# Patient Record
Sex: FEMALE | Employment: FULL TIME | URBAN - METROPOLITAN AREA
[De-identification: names, ages, dates, MRNs, and addresses within clinical notes are randomized per-mention and may not be internally consistent; named-entity substitution may affect disease eponyms.]

---

## 2017-03-20 ENCOUNTER — RECORDS - HEALTHEAST (OUTPATIENT)
Dept: LAB | Facility: CLINIC | Age: 42
End: 2017-03-20

## 2017-03-20 LAB
CHOLEST SERPL-MCNC: 221 MG/DL
FASTING STATUS PATIENT QL REPORTED: ABNORMAL
HDLC SERPL-MCNC: 63 MG/DL
LDLC SERPL CALC-MCNC: 136 MG/DL
TRIGL SERPL-MCNC: 109 MG/DL

## 2017-03-21 LAB — SYPHILIS RPR SCREEN - HISTORICAL: NORMAL

## 2017-03-22 ENCOUNTER — RECORDS - HEALTHEAST (OUTPATIENT)
Dept: LAB | Facility: CLINIC | Age: 42
End: 2017-03-22

## 2017-03-24 LAB
HLA-B27 RESULT - HISTORICAL: NEGATIVE
INTERPRETATION: NORMAL

## 2017-10-17 ENCOUNTER — RECORDS - HEALTHEAST (OUTPATIENT)
Dept: ADMINISTRATIVE | Facility: OTHER | Age: 42
End: 2017-10-17

## 2017-11-07 ENCOUNTER — RECORDS - HEALTHEAST (OUTPATIENT)
Dept: ADMINISTRATIVE | Facility: OTHER | Age: 42
End: 2017-11-07

## 2017-11-07 ENCOUNTER — RECORDS - HEALTHEAST (OUTPATIENT)
Dept: LAB | Facility: CLINIC | Age: 42
End: 2017-11-07

## 2017-11-09 LAB — ANA SER QL: 0.1 U

## 2018-02-10 ENCOUNTER — RECORDS - HEALTHEAST (OUTPATIENT)
Dept: ADMINISTRATIVE | Facility: OTHER | Age: 43
End: 2018-02-10

## 2018-04-13 ENCOUNTER — RECORDS - HEALTHEAST (OUTPATIENT)
Dept: ADMINISTRATIVE | Facility: OTHER | Age: 43
End: 2018-04-13

## 2018-04-16 ENCOUNTER — RECORDS - HEALTHEAST (OUTPATIENT)
Dept: ADMINISTRATIVE | Facility: OTHER | Age: 43
End: 2018-04-16

## 2018-04-19 ENCOUNTER — RECORDS - HEALTHEAST (OUTPATIENT)
Dept: LAB | Facility: CLINIC | Age: 43
End: 2018-04-19

## 2018-04-19 ENCOUNTER — RECORDS - HEALTHEAST (OUTPATIENT)
Dept: ADMINISTRATIVE | Facility: OTHER | Age: 43
End: 2018-04-19

## 2018-04-19 LAB
ANION GAP SERPL CALCULATED.3IONS-SCNC: 9 MMOL/L (ref 5–18)
BUN SERPL-MCNC: 14 MG/DL (ref 8–22)
CALCIUM SERPL-MCNC: 9.5 MG/DL (ref 8.5–10.5)
CHLORIDE BLD-SCNC: 105 MMOL/L (ref 98–107)
CO2 SERPL-SCNC: 24 MMOL/L (ref 22–31)
CREAT SERPL-MCNC: 0.74 MG/DL (ref 0.6–1.1)
CREAT UR-MCNC: 63 MG/DL
GFR SERPL CREATININE-BSD FRML MDRD: >60 ML/MIN/1.73M2
GLUCOSE BLD-MCNC: 99 MG/DL (ref 70–125)
MICROALBUMIN UR-MCNC: 1.38 MG/DL (ref 0–1.99)
MICROALBUMIN/CREAT UR: 21.9 MG/G
POTASSIUM BLD-SCNC: 4.3 MMOL/L (ref 3.5–5)
SODIUM SERPL-SCNC: 138 MMOL/L (ref 136–145)

## 2018-05-03 ENCOUNTER — RECORDS - HEALTHEAST (OUTPATIENT)
Dept: ADMINISTRATIVE | Facility: OTHER | Age: 43
End: 2018-05-03

## 2018-05-07 ENCOUNTER — RECORDS - HEALTHEAST (OUTPATIENT)
Dept: ADMINISTRATIVE | Facility: OTHER | Age: 43
End: 2018-05-07

## 2018-05-08 ENCOUNTER — RECORDS - HEALTHEAST (OUTPATIENT)
Dept: ADMINISTRATIVE | Facility: OTHER | Age: 43
End: 2018-05-08

## 2018-05-10 ENCOUNTER — HOSPITAL ENCOUNTER (OUTPATIENT)
Dept: MRI IMAGING | Facility: HOSPITAL | Age: 43
Discharge: HOME | End: 2018-05-10

## 2018-05-10 DIAGNOSIS — N28.0 RENAL INFARCT (H): ICD-10-CM

## 2018-05-17 ENCOUNTER — AMBULATORY - HEALTHEAST (OUTPATIENT)
Dept: LAB | Facility: HOSPITAL | Age: 43
End: 2018-05-17

## 2018-05-17 DIAGNOSIS — N28.0 THROMBOEMBOLISM OF RENAL ARTERIES (H): ICD-10-CM

## 2018-05-17 LAB
ALBUMIN UR-MCNC: NEGATIVE MG/DL
ANION GAP SERPL CALCULATED.3IONS-SCNC: 9 MMOL/L (ref 5–18)
APPEARANCE UR: CLEAR
BACTERIA #/AREA URNS HPF: ABNORMAL HPF
BILIRUB UR QL STRIP: NEGATIVE
BUN SERPL-MCNC: 15 MG/DL (ref 8–22)
CALCIUM SERPL-MCNC: 9.3 MG/DL (ref 8.5–10.5)
CHLORIDE BLD-SCNC: 102 MMOL/L (ref 98–107)
CO2 SERPL-SCNC: 27 MMOL/L (ref 22–31)
COLOR UR AUTO: COLORLESS
CREAT SERPL-MCNC: 0.69 MG/DL (ref 0.6–1.1)
GFR SERPL CREATININE-BSD FRML MDRD: >60 ML/MIN/1.73M2
GLUCOSE BLD-MCNC: 95 MG/DL (ref 70–125)
GLUCOSE UR STRIP-MCNC: NEGATIVE MG/DL
HGB UR QL STRIP: NEGATIVE
KETONES UR STRIP-MCNC: NEGATIVE MG/DL
LDH SERPL L TO P-CCNC: 184 U/L (ref 125–220)
LEUKOCYTE ESTERASE UR QL STRIP: ABNORMAL
NITRATE UR QL: NEGATIVE
PH UR STRIP: 7 [PH] (ref 4.5–8)
POTASSIUM BLD-SCNC: 3.8 MMOL/L (ref 3.5–5)
RBC #/AREA URNS AUTO: ABNORMAL HPF
SODIUM SERPL-SCNC: 138 MMOL/L (ref 136–145)
SP GR UR STRIP: 1 (ref 1–1.03)
SQUAMOUS #/AREA URNS AUTO: ABNORMAL LPF
UROBILINOGEN UR STRIP-ACNC: ABNORMAL
WBC #/AREA URNS AUTO: ABNORMAL HPF

## 2018-05-19 LAB — BACTERIA SPEC CULT: NO GROWTH

## 2018-05-23 ENCOUNTER — RECORDS - HEALTHEAST (OUTPATIENT)
Dept: ADMINISTRATIVE | Facility: OTHER | Age: 43
End: 2018-05-23

## 2018-06-20 ENCOUNTER — RECORDS - HEALTHEAST (OUTPATIENT)
Dept: LAB | Facility: CLINIC | Age: 43
End: 2018-06-20

## 2018-06-20 ENCOUNTER — RECORDS - HEALTHEAST (OUTPATIENT)
Dept: ADMINISTRATIVE | Facility: OTHER | Age: 43
End: 2018-06-20

## 2018-06-20 LAB
C REACTIVE PROTEIN LHE: 0.6 MG/DL (ref 0–0.8)
ERYTHROCYTE [SEDIMENTATION RATE] IN BLOOD BY WESTERGREN METHOD: 20 MM/HR (ref 0–20)

## 2018-06-22 ENCOUNTER — RECORDS - HEALTHEAST (OUTPATIENT)
Dept: ADMINISTRATIVE | Facility: OTHER | Age: 43
End: 2018-06-22

## 2018-07-23 ENCOUNTER — RECORDS - HEALTHEAST (OUTPATIENT)
Dept: ADMINISTRATIVE | Facility: OTHER | Age: 43
End: 2018-07-23

## 2018-07-24 ENCOUNTER — AMBULATORY - HEALTHEAST (OUTPATIENT)
Dept: VASCULAR SURGERY | Facility: CLINIC | Age: 43
End: 2018-07-24

## 2018-07-24 DIAGNOSIS — I83.93 VARICOSE VEINS OF LEGS: ICD-10-CM

## 2018-07-24 DIAGNOSIS — I80.9 THROMBOPHLEBITIS: ICD-10-CM

## 2018-08-16 ENCOUNTER — COMMUNICATION - HEALTHEAST (OUTPATIENT)
Dept: VASCULAR SURGERY | Facility: CLINIC | Age: 43
End: 2018-08-16

## 2018-08-16 ENCOUNTER — RECORDS - HEALTHEAST (OUTPATIENT)
Dept: ADMINISTRATIVE | Facility: OTHER | Age: 43
End: 2018-08-16

## 2018-08-20 ENCOUNTER — COMMUNICATION - HEALTHEAST (OUTPATIENT)
Dept: ADMINISTRATIVE | Facility: CLINIC | Age: 43
End: 2018-08-20

## 2018-08-24 ENCOUNTER — OFFICE VISIT - HEALTHEAST (OUTPATIENT)
Dept: RHEUMATOLOGY | Facility: CLINIC | Age: 43
End: 2018-08-24

## 2018-08-24 DIAGNOSIS — R93.429 ABNORMAL MRI, KIDNEY: ICD-10-CM

## 2018-08-24 DIAGNOSIS — I72.8 SPLENIC ARTERY ANEURYSM (H): ICD-10-CM

## 2018-08-24 DIAGNOSIS — R10.9 ABDOMINAL PAIN: ICD-10-CM

## 2018-08-29 ENCOUNTER — RECORDS - HEALTHEAST (OUTPATIENT)
Dept: ADMINISTRATIVE | Facility: OTHER | Age: 43
End: 2018-08-29

## 2018-08-29 ENCOUNTER — OFFICE VISIT - HEALTHEAST (OUTPATIENT)
Dept: VASCULAR SURGERY | Facility: CLINIC | Age: 43
End: 2018-08-29

## 2018-08-29 DIAGNOSIS — I83.893 SYMPTOMATIC VARICOSE VEINS OF BOTH LOWER EXTREMITIES: ICD-10-CM

## 2018-08-29 DIAGNOSIS — I87.2 VENOUS INSUFFICIENCY OF BOTH LOWER EXTREMITIES: ICD-10-CM

## 2018-08-29 DIAGNOSIS — I80.9 PHLEBITIS: ICD-10-CM

## 2018-09-11 ENCOUNTER — OFFICE VISIT - HEALTHEAST (OUTPATIENT)
Dept: VASCULAR SURGERY | Facility: CLINIC | Age: 43
End: 2018-09-11

## 2018-09-11 ENCOUNTER — RECORDS - HEALTHEAST (OUTPATIENT)
Dept: VASCULAR ULTRASOUND | Facility: CLINIC | Age: 43
End: 2018-09-11

## 2018-09-11 DIAGNOSIS — I83.893 SYMPTOMATIC VARICOSE VEINS OF BOTH LOWER EXTREMITIES: ICD-10-CM

## 2018-09-11 DIAGNOSIS — I87.2 VENOUS INSUFFICIENCY (CHRONIC) (PERIPHERAL): ICD-10-CM

## 2018-09-11 DIAGNOSIS — I83.893 VARICOSE VEINS OF BILATERAL LOWER EXTREMITIES WITH OTHER COMPLICATIONS: ICD-10-CM

## 2018-09-11 DIAGNOSIS — I80.9 PHLEBITIS AND THROMBOPHLEBITIS OF UNSPECIFIED SITE: ICD-10-CM

## 2018-12-05 ENCOUNTER — COMMUNICATION - HEALTHEAST (OUTPATIENT)
Dept: TELEHEALTH | Facility: CLINIC | Age: 43
End: 2018-12-05

## 2018-12-05 ENCOUNTER — OFFICE VISIT - HEALTHEAST (OUTPATIENT)
Dept: VASCULAR SURGERY | Facility: CLINIC | Age: 43
End: 2018-12-05

## 2018-12-05 DIAGNOSIS — I80.9 PHLEBITIS: ICD-10-CM

## 2018-12-05 DIAGNOSIS — I83.893 SYMPTOMATIC VARICOSE VEINS OF BOTH LOWER EXTREMITIES: ICD-10-CM

## 2018-12-05 ASSESSMENT — MIFFLIN-ST. JEOR: SCORE: 1304.94

## 2018-12-06 ENCOUNTER — COMMUNICATION - HEALTHEAST (OUTPATIENT)
Dept: VASCULAR SURGERY | Facility: CLINIC | Age: 43
End: 2018-12-06

## 2018-12-07 ENCOUNTER — AMBULATORY - HEALTHEAST (OUTPATIENT)
Dept: VASCULAR SURGERY | Facility: CLINIC | Age: 43
End: 2018-12-07

## 2018-12-18 ENCOUNTER — COMMUNICATION - HEALTHEAST (OUTPATIENT)
Dept: VASCULAR SURGERY | Facility: CLINIC | Age: 43
End: 2018-12-18

## 2018-12-31 ENCOUNTER — COMMUNICATION - HEALTHEAST (OUTPATIENT)
Dept: VASCULAR SURGERY | Facility: CLINIC | Age: 43
End: 2018-12-31

## 2019-01-08 ENCOUNTER — AMBULATORY - HEALTHEAST (OUTPATIENT)
Dept: VASCULAR SURGERY | Facility: CLINIC | Age: 44
End: 2019-01-08

## 2019-02-14 LAB
HPV SOURCE: NORMAL
HUMAN PAPILLOMA VIRUS 16 DNA: NEGATIVE
HUMAN PAPILLOMA VIRUS 18 DNA: NEGATIVE
HUMAN PAPILLOMA VIRUS FINAL DIAGNOSIS: NORMAL
HUMAN PAPILLOMA VIRUS OTHER HR: NEGATIVE
SPECIMEN DESCRIPTION: NORMAL

## 2019-02-22 ENCOUNTER — RECORDS - HEALTHEAST (OUTPATIENT)
Dept: ADMINISTRATIVE | Facility: OTHER | Age: 44
End: 2019-02-22

## 2019-02-22 LAB
BKR LAB AP ABNORMAL BLEEDING: YES
BKR LAB AP BIRTH CONTROL/HORMONES: NORMAL
BKR LAB AP CERVICAL APPEARANCE: NORMAL
BKR LAB AP GYN ADEQUACY: NORMAL
BKR LAB AP GYN INTERPRETATION: NORMAL
BKR LAB AP HPV REFLEX: NORMAL
BKR LAB AP LMP: NORMAL
BKR LAB AP PATIENT STATUS: NORMAL
BKR LAB AP PREVIOUS ABNORMAL: NORMAL
BKR LAB AP PREVIOUS NORMAL: 2015
HIGH RISK?: NO
PATH REPORT.COMMENTS IMP SPEC: NORMAL
RESULT FLAG (HE HISTORICAL CONVERSION): NORMAL

## 2019-03-03 ENCOUNTER — TRANSFERRED RECORDS (OUTPATIENT)
Dept: HEALTH INFORMATION MANAGEMENT | Facility: CLINIC | Age: 44
End: 2019-03-03

## 2019-03-05 ENCOUNTER — TRANSFERRED RECORDS (OUTPATIENT)
Dept: HEALTH INFORMATION MANAGEMENT | Facility: CLINIC | Age: 44
End: 2019-03-05

## 2019-03-18 ENCOUNTER — TRANSFERRED RECORDS (OUTPATIENT)
Dept: HEALTH INFORMATION MANAGEMENT | Facility: CLINIC | Age: 44
End: 2019-03-18

## 2019-03-25 ENCOUNTER — TRANSFERRED RECORDS (OUTPATIENT)
Dept: HEALTH INFORMATION MANAGEMENT | Facility: CLINIC | Age: 44
End: 2019-03-25

## 2019-04-09 ENCOUNTER — COMMUNICATION - HEALTHEAST (OUTPATIENT)
Dept: VASCULAR SURGERY | Facility: CLINIC | Age: 44
End: 2019-04-09

## 2019-04-15 ENCOUNTER — TRANSFERRED RECORDS (OUTPATIENT)
Dept: HEALTH INFORMATION MANAGEMENT | Facility: CLINIC | Age: 44
End: 2019-04-15

## 2019-04-23 ENCOUNTER — COMMUNICATION - HEALTHEAST (OUTPATIENT)
Dept: VASCULAR SURGERY | Facility: CLINIC | Age: 44
End: 2019-04-23

## 2019-05-03 ENCOUNTER — TRANSFERRED RECORDS (OUTPATIENT)
Dept: HEALTH INFORMATION MANAGEMENT | Facility: CLINIC | Age: 44
End: 2019-05-03

## 2019-05-20 ENCOUNTER — MEDICAL CORRESPONDENCE (OUTPATIENT)
Dept: HEALTH INFORMATION MANAGEMENT | Facility: CLINIC | Age: 44
End: 2019-05-20

## 2019-05-21 NOTE — TELEPHONE ENCOUNTER
FUTURE VISIT INFORMATION      FUTURE VISIT INFORMATION:    Date: 6/19/19    Time: 8AM    Location: [facility]  REFERRAL INFORMATION:    Referring provider:  SARAH Mendez    Referring providers clinic:  [facility]    Reason for visit/diagnosis:  Segmental colitis associated with diverticulosis    NOTES STATUS DETAILS   OFFICE NOTE from referring provider  Received    OFFICE NOTE from other specialist   Received/Care Everywhere [facility] 5/3/19, 4/11/19   DISCHARGE SUMMARY FROM HOSPITAL N/A    DISCHARGE REPORT FROM ED Care Everywhere 3/18/19, 8/24/18, 7/17/15   OPERATIVE REPORT  N/A    PFC REPORT N/A    MEDICATION LIST Received/Care Everywhere    LABS     FIT/STOOL TESTING Care Everywhere    PERTINENT LABS Care Everywhere    PATHOLOGY REPORTS RELATED TO DIAGNOSIS Received 4/15/19, 3/25/19   DIAGNOSTIC PROCEDURES     COLONOSCOPY Received 3/25/19   ENDOSCOPY (EGD) Received 4/15/19   ERCP N/A    EUS N/A    FLEX SIGMOIDOSCOPY N/A    IMAGING & REPORT      CT, MRI, US, XR Received/Care Everywhere CT Abd/Pelvis 3/5/19 - [facility]  CT Abd/Pelvis 7/17/15 - [facility]  MR Abd 5/10/18 - [facility]       Action Tracy MYRICK 6.18.19 2:06 PM   Action Taken Received cd from [facility] with 3.25.19 and 4.15.19 color images of colonoscopy and endoscopy. Gave CD to Jacey on 4N.

## 2019-06-12 ENCOUNTER — RECORDS - HEALTHEAST (OUTPATIENT)
Dept: ADMINISTRATIVE | Facility: OTHER | Age: 44
End: 2019-06-12

## 2019-06-13 ENCOUNTER — TELEPHONE (OUTPATIENT)
Dept: GASTROENTEROLOGY | Facility: CLINIC | Age: 44
End: 2019-06-13

## 2019-06-13 ENCOUNTER — HOSPITAL ENCOUNTER (OUTPATIENT)
Dept: RADIOLOGY | Facility: HOSPITAL | Age: 44
Discharge: HOME | End: 2019-06-13
Attending: INTERNAL MEDICINE

## 2019-06-13 DIAGNOSIS — R05.9 COUGH: ICD-10-CM

## 2019-06-19 ENCOUNTER — PRE VISIT (OUTPATIENT)
Dept: GASTROENTEROLOGY | Facility: CLINIC | Age: 44
End: 2019-06-19

## 2019-06-19 ENCOUNTER — OFFICE VISIT (OUTPATIENT)
Dept: GASTROENTEROLOGY | Facility: CLINIC | Age: 44
End: 2019-06-19
Payer: COMMERCIAL

## 2019-06-19 VITALS
WEIGHT: 137.6 LBS | TEMPERATURE: 98.2 F | HEART RATE: 83 BPM | HEIGHT: 66 IN | DIASTOLIC BLOOD PRESSURE: 58 MMHG | BODY MASS INDEX: 22.11 KG/M2 | SYSTOLIC BLOOD PRESSURE: 101 MMHG | OXYGEN SATURATION: 98 %

## 2019-06-19 DIAGNOSIS — K52.9 COLITIS: Primary | ICD-10-CM

## 2019-06-19 DIAGNOSIS — K52.9 COLITIS: ICD-10-CM

## 2019-06-19 LAB
CRP SERPL-MCNC: <2.9 MG/L (ref 0–8)
ERYTHROCYTE [SEDIMENTATION RATE] IN BLOOD BY WESTERGREN METHOD: 9 MM/H (ref 0–20)
TSH SERPL DL<=0.005 MIU/L-ACNC: 0.96 MU/L (ref 0.4–4)

## 2019-06-19 RX ORDER — MESALAMINE 4 G/60ML
SUSPENSION RECTAL
Refills: 3 | COMMUNITY
Start: 2019-06-12 | End: 2019-11-20

## 2019-06-19 RX ORDER — ALBUTEROL SULFATE 90 UG/1
AEROSOL, METERED RESPIRATORY (INHALATION)
COMMUNITY
Start: 2019-05-10 | End: 2024-01-17

## 2019-06-19 RX ORDER — BUTALBITAL, ACETAMINOPHEN AND CAFFEINE 50; 325; 40 MG/1; MG/1; MG/1
1 TABLET ORAL EVERY 6 HOURS PRN
COMMUNITY
Start: 2018-09-24

## 2019-06-19 RX ORDER — MESALAMINE 1.2 G/1
TABLET, DELAYED RELEASE ORAL
Refills: 0 | COMMUNITY
Start: 2019-05-03 | End: 2019-08-21

## 2019-06-19 ASSESSMENT — ENCOUNTER SYMPTOMS
TASTE DISTURBANCE: 0
WEIGHT LOSS: 1
FEVER: 0
SNORES LOUDLY: 0
WHEEZING: 1
SMELL DISTURBANCE: 0
DYSPNEA ON EXERTION: 0
JAUNDICE: 0
CONSTIPATION: 0
SORE THROAT: 1
NECK MASS: 0
BRUISES/BLEEDS EASILY: 0
ABDOMINAL PAIN: 1
HOARSE VOICE: 0
TROUBLE SWALLOWING: 0
HEMOPTYSIS: 0
POLYDIPSIA: 0
BLOOD IN STOOL: 1
INCREASED ENERGY: 1
DECREASED APPETITE: 1
NAUSEA: 1
SINUS PAIN: 0
SPUTUM PRODUCTION: 1
VOMITING: 0
HALLUCINATIONS: 0
POSTURAL DYSPNEA: 0
BOWEL INCONTINENCE: 0
BLOATING: 0
FATIGUE: 1
SHORTNESS OF BREATH: 0
DIARRHEA: 1
RECTAL PAIN: 0
ALTERED TEMPERATURE REGULATION: 0
SWOLLEN GLANDS: 1
SINUS CONGESTION: 0
HEARTBURN: 1
COUGH DISTURBING SLEEP: 0
COUGH: 1

## 2019-06-19 ASSESSMENT — PAIN SCALES - GENERAL: PAINLEVEL: MILD PAIN (2)

## 2019-06-19 ASSESSMENT — MIFFLIN-ST. JEOR: SCORE: 1293.15

## 2019-06-19 NOTE — PATIENT INSTRUCTIONS
Recommendations: Suspect your segmental colitis associated with diverticulosis (SCAD) is under control though will evaluate with inflammatory marker testing today.  Suspect ongoing bleeding is related to hemorrhoids.  Recommend treating this with reintroduction of fiber to your diet. (25 G daily).  Will refer to nutrition to further guide in nutrition recommendations.      Plan:  -Continue Lialda 4.8 g daily, could stop Rowasa enema's if inflammatory markers are normal  -If inflammatory markers elevated may consider ciprofloxacin course and repeat endoscopic evaluation.  -Nutrition referral for fiber reintroduction which should assist in hemorrhoid treatment  -Labs today  -Stool tests today (Fecal calprotectin- call insurance to insure they will cover)  -Follow up with PA/NP or Dr. Molina in 3 months     For questions regarding your care Monday through Friday, contact the RN GI care coordinator, Elizabeth Dickerson at [phone] and your call will be returned within 24 hours. If you have a more immediate medical need call   [phone] . Your call will be returned same day, or if consultation is needed with the provider, it may be following business day. You may also send  a My Chart message to your provider and it will be answered in a timely fashion. If in need of assistance after hours, holidays or weekends please call [phone] and have the on call GI fellow paged.        For medication refills (prescribed by the GI clinic), contact your pharmacy.  In order for your refill to be processed in a timely fashion, it is your responsibility to ensure you follow the recommendations from your provider regarding your laboratory studies and follow up appointments     For appointment rescheduling/cancellation, contact [phone]      After hours, holidays, or if you have an immediate GI concern and cannot wait for a return call, contact the GI Fellow at [phone] and select option #4.

## 2019-06-19 NOTE — PROGRESS NOTES
IBD CLINIC VISIT     CC/REFERRING MD:  Stacia Yang  REASON FOR CONSULTATION: SCAD evaluation and management    SCAD HISTORY  Age at diagnosis: 44  Extent of disease: SCAD, left sided rui-diverticular inflammation  Current SCAD medications: Lialda 1.2 G daily, Rowasa enema 4 G    Prior SCAD Medications: Cipro/flagyl (helped)    DISEASE ASSESSMENT  Labs:  No lab results found.    Invalid input(s):  ALB,  HGB  Endoscopic assessment: EGD 4/15/19 Normal, normal biopsies of esophagus, stomach and duodenum  3/25/19 colonoscopy for rectal bleeding: left sided diverticular with edema, erythema, loss of vascular pattern noted in 2 segments (40-36 cm and 30-22 cm from the anus.    CT ab/pelvis 3/5/19:  Renal hypodensities stable from 4/16/19 exam, 2 mm calculus in right kidney, no gastric or small bowel abnormality  Fecal calprotectin: pending  C diff: negative per OSH records      ASSESSMENT/PLAN  Mrs. Salter is a 44 year old woman with history of cholecystectomy, 3 uncomplicated vaginal deliveries, thyroid cysts (per patient), and recent diagnosis of segmental colitis associated with diverticular disease who is establishing care for management of her disease.    Patient with clinical and endoscopic features consistent with SCAD.  Suspect disease is actually controlled on current 4.8 g of Lialda and rowasa enema's. Will evaluate for signs of persistent inflammation and if present recommend a course of ciprofloxacin and consider flexible sigmoidoscopy.  Given report of peripheral eosinophila (normal biopsies on EGD suggest against eosinophilic gastroenteritis) will complete O and P and Giardia testing (enteric panel completed at OSH). Suspect tests to be negative given clinically controlled at this time on current treatment.  Favor reported blood in stool is actually hemorrhoid related.  Recommend reintroducing fiber to diet.      Regarding dysphagia, solid food dysphagia reported that resolves with .  Chronic  in nature and recent EGD with negative biopsies and no signs of strictures.  Given stability, lack of alarm symptoms and recent anatomic evaluation would monitor for now.  Lack of liquid dysphagia suggests against motility dysfunction but doesn't ruled out.  If continued symptoms, could consider motility evaluation in future if persistent or progression of symptoms.      Plan:  -Continue Lialda 4.8 g daily, could stop Rowasa enema's if inflammatory markers are normal  -If inflammatory markers elevated may consider ciprofloxacin course and repeat endoscopic evaluation.  -Nutrition referral for fiber reintroduction which should assist in hemorrhoid treatment  -Labs today  -Stool tests today (Fecal calprotectin- call insurance to insure they will cover)  -Follow up with PA/NP or Dr. Molina in 3 months     Return to clinic in 3 months    Thank you for this consultation.  It was a pleasure to participate in the care of this patient; please contact us with any further questions.  I spent a total of 60 minutes, face to face, was spent with this patient, >50% of which was counseling regarding the above delineated issues.      Abran Valencia MD  Gastroenterology Fellow  Division of Gastroenterology, Hepatology and Nutrition  [facility]          HPI:   Mrs. Salter is a 44 year old woman with history of cholecystectomy, 3 uncomplicated vaginal deliveries, thyroid cysts (per patient), and recent diagnosis of segmental colitis associated with diverticular disease who is establishing care for management of her disease.      Per chart:  Patient [facility] about [facility] at [facility] for diverticulitis and last seen 5/2019 for follow up of SCAD and nausea.  Patient had subtle symptoms of Patient had colonoscopy 3/2019 with finding of edema, erythema, loss of and loss of vascular patter in 2 segments of left colon with mild to moderate diverticulosis.  Sigmoid biopsies showed mildly active chronic colitis consistent with SCAD.  Patient  "was treated with 10 day course of cipro/flagyl with improvement in symptoms followed by Lialda 4.8 G daily with possible improvement. On 4/11/19 the patient was found to be c.diff negative.      At time of consultation, patient reported frustation with lack of symptom improvement and communication at prior GI providers office.  Here to establish care.  Patient reports 5 years of alternating diarrhea and constipation along with occassional undigested food in stool and borrygymi.  She reports had initial improvement in symptoms following cipro/flagyl and mesalamine but called in as still was having blood streaked stools for which she was started on rowasa enemas.  She is concerned she is still flaring despite the therapies and putting herself on a low residue \"simple carbohydrate diet\" supplemented with \"high protein predigested cancer shakes\".  She reports weight loss in the setting of biking 12 miles daily and changed diet.      At time of consultation the patient reports two semiformed stools daily with blood streaks in all stools (10% blood) and with wiping.  She notes one day a week she will have bloating, increased flatus, general malaise, 5-6 watery BM's along with no associated abdominal pain.      Patient also reports 10 years of chronic food getting stuck in her esophagus until drinks some water with the food.  It doesn't happen with draper, but rather typically with meats.  She has never had a food impaction.  Happens with every meal.  No odynophagia, no nausea currently, no vomiting, rare reflux/heartburn. PPI not helpful in the past.  Reports was having nausea but improved with simple carbohydrate diet.  Reports when was nauseated, it was typically in the morning and improved with a protein breakfast sandwich.    Prior report of dysphagia and nausea,  Had upper endoscopy April 15th 2019, 1 gastric polyp (fundic gland).  Separate mid/distal esophageal, gastric and duodenal biopsies negative.      Patient " notes Hx of thyroid cysts and borderline TSH levels.       Social hx   -no smoking  -1-2 x month nsaids  -rare alcohol   -no illicit  -Bike 12 miles a day, weights        Fam Hx  Mother with history colitis - no issues in years, colon polyps  Maternal grandfather  of colon cancer at age 76  Both parents have diverticulitis      ROS:    No fevers or chills  No weight loss  No blurry vision, double vision or change in vision  No sore throat  No lymphadenopathy  No headache, paraesthesias, or weakness in a limb  No shortness of breath or wheezing  No chest pain or pressure  No arthralgias or myalgias  No rashes or skin changes  Yes dysphagia  Yes BRBPR, hematochezia  No dysuria, frequency or urgency  No hot/cold intolerance or polyria  No anxiety or depression    Extra intestinal manifestations of IBD:  No uveitis/episcleritis  No aphthous ulcers   No arthritis   No erythema nodosum/pyoderma gangrenosum.     PERTINENT PAST MEDICAL HISTORY:  No past medical history on file.    See above    PREVIOUS SURGERIES:  No past surgical history on file.  Surgical hx  -Cholecystectomy   -3 children - vaginal x 3, Bladder sling placed 2016    PREVIOUS ENDOSCOPY:  See above    ALLERGIES:     Allergies   Allergen Reactions     Sulfa Drugs Hives     Other reaction(s): *Unknown       PERTINENT MEDICATIONS:    Current Outpatient Medications:      albuterol (PROAIR HFA/PROVENTIL HFA/VENTOLIN HFA) 108 (90 Base) MCG/ACT inhaler, , Disp: , Rfl:      amitriptyline (ELAVIL) 10 MG tablet, Take 10 mg by mouth, Disp: , Rfl:      butalbital-acetaminophen-caffeine (FIORICET/ESGIC) -40 MG tablet, Take 1 tablet by mouth, Disp: , Rfl:      mesalamine (LIALDA) 1.2 g EC tablet, TK 4 TS PO QD WITH A MEAL, Disp: , Rfl: 0     mesalamine (ROWASA) 4 g enema, INSERT 1 ENEMA RECTALLY QHS, Disp: , Rfl: 3    SOCIAL HISTORY:  Social History     Socioeconomic History     Marital status:      Spouse name: Not on file     Number of children:  "Not on file     Years of education: Not on file     Highest education level: Not on file   Occupational History     Not on file   Social Needs     Financial resource strain: Not on file     Food insecurity:     Worry: Not on file     Inability: Not on file     Transportation needs:     Medical: Not on file     Non-medical: Not on file   Tobacco Use     Smoking status: Never Smoker     Smokeless tobacco: Never Used   Substance and Sexual Activity     Alcohol use: Not on file     Drug use: Not on file     Sexual activity: Not on file   Lifestyle     Physical activity:     Days per week: Not on file     Minutes per session: Not on file     Stress: Not on file   Relationships     Social connections:     Talks on phone: Not on file     Gets together: Not on file     Attends Sikh service: Not on file     Active member of club or organization: Not on file     Attends meetings of clubs or organizations: Not on file     Relationship status: Not on file     Intimate partner violence:     Fear of current or ex partner: Not on file     Emotionally abused: Not on file     Physically abused: Not on file     Forced sexual activity: Not on file   Other Topics Concern     Not on file   Social History Narrative     Not on file       FAMILY HISTORY:  No family history on file.    Past/family/social history reviewed and no changes    PHYSICAL EXAMINATION:  Constitutional: aaox3, cooperative, pleasant, not dyspneic/diaphoretic, no acute distress  Vitals reviewed: /58   Pulse 83   Temp 98.2  F (36.8  C) (Oral)   Ht 1.68 m (5' 6.14\")   Wt 62.4 kg (137 lb 9.6 oz)   SpO2 98%   BMI 22.11 kg/m    Wt:   Wt Readings from Last 2 Encounters:   06/19/19 62.4 kg (137 lb 9.6 oz)      Eyes: Sclera anicteric/injected  Ears/nose/mouth/throat: Normal oropharynx without ulcers or exudate, mucus membranes moist, hearing intact  Neck: supple, thyroid normal size  CV: No edema  Respiratory: Unlabored breathing  Lymph: No axillary, " submandibular, supraclavicular or inguinal lymphadenopathy  Abd: Soft, Nondistended, +bs, no hepatosplenomegaly, nontender, no peritoneal signs  Skin: warm, perfused, no jaundice  Psych: Normal affect  MSK: Normal gait      PERTINENT STUDIES:  Most recent CBC:  No lab results found.  Most recent hepatic panel:  No lab results found.    Invalid input(s): EDUARDO, ALP  Most recent creatinine:  No lab results found.  Answers for HPI/ROS submitted by the patient on 6/19/2019   General Symptoms: Yes  Skin Symptoms: No  HENT Symptoms: Yes  EYE SYMPTOMS: No  HEART SYMPTOMS: No  LUNG SYMPTOMS: Yes  INTESTINAL SYMPTOMS: Yes  URINARY SYMPTOMS: No  GYNECOLOGIC SYMPTOMS: No  BREAST SYMPTOMS: No  SKELETAL SYMPTOMS: No  BLOOD SYMPTOMS: Yes  NERVOUS SYSTEM SYMPTOMS: No  MENTAL HEALTH SYMPTOMS: No  Fever: No  Loss of appetite: Yes  Weight loss: Yes  Fatigue: Yes  Increased stress: Yes  Excessive thirst: No  Feeling hot or cold when others believe the temperature is normal: No  Loss of height: No  Post-operative complications: No  Surgical site pain: No  Hallucinations: No  Change in or Loss of Energy: Yes  Hyperactivity: No  Confusion: No  Ear pain: Yes  Ear discharge: No  Hearing loss: No  Tinnitus: No  Nosebleeds: No  Congestion: No  Sinus pain: No  Trouble swallowing: No   Voice hoarseness: No  Mouth sores: No  Sore throat: Yes  Tooth pain: No  Gum tenderness: No  Bleeding gums: No  Change in taste: No  Change in sense of smell: No  Dry mouth: No  Hearing aid used: No  Neck lump: No  Cough: Yes  Sputum or phlegm: Yes  Coughing up blood: No  Difficulty breating or shortness of breath: No  Snoring: No  Wheezing: Yes  Difficulty breathing on exertion: No  Nighttime Cough: No  Difficulty breathing when lying flat: No  Heart burn or indigestion: Yes  Nausea: Yes  Vomiting: No  Abdominal pain: Yes  Bloating: No  Constipation: No  Diarrhea: Yes  Blood in stool: Yes  Black stools: No  Rectal or Anal pain: No  Fecal incontinence:  No  Yellowing of skin or eyes: No  Vomit with blood: No  Anemia: No  Swollen glands: Yes  Easy bleeding or bruising: No  Edema or swelling: No

## 2019-06-19 NOTE — LETTER
6/19/2019       RE: Velma Mcfarlane  [address] [facility] [address]     Dear Colleague,    Thank you for referring your patient, Velma Mcfarlane, to the [facility] GASTROENTEROLOGY AND IBD CLINIC at [facility]. Please see a copy of my visit note below.    IBD CLINIC VISIT     CC/REFERRING MD:  Stacia Yang  REASON FOR CONSULTATION: SCAD evaluation and management    SCAD HISTORY  Age at diagnosis: 44  Extent of disease: SCAD, left sided rui-diverticular inflammation  Current SCAD medications: Lialda 1.2 G daily, Rowasa enema 4 G    Prior SCAD Medications: Cipro/flagyl (helped)    DISEASE ASSESSMENT  Labs:  No lab results found.    Invalid input(s):  ALB,  HGB  Endoscopic assessment: EGD 4/15/19 Normal, normal biopsies of esophagus, stomach and duodenum  3/25/19 colonoscopy for rectal bleeding: left sided diverticular with edema, erythema, loss of vascular pattern noted in 2 segments (40-36 cm and 30-22 cm from the anus.    CT ab/pelvis 3/5/19:  Renal hypodensities stable from 4/16/19 exam, 2 mm calculus in right kidney, no gastric or small bowel abnormality  Fecal calprotectin: pending  C diff: negative per OSH records    ASSESSMENT/PLAN  Mrs. Salter is a 44 year old woman with history of cholecystectomy, 3 uncomplicated vaginal deliveries, thyroid cysts (per patient), and recent diagnosis of segmental colitis associated with diverticular disease who is establishing care for management of her disease.    Patient with clinical and endoscopic features consistent with SCAD.  Suspect disease is actually controlled on current 4.8 g of Lialda and rowasa enema's. Will evaluate for signs of persistent inflammation and if present recommend a course of ciprofloxacin and consider flexible sigmoidoscopy.  Given report of peripheral eosinophila (normal biopsies on EGD suggest against eosinophilic gastroenteritis) will complete O and P and Giardia testing  (enteric panel completed at OSH). Suspect tests to be negative given clinically controlled at this time on current treatment.  Favor reported blood in stool is actually hemorrhoid related.  Recommend reintroducing fiber to diet.      Regarding dysphagia, solid food dysphagia reported that resolves with .  Chronic in nature and recent EGD with negative biopsies and no signs of strictures.  Given stability, lack of alarm symptoms and recent anatomic evaluation would monitor for now.  Lack of liquid dysphagia suggests against motility dysfunction but doesn't ruled out.  If continued symptoms, could consider motility evaluation in future if persistent or progression of symptoms.      Plan:  -Continue Lialda 4.8 g daily, could stop Rowasa enema's if inflammatory markers are normal  -If inflammatory markers elevated may consider ciprofloxacin course and repeat endoscopic evaluation.  -Nutrition referral for fiber reintroduction which should assist in hemorrhoid treatment  -Labs today  -Stool tests today (Fecal calprotectin- call insurance to insure they will cover)  -Follow up with PA/NP or Dr. Molina in 3 months     Return to clinic in 3 months    Thank you for this consultation.  It was a pleasure to participate in the care of this patient; please contact us with any further questions.  I spent a total of 60 minutes, face to face, was spent with this patient, >50% of which was counseling regarding the above delineated issues.    Abran Valencia MD  Gastroenterology Fellow  Division of Gastroenterology, Hepatology and Nutrition  [facility]    HPI:   Mrs. Salter is a 44 year old woman with history of cholecystectomy, 3 uncomplicated vaginal deliveries, thyroid cysts (per patient), and recent diagnosis of segmental colitis associated with diverticular disease who is establishing care for management of her disease.      Per chart:  Patient seen about 2014 at [facility] for diverticulitis and last seen 5/2019  "for follow up of SCAD and nausea.  Patient had subtle symptoms of Patient had colonoscopy 3/2019 with finding of edema, erythema, loss of and loss of vascular patter in 2 segments of left colon with mild to moderate diverticulosis.  Sigmoid biopsies showed mildly active chronic colitis consistent with SCAD.  Patient was treated with 10 day course of cipro/flagyl with improvement in symptoms followed by Lialda 4.8 G daily with possible improvement. On 4/11/19 the patient was found to be c.diff negative.      At time of consultation, patient reported frustation with lack of symptom improvement and communication at prior GI providers office.  Here to establish care.  Patient reports 5 years of alternating diarrhea and constipation along with occassional undigested food in stool and borrygymi.  She reports had initial improvement in symptoms following cipro/flagyl and mesalamine but called in as still was having blood streaked stools for which she was started on rowasa enemas.  She is concerned she is still flaring despite the therapies and putting herself on a low residue \"simple carbohydrate diet\" supplemented with \"high protein predigested cancer shakes\".  She reports weight loss in the setting of biking 12 miles daily and changed diet.      At time of consultation the patient reports two semiformed stools daily with blood streaks in all stools (10% blood) and with wiping.  She notes one day a week she will have bloating, increased flatus, general malaise, 5-6 watery BM's along with no associated abdominal pain.      Patient also reports 10 years of chronic food getting stuck in her esophagus until drinks some water with the food.  It doesn't happen with draper, but rather typically with meats.  She has never had a food impaction.  Happens with every meal.  No odynophagia, no nausea currently, no vomiting, rare reflux/heartburn. PPI not helpful in the past.  Reports was having nausea but improved with simple " carbohydrate diet.  Reports when was nauseated, it was typically in the morning and improved with a protein breakfast sandwich.    Prior report of dysphagia and nausea,  Had upper endoscopy 2019, 1 gastric polyp (fundic gland).  Separate mid/distal esophageal, gastric and duodenal biopsies negative.    Patient notes Hx of thyroid cysts and borderline TSH levels.       Social hx   -no smoking  -1-2 x month nsaids  -rare alcohol   -no illicit  -Bike 12 miles a day, weights    Fam Hx  Mother with history colitis - no issues in years, colon polyps  Maternal grandfather  of colon cancer at age 76  Both parents have diverticulitis    ROS:    No fevers or chills  No weight loss  No blurry vision, double vision or change in vision  No sore throat  No lymphadenopathy  No headache, paraesthesias, or weakness in a limb  No shortness of breath or wheezing  No chest pain or pressure  No arthralgias or myalgias  No rashes or skin changes  Yes dysphagia  Yes BRBPR, hematochezia  No dysuria, frequency or urgency  No hot/cold intolerance or polyria  No anxiety or depression    Extra intestinal manifestations of IBD:  No uveitis/episcleritis  No aphthous ulcers   No arthritis   No erythema nodosum/pyoderma gangrenosum.     PERTINENT PAST MEDICAL HISTORY:  No past medical history on file.    See above    PREVIOUS SURGERIES:  No past surgical history on file.  Surgical hx  -Cholecystectomy   -3 children - vaginal x 3, Bladder sling placed 2016    PREVIOUS ENDOSCOPY:  See above    ALLERGIES:     Allergies   Allergen Reactions     Sulfa Drugs Hives     Other reaction(s): *Unknown       PERTINENT MEDICATIONS:    Current Outpatient Medications:      albuterol (PROAIR HFA/PROVENTIL HFA/VENTOLIN HFA) 108 (90 Base) MCG/ACT inhaler, , Disp: , Rfl:      amitriptyline (ELAVIL) 10 MG tablet, Take 10 mg by mouth, Disp: , Rfl:      butalbital-acetaminophen-caffeine (FIORICET/ESGIC) -40 MG tablet, Take 1 tablet by mouth,  "Disp: , Rfl:      mesalamine (LIALDA) 1.2 g EC tablet, TK 4 TS PO QD WITH A MEAL, Disp: , Rfl: 0     mesalamine (ROWASA) 4 g enema, INSERT 1 ENEMA RECTALLY QHS, Disp: , Rfl: 3    SOCIAL HISTORY:  Social History     Socioeconomic History     Marital status:      Spouse name: Not on file     Number of children: Not on file     Years of education: Not on file     Highest education level: Not on file   Occupational History     Not on file   Social Needs     Financial resource strain: Not on file     Food insecurity:     Worry: Not on file     Inability: Not on file     Transportation needs:     Medical: Not on file     Non-medical: Not on file   Tobacco Use     Smoking status: Never Smoker     Smokeless tobacco: Never Used   Substance and Sexual Activity     Alcohol use: Not on file     Drug use: Not on file     Sexual activity: Not on file   Lifestyle     Physical activity:     Days per week: Not on file     Minutes per session: Not on file     Stress: Not on file   Relationships     Social connections:     Talks on phone: Not on file     Gets together: Not on file     Attends Oriental orthodox service: Not on file     Active member of club or organization: Not on file     Attends meetings of clubs or organizations: Not on file     Relationship status: Not on file     Intimate partner violence:     Fear of current or ex partner: Not on file     Emotionally abused: Not on file     Physically abused: Not on file     Forced sexual activity: Not on file   Other Topics Concern     Not on file   Social History Narrative     Not on file       FAMILY HISTORY:  No family history on file.    Past/family/social history reviewed and no changes    PHYSICAL EXAMINATION:  Constitutional: aaox3, cooperative, pleasant, not dyspneic/diaphoretic, no acute distress  Vitals reviewed: /58   Pulse 83   Temp 98.2  F (36.8  C) (Oral)   Ht 1.68 m (5' 6.14\")   Wt 62.4 kg (137 lb 9.6 oz)   SpO2 98%   BMI 22.11 kg/m     Wt:   Wt " Readings from Last 2 Encounters:   06/19/19 62.4 kg (137 lb 9.6 oz)      Eyes: Sclera anicteric/injected  Ears/nose/mouth/throat: Normal oropharynx without ulcers or exudate, mucus membranes moist, hearing intact  Neck: supple, thyroid normal size  CV: No edema  Respiratory: Unlabored breathing  Lymph: No axillary, submandibular, supraclavicular or inguinal lymphadenopathy  Abd: Soft, Nondistended, +bs, no hepatosplenomegaly, nontender, no peritoneal signs  Skin: warm, perfused, no jaundice  Psych: Normal affect  MSK: Normal gait    PERTINENT STUDIES:  Most recent CBC:  No lab results found.  Most recent hepatic panel:  No lab results found.    Invalid input(s): EDUARDO, ALP  Most recent creatinine:  No lab results found.    I performed a history and physical examination of the above patient and discussed the management with Dr. Valencia on 6/19/2019. I reviewed the note and there are no changes to the past medical, family or social history.  A complete 10 point review of systems was obtained. Please see the HPI for pertinent positives and negatives. All other systems were reviewed and were found to be negative.     I agree with the documented findings and plan of care as outlined.    Yolanda Molina MD  GI Attending  Pager: [phone]

## 2019-06-19 NOTE — NURSING NOTE
"  Chief Complaint   Patient presents with     Consult     New consult MJ segmental colitis associated with diverticulosis      Vitals:    06/19/19 0753   BP: 101/58   Pulse: 83   Temp: 98.2  F (36.8  C)   TempSrc: Oral   SpO2: 98%   Weight: 62.4 kg (137 lb 9.6 oz)   Height: 1.68 m (5' 6.14\")     Body mass index is 22.11 kg/m .  Quirino Bermudez CMA    "

## 2019-06-24 ENCOUNTER — OFFICE VISIT (OUTPATIENT)
Dept: GASTROENTEROLOGY | Facility: CLINIC | Age: 44
End: 2019-06-24
Attending: INTERNAL MEDICINE
Payer: COMMERCIAL

## 2019-06-24 DIAGNOSIS — Z71.3 NUTRITIONAL COUNSELING: ICD-10-CM

## 2019-06-24 DIAGNOSIS — K50.10 SEGMENTAL COLITIS ASSOCIATED WITH DIVERTICULOSIS (H): Primary | ICD-10-CM

## 2019-06-24 DIAGNOSIS — K57.30 SEGMENTAL COLITIS ASSOCIATED WITH DIVERTICULOSIS (H): Primary | ICD-10-CM

## 2019-06-24 NOTE — LETTER
"6/24/2019       RE: Velma Mcfarlane  [address] [facility] [address]     Dear Colleague,    Thank you for referring your patient, Velma Mcfarlane, to the [facility] GASTROENTEROLOGY AND IBD CLINIC at [facility]. Please see a copy of my visit note below.    [facility] Outpatient Medical Nutrition Therapy      Time Spent:  60 minutes  Session Type:  Initial Individual Session  Referring Physician:  Dr. Yolanda Molina  Reason for RD Visit:   Nutritional counseling, colitis and education for reintroducing fiber     Nutrition Assessment:  Patient is here for initial visit with Registered Dietitian (RD).  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    Patient stated that she feels like she has slower digestion and c/o bloating and stomach ache within an hour after eating breakfast and then a couple of hours after eating dinner meal will have looser stool. Denies any issues/pain/bloating with/after eating lunch. She stated that she previously (prior to this current flare) had issues with both constipation and loose stool, but now with recent flare she reported  having some bloody stools.  Overall, mornings tend to be the toughest for her especially after breakfast with experiencing symptoms. She denies any weight loss but stated that she has to stay on top of eating enough in order to not lose weight. She reported her usual body weight of 135 lbs. Per MD note on 6/19/19, pt reported weight loss at that visit due to low fiber diet and biking 12 miles. She eats 3 meals per day and a couple of snacks per day. One of her snacks is a \"pre-digested\" high calorie shake that contains 380 calories and that she tolerates. She drinks 64 oz or more " "water per day, 12 oz coffee with almond milk, occasional green tea and sometimes will drink a Gladys sugar-free drink.     She stated that she eats a healthy diet typically consisting of meat, a small amount of fruit, dairy products, vegetables (cooked currently are better tolerated as well as a more crunchy lettuce such as victor manuel) and drinks water, 12 oz coffee with almond milk and a little bit of grape juice since she tolerates this juice. She eats little to no processed foods and refined foods since they \"bother\" her stomach. She stated that in the past she followed a specific carbohydrate diet, and felt that it may have been a little bit helpful but she did not notice a large reduction in symptoms. She will take an over-the-counter enzyme called Digest gold which has 4000 units of lipase in one capsule when she will go out to eat and eat a heavier meal/ meal with red meat such as steak. She does feel like the enzyme is helpful for her to tolerate that heavier meal. Pt referred by MD to discussed diet recommendations and discussed education for reintroducing fiber into diet.     Height:   Ht Readings from Last 1 Encounters:   06/19/19 1.68 m (5' 6.14\")     Weight:  Reported her usual body weight is about 135 lbs.  Wt Readings from Last 10 Encounters:   06/19/19 62.4 kg (137 lb 9.6 oz)     BMI: 22.11    Diet Recall:  (usual recent meals):  Meal Food    Breakfast 6:30am: Scambled egg/egg with cheese/HB egg and banana or apple   Lunch Varies (12:30-1pm): Chicken/fish/pork chop/salmon/venision with 1/2 avocado and green victor manuel salad   Dinner 7pm: Same as lunch more green vegetables (edgar/brussels sprouts/gr beans   Snacks 10 Am: 380 calories Pre-digested shake. PM: apple or unsweetened applesauce and ~2x/week has a serving of ice cream or occasinally tortilla chips and salsa    Beverages 12 oz  coffee with almond milk, 64 oz or more water, occas green tea. Every other day has a bottle of Gladys sugar free/Gladys SF " Blueberry water drink   Alcohol Intake Infrequently (1-2 drinks per month).       Labs:  Reviewed  Pertinent Medications/vitamin and mineral supplements:    Pt stated that she takes Digest Gold enzymes which as 4000 units lipase (takes with a meal such as steak/heavier meal). Also reported taking mesalamine.  Food Allergies: NKFA  Food intolerances: If has more than one cup of coffee, spicy foods, potatoes, corn. Alcohol gives her headaches.  Physical Activity:  Exercises 4-5 times per week: Elliptical (30-45 mins) or biking for 10-12 miles (45-50 minutes).and weight training. Active moving at work all day  Estimated Nutrition Needs based on current body weight of 62k-2170 calories (30-35 kcals/kg), 62g-74g protein (1-1.2g/kg), ~1 ml/kcal or total fluids per MD.     MALNUTRITION:  % Weight Loss:  None noted  % Intake:  Decreased intake does not meet malnutrition criteria  Subcutaneous Fat Loss:  None observed  Muscle Loss:  None observed  Fluid Retention:  None noted    Malnutrition Diagnosis: Patient does not meet two of the above criteria necessary for diagnosing malnutrition In Context of:  Chronic illness or disease.    Nutrition Diagnosis:    Food and nutrition related knowledge deficit related to lack of previous diet education for colitis, fiber reintroduction as evidenced by pt report and interest in diet education with questions.    Nutrition Prescription: General healthful diet as tolerated. Slowly incorporate fiber into diet as tolerated.    Nutrition Intervention:    Nutrition Education/Counseling:  Provided diet education to assist in patient tolerating general healthful diet while managing symptoms. Explained that if lower fiber diet currently well tolerated, then she can continue to peel and cook vegetables well, softer fruit such as banana, ripe melon, applesauce and can try peeled apple if better tolerated and slowly reintroduce fiber containing foods as tolerated. Explained slowly  reintroducing foods back into diet as tolerated and not to get large loads of fiber all of a sudden or all at one meal at this time since she has been following a lower fiber diet currently. Reviewed both insoluble and soluble fiber roles in body, sources of both and told patient that she can slowly add in a fiber sources at meal as tolerated. Told pt she can start with more soluble fiber if continuing to have loose stool and add in one serving/small serving of a fiber-containing/moderate to higher fiber food at each meal initially as tolerated. If tolerated, can continue to increase/include more fiber (any type) containing foods/servings as tolerated and as desired. Explained that with diverticular disease and when not having a flare, recommendation is to eat adequate fiber daily. Discussed general fiber recommendation amounts of getting a total of 25-30 grams per day. Additionally explained to patient that the Gladys sugar free drinks due to they contain sugar alcohols which may contribute to bloating and loose stools. Based on pt interest/preference, briefly gave overview/reviewed the AID diet which is based loosely on the specific carbohydrate diet with food list for the various phases I, II and III depending on symptoms, but told patient since she is tolerating a variety of foods then do not recommend decreasing or limiting foods and recommended increasing food options as tolerated instead.  Answered pt's questions. She expressed understanding of education provided. See goals below.    Educational Materials Provided:  [place] soluble fiber handout (also explained and show sources of insoluble fiber) and AID diet and food list.    Goals:  1. Keep daily food and beverage journals and track symptoms (can use Orteq torres or My Symptoms).    2. Moderate fiber while having flare. As you feel a little better, have less bloody stools, slowly increase and more fiber containing foods into diet (may want to start with more  soluble fiber). Start by adding 1 higher fiber food per meal and increase as tolerated.   -Add some oatmeal to your usual breakfast meal.   -Try some ripe melon or can try ripe peaches or nectarines.(can removed skins if not tolerates).    3. While having a flare, may want to avoid Gladys drinks due to the sugar alcohols.    4. Chew food very well before swallowing. Think chewing to applesauce consistency before swallowing.     Nutrition Monitoring and Evaluation: Will monitor adherence to nutrition recommendations at any future RD visits.    Further Medical Nutrition Therapy:  Recommended after next MD visit/same day as next MD visit or prn.  Next Appointment (if applicable):  Gave pt scheduling information and number.  Patient was encouraged to call/contact RD with any further questions.    Rebekah Browne, MS, RD, LD      Again, thank you for allowing me to participate in the care of your patient.      Sincerely,    Rebekah Browne RD

## 2019-06-24 NOTE — LETTER
Date:June 26, 2019      Patient was self referred, no letter generated. Do not send.        [facility] Health Information

## 2019-06-24 NOTE — PATIENT INSTRUCTIONS
It Was nice meeting you today:    1. Keep daily food and beverage journals and track symptoms (can use [employer] torres or My Symptoms).    2. Moderate fiber while having flare. As you feel a little better, have less bloody stools, slowly increase and more fiber containing foods into diet (may want to start with more soluble fiber). Start by adding 1 higher fiber food per meal and increase as tolerated.   -Add some oatmeal to your usual breakfast meal.   -Try some ripe melon or can try ripe peaches or nectarines.(can removed skins if not tolerates).    3. While having a flare, may want to avoid Gladys drinks due to the sugar alcohols.    4. Chew food very well before swallowing. Think chewing to applesauce consistency before swallowing.     If you would like to schedule a follow up appointment with Rebekah Browne, Registered Dietitian, please call [phone].    Rebekah Browne, MS, RD, LD

## 2019-06-24 NOTE — PROGRESS NOTES
"[facility] Medical Nutrition Therapy      Time Spent:  60 minutes  Session Type:  Initial Individual Session  Referring Physician:  Dr. Yolanda Molina  Reason for RD Visit:   Nutritional counseling, colitis and education for reintroducing fiber     Nutrition Assessment:  Patient is here for initial visit with Registered Dietitian (RD).  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    Patient stated that she feels like she has slower digestion and c/o bloating and stomach ache within an hour after eating breakfast and then a couple of hours after eating dinner meal will have looser stool. Denies any issues/pain/bloating with/after eating lunch. She stated that she previously (prior to this current flare) had issues with both constipation and loose stool, but now with recent flare she reported  having some bloody stools.  Overall, mornings tend to be the toughest for her especially after breakfast with experiencing symptoms. She denies any weight loss but stated that she has to stay on top of eating enough in order to not lose weight. She reported her usual body weight of 135 lbs. Per MD note on 6/19/19, pt reported weight loss at that visit due to low fiber diet and biking 12 miles. She eats 3 meals per day and a couple of snacks per day. One of her snacks is a \"pre-digested\" high calorie shake that contains 380 calories and that she tolerates. She drinks 64 oz or more water per day, 12 oz coffee with almond milk, occasional green tea and sometimes will drink a Gladys sugar-free drink.     She stated that she eats a healthy diet typically consisting of meat, a small amount of fruit, dairy products, vegetables (cooked currently are better tolerated as well as a more crunchy lettuce such as victor manuel) " "and drinks water, 12 oz coffee with almond milk and a little bit of grape juice since she tolerates this juice. She eats little to no processed foods and refined foods since they \"bother\" her stomach. She stated that in the past she followed a specific carbohydrate diet, and felt that it may have been a little bit helpful but she did not notice a large reduction in symptoms. She will take an over-the-counter enzyme called Digest gold which has 4000 units of lipase in one capsule when she will go out to eat and eat a heavier meal/ meal with red meat such as steak. She does feel like the enzyme is helpful for her to tolerate that heavier meal. Pt referred by MD to discussed diet recommendations and discussed education for reintroducing fiber into diet.     Height:   Ht Readings from Last 1 Encounters:   06/19/19 1.68 m (5' 6.14\")     Weight:  Reported her usual body weight is about 135 lbs.  Wt Readings from Last 10 Encounters:   06/19/19 62.4 kg (137 lb 9.6 oz)     BMI: 22.11    Diet Recall:  (usual recent meals):  Meal Food    Breakfast 6:30am: Scambled egg/egg with cheese/HB egg and banana or apple   Lunch Varies (12:30-1pm): Chicken/fish/pork chop/salmon/venision with 1/2 avocado and green vitcor manuel salad   Dinner 7pm: Same as lunch more green vegetables (edgar/brussels sprouts/gr beans   Snacks 10 Am: 380 calories Pre-digested shake. PM: apple or unsweetened applesauce and ~2x/week has a serving of ice cream or occasinally tortilla chips and salsa    Beverages 12 oz  coffee with almond milk, 64 oz or more water, occas green tea. Every other day has a bottle of Gladys sugar free/Gladys SF Blueberry water drink   Alcohol Intake Infrequently (1-2 drinks per month).       Labs:  Reviewed  Pertinent Medications/vitamin and mineral supplements:    Pt stated that she takes Digest Gold enzymes which as 4000 units lipase (takes with a meal such as steak/heavier meal). Also reported taking mesalamine.  Food Allergies: NKFA  Food " intolerances: If has more than one cup of coffee, spicy foods, potatoes, corn. Alcohol gives her headaches.  Physical Activity:  Exercises 4-5 times per week: Elliptical (30-45 mins) or biking for 10-12 miles (45-50 minutes).and weight training. Active moving at work all day  Estimated Nutrition Needs based on current body weight of 62k-2170 calories (30-35 kcals/kg), 62g-74g protein (1-1.2g/kg), ~1 ml/kcal or total fluids per MD.     MALNUTRITION:  % Weight Loss:  None noted  % Intake:  Decreased intake does not meet malnutrition criteria  Subcutaneous Fat Loss:  None observed  Muscle Loss:  None observed  Fluid Retention:  None noted    Malnutrition Diagnosis: Patient does not meet two of the above criteria necessary for diagnosing malnutrition In Context of:  Chronic illness or disease.    Nutrition Diagnosis:    Food and nutrition related knowledge deficit related to lack of previous diet education for colitis, fiber reintroduction as evidenced by pt report and interest in diet education with questions.    Nutrition Prescription: General healthful diet as tolerated. Slowly incorporate fiber into diet as tolerated.    Nutrition Intervention:    Nutrition Education/Counseling:  Provided diet education to assist in patient tolerating general healthful diet while managing symptoms. Explained that if lower fiber diet currently well tolerated, then she can continue to peel and cook vegetables well, softer fruit such as banana, ripe melon, applesauce and can try peeled apple if better tolerated and slowly reintroduce fiber containing foods as tolerated. Explained slowly reintroducing foods back into diet as tolerated and not to get large loads of fiber all of a sudden or all at one meal at this time since she has been following a lower fiber diet currently. Reviewed both insoluble and soluble fiber roles in body, sources of both and told patient that she can slowly add in a fiber sources at meal as tolerated.  Told pt she can start with more soluble fiber if continuing to have loose stool and add in one serving/small serving of a fiber-containing/moderate to higher fiber food at each meal initially as tolerated. If tolerated, can continue to increase/include more fiber (any type) containing foods/servings as tolerated and as desired. Explained that with diverticular disease and when not having a flare, recommendation is to eat adequate fiber daily. Discussed general fiber recommendation amounts of getting a total of 25-30 grams per day. Additionally explained to patient that the Gladys sugar free drinks due to they contain sugar alcohols which may contribute to bloating and loose stools. Based on pt interest/preference, briefly gave overview/reviewed the AID diet which is based loosely on the specific carbohydrate diet with food list for the various phases I, II and III depending on symptoms, but told patient since she is tolerating a variety of foods then do not recommend decreasing or limiting foods and recommended increasing food options as tolerated instead.  Answered pt's questions. She expressed understanding of education provided. See goals below.    Educational Materials Provided:  [place] soluble fiber handout (also explained and show sources of insoluble fiber) and AID diet and food list.    Goals:  1. Keep daily food and beverage journals and track symptoms (can use iGoOn s.r.l. torres or My Symptoms).    2. Moderate fiber while having flare. As you feel a little better, have less bloody stools, slowly increase and more fiber containing foods into diet (may want to start with more soluble fiber). Start by adding 1 higher fiber food per meal and increase as tolerated.   -Add some oatmeal to your usual breakfast meal.   -Try some ripe melon or can try ripe peaches or nectarines.(can removed skins if not tolerates).    3. While having a flare, may want to avoid Gladys drinks due to the sugar alcohols.    4. Chew food very well  before swallowing. Think chewing to applesauce consistency before swallowing.     Nutrition Monitoring and Evaluation: Will monitor adherence to nutrition recommendations at any future RD visits.    Further Medical Nutrition Therapy:  Recommended after next MD visit/same day as next MD visit or prn.  Next Appointment (if applicable):  Gave pt scheduling information and number.  Patient was encouraged to call/contact RD with any further questions.    Rebekah Browne, MS, RD, LD

## 2019-06-26 NOTE — PROGRESS NOTES
I performed a history and physical examination of the above patient and discussed the management with Dr. Valencia on 6/19/2019. I reviewed the note and there are no changes to the past medical, family or social history.  A complete 10 point review of systems was obtained. Please see the HPI for pertinent positives and negatives. All other systems were reviewed and were found to be negative.     I agree with the documented findings and plan of care as outlined.    Yolanda Molina MD  GI Attending  Pager: [phone]

## 2019-06-27 LAB
ALT SERPL-CCNC: 14 U/L (ref 7–40)
AST SERPL-CCNC: 15 U/L (ref 13–40)
CREAT SERPL-MCNC: 0.8 MG/DL (ref 0.5–1.2)
GFR SERPL CREATININE-BSD FRML MDRD: 89.5 ML/MIN/1.73M2
GLUCOSE SERPL-MCNC: 92 MG/DL (ref 73–126)
POTASSIUM SERPL-SCNC: 3.8 MMOL/L (ref 3.5–5.1)

## 2019-07-02 ENCOUNTER — TELEPHONE (OUTPATIENT)
Dept: GASTROENTEROLOGY | Facility: CLINIC | Age: 44
End: 2019-07-02

## 2019-07-03 NOTE — TELEPHONE ENCOUNTER
Received a call from Dr. Ybarra [place] emergency department.  Patient instructed to go to the ED with hematochezia.  White blood cell count was minimally elevated, no fever. Hemoglobin slightly low.  Abdominal exam was diffusely tender but without rebound or peritoneal signs.      Recommended ruling out infectious etiologies.  OK to use short course of prednisone if no infection.

## 2019-07-03 NOTE — TELEPHONE ENCOUNTER
Received a call from the patient regarding her ongoing symptoms around 4 PM.      45 yo F with recent diagnosis of segmental colitis associated with diverticular disease who recently saw Dr Valencia and Dr Molina in outpatient clinic for her ongoing symptoms. She is currently on Lialda.    Patient reported on the phone call that she had been having worsening lower abdominal pain and increased blood in the stool. No fevers or chills, no dizziness. Has been having more frequent stools as well.    At this point, I recommended that patient follow up immediately in the closest ED. I emphasized that worsening symptoms could be many things and needs appropriate workup including blood count and infectious workup, and therefore needs to be seen by a provider. I did not prescribe any new medications as this would not be appropriate. Patient stated that she was about 1[place]5 hours north of [place] and that would be the closest place for her. I strongly urged her to go to the ED.    Anyi JIMENEZ MD  GI Fellow

## 2019-07-03 NOTE — TELEPHONE ENCOUNTER
[facility] Call Center    Phone Message    May a detailed message be left on voicemail: yes    Reason for Call: Symptoms or Concerns     If patient has red-flag symptoms, warm transfer to triage line    Current symptom or concern: Patient said when she stool it is severe bleeding. I advised per red flag to hang up and call 911. She said she was just released from the hospital and was given prednisone. The doctors sis not think it was necessary to do a CT, patient want a call back to see if she need to get scheduled or what the provider suggest. Ptient refused to follow red flag request    Symptoms have been present for:  Since Sunday    Has patient previously been seen for this? Yes    By: Dr. Yolanda Lyn  Date:     Are there any new or worsening symptoms? Yes:       Action Taken: Message routed to:  [facility]

## 2019-07-05 ENCOUNTER — PATIENT OUTREACH (OUTPATIENT)
Dept: GASTROENTEROLOGY | Facility: CLINIC | Age: 44
End: 2019-07-05

## 2019-07-05 ENCOUNTER — MYC MEDICAL ADVICE (OUTPATIENT)
Dept: GASTROENTEROLOGY | Facility: CLINIC | Age: 44
End: 2019-07-05

## 2019-07-05 DIAGNOSIS — R19.7 DIARRHEA: Primary | ICD-10-CM

## 2019-07-05 DIAGNOSIS — K50.90 CROHN'S DISEASE (H): ICD-10-CM

## 2019-07-05 NOTE — TELEPHONE ENCOUNTER
Pt states that will not have her cdiff  Results  from her [place] visit until Monday. Pt has specimen containers at home so will bring in today if at all possible.  Will add c diff stool order. In basket message to Dr. Molina.

## 2019-07-05 NOTE — PROGRESS NOTES
Called pt in response to a my chart message sent late July 3. Pt was on vacation in [place] and seen in an urgent/emergency room.  Pt was experiencing loose stools and blood in stool. Started on Prednisone 20 mg bid for 7 days. Stools are more formed. Pt did provide a c diff stool study while in [place] and did not have the results.  Discussed with Dr. Molina. Await  the results of the stool study.

## 2019-07-06 DIAGNOSIS — K52.9 COLITIS: ICD-10-CM

## 2019-07-06 DIAGNOSIS — K50.90 CROHN'S DISEASE (H): ICD-10-CM

## 2019-07-06 DIAGNOSIS — R19.7 DIARRHEA: ICD-10-CM

## 2019-07-06 LAB
C DIFF TOX B STL QL: NEGATIVE
SPECIMEN SOURCE: NORMAL

## 2019-07-08 ENCOUNTER — PATIENT OUTREACH (OUTPATIENT)
Dept: GASTROENTEROLOGY | Facility: CLINIC | Age: 44
End: 2019-07-08

## 2019-07-08 DIAGNOSIS — K50.10 SEGMENTAL COLITIS ASSOCIATED WITH DIVERTICULOSIS (H): Primary | ICD-10-CM

## 2019-07-08 DIAGNOSIS — K57.30 SEGMENTAL COLITIS ASSOCIATED WITH DIVERTICULOSIS (H): Primary | ICD-10-CM

## 2019-07-08 LAB
CALPROTECTIN STL-MCNT: 777 MG/KG (ref 0–49.9)
G LAMBLIA AG STL QL IA: NORMAL
O+P STL MICRO: NORMAL
SPECIMEN SOURCE: NORMAL
SPECIMEN SOURCE: NORMAL

## 2019-07-08 RX ORDER — CIPROFLOXACIN 500 MG/1
500 TABLET, FILM COATED ORAL 2 TIMES DAILY
Qty: 20 TABLET | Refills: 0 | Status: SHIPPED | OUTPATIENT
Start: 2019-07-08 | End: 2019-07-30

## 2019-07-08 NOTE — PROGRESS NOTES
Discussed with Dr. Molina. Will start on cipro. Stop prednisone and enemas.   Pt informed and in agreement with the plan.

## 2019-07-08 NOTE — PROGRESS NOTES
Patient called in this am with the following symptom update. Pt aware that c diff was negative.  Continues to have cramping she states in the area that defined as segmental colitis. Formed stools now.  States 2 a day which half of the stool is blood. Has been bleeding since February. Pt was started on prednisone when seen in [place] when she was vacationing. States when she uses the enemas she is experiencing more pain and does not want to continue enemas.  Pain radiates in to lower abdomen. . Pt states she is back at work as she has no more PTO time and wants to be off prednisone. Will page Dr. Molina for further recommendations.

## 2019-07-12 ENCOUNTER — PATIENT OUTREACH (OUTPATIENT)
Dept: GASTROENTEROLOGY | Facility: CLINIC | Age: 44
End: 2019-07-12

## 2019-07-12 NOTE — PROGRESS NOTES
Pt reports as of today she feels she has turned the corner and is doing a lot better. Pt will continue the cipro and will notify clinic if she has concerns. Pt has off office number for fellows.

## 2019-07-22 ENCOUNTER — TRANSFERRED RECORDS (OUTPATIENT)
Dept: HEALTH INFORMATION MANAGEMENT | Facility: CLINIC | Age: 44
End: 2019-07-22

## 2019-07-22 LAB
ALT SERPL-CCNC: 16 U/L (ref 7–40)
AST SERPL-CCNC: 19 U/L (ref 13–40)
CREAT SERPL-MCNC: 0.72 MG/DL (ref 0.5–1.2)
GFR SERPL CREATININE-BSD FRML MDRD: 101.5 ML/MIN/1.73M2
GLUCOSE SERPL-MCNC: 96 MG/DL (ref 73–126)
POTASSIUM SERPL-SCNC: 3.5 MMOL/L (ref 3.5–5.1)

## 2019-07-29 ENCOUNTER — PATIENT OUTREACH (OUTPATIENT)
Dept: GASTROENTEROLOGY | Facility: CLINIC | Age: 44
End: 2019-07-29

## 2019-07-29 DIAGNOSIS — K57.30 SEGMENTAL COLITIS ASSOCIATED WITH DIVERTICULOSIS (H): ICD-10-CM

## 2019-07-29 DIAGNOSIS — K50.10 SEGMENTAL COLITIS ASSOCIATED WITH DIVERTICULOSIS (H): ICD-10-CM

## 2019-07-29 NOTE — PROGRESS NOTES
"Pt calling in to report that she is having formed stools but as of yesterday having blood in her stools. States she is having pain at \"sigmoid colon area\" she rates as a 4 and constant.  Not a cramping pain but constant. Taking Lialda 4.8.  Finished cipro on the 18th of July.  Was on a bland diet but has stopped as she has lost 8 pounds. Going out of town for business for 3 days so concerned about the increase of pain and blood in stools. Will route to Dr. Molina for further recommendations.  Her fecal calprotectin  was 777 results on  July 6.   "

## 2019-07-30 RX ORDER — CIPROFLOXACIN 500 MG/1
500 TABLET, FILM COATED ORAL 2 TIMES DAILY
Qty: 28 TABLET | Refills: 2 | Status: SHIPPED | OUTPATIENT
Start: 2019-07-30 | End: 2019-07-30

## 2019-07-30 RX ORDER — CIPROFLOXACIN 500 MG/1
500 TABLET, FILM COATED ORAL 2 TIMES DAILY
Qty: 28 TABLET | Refills: 2 | Status: SHIPPED | OUTPATIENT
Start: 2019-07-30 | End: 2020-04-13

## 2019-07-30 NOTE — PROGRESS NOTES
Discussed symptoms with Dr. Molina. Will restart of vancomycin stay on until colonoscopy. Orders  placed. Left a message for pt with the plan outlined. Also my chart message.

## 2019-07-31 ENCOUNTER — PATIENT OUTREACH (OUTPATIENT)
Dept: GASTROENTEROLOGY | Facility: CLINIC | Age: 44
End: 2019-07-31

## 2019-07-31 ENCOUNTER — TELEPHONE (OUTPATIENT)
Dept: GASTROENTEROLOGY | Facility: CLINIC | Age: 44
End: 2019-07-31

## 2019-07-31 NOTE — PROGRESS NOTES
Called patient and discussed the reasoning behind her colonoscopy.  Patient will start her antibiotics today and then schedule the colonoscopy for when she is finished with the antibiotics patient will be in contact if she has more bleeding or increased abdominal pain during this time.

## 2019-08-01 ENCOUNTER — TELEPHONE (OUTPATIENT)
Dept: GASTROENTEROLOGY | Facility: CLINIC | Age: 44
End: 2019-08-01

## 2019-08-02 ENCOUNTER — TELEPHONE (OUTPATIENT)
Dept: GASTROENTEROLOGY | Facility: CLINIC | Age: 44
End: 2019-08-02

## 2019-08-05 ENCOUNTER — TELEPHONE (OUTPATIENT)
Dept: GASTROENTEROLOGY | Facility: CLINIC | Age: 44
End: 2019-08-05

## 2019-08-05 NOTE — TELEPHONE ENCOUNTER
Patient Name: Velma Mcfarlane   : 1975  MRN: [id number]       : [x] N/A   [] Yes:  Language  /  ID:      VM with information needed to complete pre-assessment call.  Request pt contact Endoscopy Pre-assessment RN to complete upcoming procedure information.  This information may be complete by VM if necessary. Telephone call-back number provided.    Laquita Ellis, RN  [facility]    Additional Information regarding appointment:      Patient scheduled for:  [] EGD  [x] Colonoscopy  [] EUS  [] Flex Sig   [] Other:      Indication for procedure. [] Screening   [x]  Segmental colitis associated with diverticulosis (H) [K50.10, K57.30]     Sedation Type: [x] Conscious Sedation   [] MAC   [] None    Procedure & Referring Provider:  Dr. Molina       Arrival time verified: 7 am / Monday / 19    Facility location verified:   [x][facility] [address], 5th floor     [][facility] [place] [facility] [address], 3rd Floor Surgery check-in      Prep Type:   [x]Golytely eRx:  ;  [] MoviPrep:  , [] MiraLax:  , [] Fleet x 2:    []NPO /p MN, No solid food /p 2200 the night before    Anticoagulants or blood thinners: [x]None [] ASA 81mg  - may continue           [] Warfarin   [] Warfarin + Lovenox bridge [] Plavix [] Effient [] Eliquis         [] Xarelto  [] Brilinta [] NSAIDS  [] Other     LAST anticoagulant dose: Date/Time:    INR:      Electronic implanted devices: [x] No  [] IPG  []  ICD  []  LVAD  []      H&P / Pre op physical completed: [x] N/A, [] Complete, Date  , [] Scheduled, Date  , [] No,      Additional Information:

## 2019-08-12 ENCOUNTER — HOSPITAL ENCOUNTER (OUTPATIENT)
Facility: AMBULATORY SURGERY CENTER | Age: 44
End: 2019-08-12
Attending: INTERNAL MEDICINE
Payer: COMMERCIAL

## 2019-08-12 VITALS
HEIGHT: 67 IN | BODY MASS INDEX: 20.4 KG/M2 | OXYGEN SATURATION: 95 % | SYSTOLIC BLOOD PRESSURE: 99 MMHG | RESPIRATION RATE: 16 BRPM | TEMPERATURE: 98.1 F | WEIGHT: 130 LBS | DIASTOLIC BLOOD PRESSURE: 66 MMHG | HEART RATE: 87 BPM

## 2019-08-12 LAB
COLONOSCOPY: NORMAL
HCG UR QL: NEGATIVE
INTERNAL QC OK POCT: YES

## 2019-08-12 RX ORDER — FENTANYL CITRATE 50 UG/ML
INJECTION, SOLUTION INTRAMUSCULAR; INTRAVENOUS PRN
Status: DISCONTINUED | OUTPATIENT
Start: 2019-08-12 | End: 2019-08-12 | Stop reason: HOSPADM

## 2019-08-12 RX ORDER — FLUMAZENIL 0.1 MG/ML
0.2 INJECTION, SOLUTION INTRAVENOUS
Status: CANCELLED | OUTPATIENT
Start: 2019-08-12 | End: 2019-08-13

## 2019-08-12 RX ORDER — ONDANSETRON 2 MG/ML
4 INJECTION INTRAMUSCULAR; INTRAVENOUS
Status: DISCONTINUED | OUTPATIENT
Start: 2019-08-12 | End: 2019-08-13 | Stop reason: HOSPADM

## 2019-08-12 RX ORDER — LIDOCAINE 40 MG/G
CREAM TOPICAL
Status: DISCONTINUED | OUTPATIENT
Start: 2019-08-12 | End: 2019-08-13 | Stop reason: HOSPADM

## 2019-08-12 RX ORDER — ONDANSETRON 4 MG/1
4 TABLET, ORALLY DISINTEGRATING ORAL EVERY 6 HOURS PRN
Status: CANCELLED | OUTPATIENT
Start: 2019-08-12

## 2019-08-12 RX ORDER — ONDANSETRON 2 MG/ML
4 INJECTION INTRAMUSCULAR; INTRAVENOUS EVERY 6 HOURS PRN
Status: CANCELLED | OUTPATIENT
Start: 2019-08-12

## 2019-08-12 RX ORDER — NALOXONE HYDROCHLORIDE 0.4 MG/ML
.1-.4 INJECTION, SOLUTION INTRAMUSCULAR; INTRAVENOUS; SUBCUTANEOUS
Status: CANCELLED | OUTPATIENT
Start: 2019-08-12 | End: 2019-08-13

## 2019-08-12 ASSESSMENT — MIFFLIN-ST. JEOR: SCORE: 1272.31

## 2019-08-12 NOTE — OR NURSING
Procedure: Colonoscopy with biopsies  Sedation: Conscious sedation (175 mg fentanyl, 5 mcg versed)  O2: 2 LPM NC  Tolerated: VS stable during and post procedure. Not c/o abdominal or chest pain  Report: Given to recovery RN  Pt to recovery area in stable condition, accompanied by RN    Rosario Burden RN

## 2019-08-12 NOTE — DISCHARGE INSTRUCTIONS
Discharge Instructions after Colonoscopy  or Sigmoidoscopy    Today you had a _x___ Colonoscopy ____ Sigmoidoscopy    Activity and Diet  You were given medicine for pain. You may be dizzy or sleepy.  For 24 hours:    Do not drive or use heavy equipment.    Do not make important decisions.    Do not drink any alcohol.  You may return to your normal diet and medicines.    Discomfort    Air was placed in your colon during the exam in order to see it. Walking helps to pass the air.    You may take Tylenol (acetaminophen) for pain unless your doctor has told you not to.  Do not take aspirin or ibuprofen (Advil, Motrin, or other anti-inflammatory  drugs) for _____ days.    Follow-up  ____ We took small tissue samples or polyps to study. Your doctor will call you with the results  within two weeks.    When to call:    Call right away if you have:    Unusual pain in belly or chest pain not relieved with passing air.    More than 1 to 2 Tablespoons of bleeding from your rectum.    Fever above 100.6  F (37.5  C).    If you have severe pain, bleeding, or shortness of breath, go to an emergency room.    If you have questions, call:  Monday to Friday, 7 a.m. to 4:30 p.m.  Endoscopy: [phone] (We may have to call you back)    After hours  Hospital: [phone] (Ask for the GI fellow on call)

## 2019-08-13 LAB — COPATH REPORT: NORMAL

## 2019-08-21 ENCOUNTER — PATIENT OUTREACH (OUTPATIENT)
Dept: GASTROENTEROLOGY | Facility: CLINIC | Age: 44
End: 2019-08-21

## 2019-08-21 ENCOUNTER — NURSE TRIAGE (OUTPATIENT)
Dept: NURSING | Facility: CLINIC | Age: 44
End: 2019-08-21

## 2019-08-21 DIAGNOSIS — C20 MALIGNANT NEOPLASM OF RECTUM (H): Primary | ICD-10-CM

## 2019-08-21 DIAGNOSIS — K57.30 SEGMENTAL COLITIS ASSOCIATED WITH DIVERTICULOSIS (H): Primary | ICD-10-CM

## 2019-08-21 DIAGNOSIS — K50.10 SEGMENTAL COLITIS ASSOCIATED WITH DIVERTICULOSIS (H): Primary | ICD-10-CM

## 2019-08-21 RX ORDER — METRONIDAZOLE 250 MG/1
250 TABLET ORAL 3 TIMES DAILY
Qty: 42 TABLET | Refills: 0 | Status: SHIPPED | OUTPATIENT
Start: 2019-08-21 | End: 2019-09-18

## 2019-08-21 RX ORDER — MESALAMINE 1.2 G/1
TABLET, DELAYED RELEASE ORAL
Qty: 360 TABLET | Refills: 1 | Status: SHIPPED | OUTPATIENT
Start: 2019-08-21 | End: 2019-11-20

## 2019-08-21 RX ORDER — PREDNISONE 5 MG/1
TABLET ORAL
Qty: 320 TABLET | Refills: 1 | Status: SHIPPED | OUTPATIENT
Start: 2019-08-21 | End: 2022-11-22

## 2019-08-21 RX ORDER — CIPROFLOXACIN 500 MG/1
500 TABLET, FILM COATED ORAL 2 TIMES DAILY
Qty: 28 TABLET | Refills: 0 | Status: SHIPPED | OUTPATIENT
Start: 2019-08-21 | End: 2019-09-18

## 2019-08-21 NOTE — TELEPHONE ENCOUNTER
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  She is SP 8/12/2019 COLONOSCOPY, WITH POLYPECTOMY AND BIOPSY with Dr Molina.   Velma Mcfarlane is a 44 year old female who calls with three issues.     1. Rectal bleeding: twice daily with clots mixed in stool. No rectal bleeding between stools.  Rectal bleeding without clots had been ongoing x 6 months, but now has above changes post-colonoscopy.  She spoke to On-call MD yesterday( no note in Epic seen) who advised ED for HGB check but the $1000 fee is difficult for her to manage and does not get her plan of care going forward.  2. Lialda- out of medication:   she doesn't know what next steps are, or if refill indicatedso would like to be contacted regarding plan of care and colonoscopy results.   3.Other sx:  Negative for weakness, dizziness, tachycardia, fever and chest pain.  She is well-hydrated and pushes fluids to at least 6 8 oz glasses per day.  4. Nausea and Abdominal pain LLQ \" sigmoid colon\" hurts to do anything 6/10, nothing really makes it better- does feelSlightly better with BM, then returns to baseline of 6/10- \"just hurting constantly- not achy\".    MEDICATIONS: OUT of LIALDA TODAY       Allergies:   Allergies   Allergen Reactions     Sulfa Drugs Hives     Other reaction(s): *Unknown       ASSESSMENT       Patient seeking advice and plan of care post colonoscopy 8/12/2019- restart Liada? Rectal bleeding, ? Lab order for HGB and results are her issues.Will contact GI with these concerns high priority message. She is on her mobile # now.  Discussed ED as baseline for symptoms per protocol.  RECOMMENDATION/PLAN                                                      RECOMMENDED DISPOSITION:  To ED, another person to drive - if weakness, dizziness, " tachycardia escalating / worsening rectal bleeding and abd pain.  Will comply with recommendation: Trying to avoid ED due to cost right now    If further questions/concerns or if symptoms do not improve, worsen or new symptoms develop, call your PCP or [phone] to talk with the Resident on call, as soon as possible.    Guideline used: rectal bleeding pp 510  Telephone Triage Protocols for Nurses, Fifth Edition, Estefany Wilcox, RN, RN

## 2019-08-21 NOTE — TELEPHONE ENCOUNTER
Dr Molina called patient to discuss next steps in her plan care.    Plan:  1. Start cipro 500 mg twice a day for 14 days   2. Start Flagyl 250 mg tabs three times a day for 14 days  3. Start prednisone 40 mg PO for one week then decrease by one tablet each week until off.  4. Continue mesalamine and a refill will be provided to to the patient      Colon and rectal surgery referral will be placed for the patient.

## 2019-08-23 ENCOUNTER — PATIENT OUTREACH (OUTPATIENT)
Dept: GASTROENTEROLOGY | Facility: CLINIC | Age: 44
End: 2019-08-23

## 2019-08-23 NOTE — PROGRESS NOTES
Called patient and left message asking for information on symptoms.  Left my direct number for contact.    Patient returned my call and stated that she is feeling better today. Will continue the medications and low inflammatory diet.  Patient will call with any other symptoms

## 2019-09-11 ENCOUNTER — TELEPHONE (OUTPATIENT)
Dept: GASTROENTEROLOGY | Facility: CLINIC | Age: 44
End: 2019-09-11

## 2019-09-16 ENCOUNTER — PATIENT OUTREACH (OUTPATIENT)
Dept: GASTROENTEROLOGY | Facility: CLINIC | Age: 44
End: 2019-09-16

## 2019-09-16 DIAGNOSIS — K57.30 SEGMENTAL COLITIS ASSOCIATED WITH DIVERTICULOSIS (H): Primary | ICD-10-CM

## 2019-09-16 DIAGNOSIS — K50.10 SEGMENTAL COLITIS ASSOCIATED WITH DIVERTICULOSIS (H): Primary | ICD-10-CM

## 2019-09-16 NOTE — PROGRESS NOTES
Called pt her request from Dr. Molina in response to a my chart message. Update on symptoms  Pt has an appt with Dr. Molina on September 18  Pt states she is in a holding pattern  Had more pain on the left side previously and not has move to the midline and right side  States wondering if she should have enema to treat this pain.  Pt states she has been doing some reading and wondering about other medications.     Feels she has more inflammation  Suggested that she had have further discussion with Dr. Molina at her clinic visit.

## 2019-09-17 RX ORDER — HYDROCORTISONE 100 MG/60ML
100 SUSPENSION RECTAL AT BEDTIME
Qty: 60 ML | Refills: 3 | Status: SHIPPED | OUTPATIENT
Start: 2019-09-17 | End: 2020-01-14

## 2019-09-17 NOTE — PROGRESS NOTES
Left a message for pt that Dr. Molina ordered cortenema.     Prescription for Yolanda Brown MD [place], Ann RN   Caller: Unspecified (Yesterday,  1:37 PM)             OK. Can prescribe a cortenema for her

## 2019-09-18 ENCOUNTER — OFFICE VISIT (OUTPATIENT)
Dept: GASTROENTEROLOGY | Facility: CLINIC | Age: 44
End: 2019-09-18
Payer: COMMERCIAL

## 2019-09-18 VITALS — HEART RATE: 75 BPM | OXYGEN SATURATION: 97 % | BODY MASS INDEX: 21.25 KG/M2 | WEIGHT: 135.7 LBS

## 2019-09-18 DIAGNOSIS — K50.10 SEGMENTAL COLITIS ASSOCIATED WITH DIVERTICULOSIS (H): Primary | ICD-10-CM

## 2019-09-18 DIAGNOSIS — K57.30 SEGMENTAL COLITIS ASSOCIATED WITH DIVERTICULOSIS (H): Primary | ICD-10-CM

## 2019-09-18 ASSESSMENT — ENCOUNTER SYMPTOMS
WEIGHT LOSS: 1
BLOATING: 1
INCREASED ENERGY: 1
BLOOD IN STOOL: 1
WEIGHT GAIN: 1
CONSTIPATION: 0
RECTAL PAIN: 0
CHILLS: 0
JAUNDICE: 0
HALLUCINATIONS: 0
POLYPHAGIA: 0
FEVER: 0
ALTERED TEMPERATURE REGULATION: 0
POLYDIPSIA: 1
FATIGUE: 1
ABDOMINAL PAIN: 1
NIGHT SWEATS: 0
DECREASED APPETITE: 1
VOMITING: 0
DIARRHEA: 1
NAUSEA: 0
BOWEL INCONTINENCE: 0
HEARTBURN: 0

## 2019-09-18 NOTE — PATIENT INSTRUCTIONS
PLAN  ---Continue the prednisone taper   ---Continue the Lialda 4 pills per day  ---Continue current diet  ---If you flare, will plan to retry cipro and flagyl. If these are ineffective, will add prednisone. If you require more than 2 courses of prednisone per year, we will consider starting a steroid-sparing biologic therapy (preferably vedolizumab aka Entyvio), pending insurance approval    Return for follow up in 10 weeks

## 2019-09-18 NOTE — NURSING NOTE
Chief Complaint   Patient presents with     RECHECK     return IBD       Vitals:    09/18/19 0930   Pulse: 75   SpO2: 97%   Weight: 61.6 kg (135 lb 11.2 oz)       Body mass index is 21.25 kg/m .    Mari Lerma CMA

## 2019-09-18 NOTE — LETTER
9/18/2019       RE: Velma Mcfarlane  [address] [facility] [address]     Dear Colleague,    Thank you for referring your patient, Velma Mcfarlane, to the [facility] GASTROENTEROLOGY AND IBD CLINIC at [facility]. Please see a copy of my visit note below.    IBD CLINIC VISIT -- follow up    CHIEF COMPLAINT: SCAD evaluation and management    SCAD HISTORY  Age at diagnosis: 44  Extent of disease: SCAD, left sided rui-diverticular inflammation  Current SCAD medications: Lialda 4.8 G daily, prednisone 10 mg daily --> 5 mg on Friday    Prior SCAD Medications: Cipro/flagyl (helped)    DISEASE ASSESSMENT  Labs:  Recent Labs   Lab Test 06/19/19  1009   CRP <2.9   SED 9     Endoscopic assessment:     8/2019 icscope showed Dee 2 colitis from 30 cm-40cm. Diverticulosis in the sigmoid. Rest of colon was normal.     PATH:  SPECIMEN(S):   A: Cecal biopsy   B: Colon biopsy, ascending   C: Colon biopsy, transverse   D: Colon biopsy, descending   E: Sigmoid colon biopsy   F: Rectal biopsy     FINAL DIAGNOSIS:   A. Cecum, biopsy:   Colonic mucosa with no pathologic abnormalities     B. Ascending Colon, Biopsy:   Colonic mucosa with no pathologic abnormalities     C. Transverse Colon, biopsy:   Colonic mucosa with no pathologic abnormalities     D. Descending Colon, biopsy:   Colonic mucosa with no pathologic abnormalities     E. Sigmoid Colon, biopsy:   Colitis with crypt injury, moderately active, with rectum sparing (see   part F below); consistent with SCAD   (segmental colitisassociated diverticulosis); negative for dysplasia     F. Rectum, biopsy:   Rectal mucosa with no pathologic abnormalities     EGD 4/15/19 Normal, normal biopsies of esophagus, stomach and duodenum  3/25/19 colonoscopy for rectal bleeding: left sided diverticular with edema, erythema, loss of vascular pattern noted in 2 segments (40-36 cm and 30-22 cm from the anus.    CT ab/pelvis  3/5/19:  Renal hypodensities stable from 4/16/19 exam, 2 mm calculus in right kidney, no gastric or small bowel abnormality  Fecal calprotectin: pending  C diff: negative per OSH records    ASSESSMENT/PLAN  Mrs. Salter is a 44 year old woman with history of cholecystectomy, 3 uncomplicated vaginal deliveries, thyroid cysts (per patient), and recent diagnosis of segmental colitis associated with diverticular disease who is follow up for management of her disease.    Patient with clinical and endoscopic features consistent with SCAD.  This has been difficult to control, with a recent flare necessitating prednisone, antibiotics and mesalamine therapy.  She is now on prednisone 10 mg daily and Lialda 4.8 g daily and is doing well.     Plan:  ---Continue the prednisone taper   ---Continue the Lialda 4 pills per day  ---Continue current diet  ---If flare recurs, will plan to retry cipro and flagyl. If these are ineffective, will add prednisone. If Velma requires more than 2 courses of prednisone per year, we will consider starting a steroid-sparing biologic therapy (preferably vedolizumab aka Entyvio), pending insurance approval    Return to clinic in 10 weeks     I spent a total of 30 minutes, face to face, was spent with this patient, >50% of which was counseling regarding the above delineated issues.      HPI:   Mrs. Salter is a 44 year old woman with history of cholecystectomy, 3 uncomplicated vaginal deliveries, thyroid cysts (per patient), and recent diagnosis of segmental colitis associated with diverticular disease who is establishing care for management of her disease.    Per chart:  Patient seen about 2014 at [facility] for diverticulitis and last seen 5/2019 for follow up of SCAD and nausea.  Patient had subtle symptoms of Patient had colonoscopy 3/2019 with finding of edema, erythema, loss of and loss of vascular patter in 2 segments of left colon with mild to moderate diverticulosis.  Sigmoid biopsies showed  "mildly active chronic colitis consistent with SCAD.  Patient was treated with 10 day course of cipro/flagyl with improvement in symptoms followed by Lialda 4.8 G daily with possible improvement. On 4/11/19 the patient was found to be c.diff negative.      At time of consultation, patient reported frustation with lack of symptom improvement and communication at prior GI providers office.  Here to establish care.  Patient reports 5 years of alternating diarrhea and constipation along with occassional undigested food in stool and borrygymi.  She reports had initial improvement in symptoms following cipro/flagyl and mesalamine but called in as still was having blood streaked stools for which she was started on rowasa enemas.  She is concerned she is still flaring despite the therapies and putting herself on a low residue \"simple carbohydrate diet\" supplemented with \"high protein predigested cancer shakes\".  She reports weight loss in the setting of biking 12 miles daily and changed diet.      At time of consultation the patient reports two semiformed stools daily with blood streaks in all stools (10% blood) and with wiping.  She notes one day a week she will have bloating, increased flatus, general malaise, 5-6 watery BM's along with no associated abdominal pain.      Patient also reports 10 years of chronic food getting stuck in her esophagus until drinks some water with the food.  It doesn't happen with draper, but rather typically with meats.  She has never had a food impaction.  Happens with every meal.  No odynophagia, no nausea currently, no vomiting, rare reflux/heartburn. PPI not helpful in the past.  Reports was having nausea but improved with simple carbohydrate diet.  Reports when was nauseated, it was typically in the morning and improved with a protein breakfast sandwich.    Prior report of dysphagia and nausea,  Had upper endoscopy April 15th 2019, 1 gastric polyp (fundic gland).  Separate mid/distal " esophageal, gastric and duodenal biopsies negative.      Patient notes Hx of thyroid cysts and borderline TSH levels.       Social hx   -no smoking  -1-2 x month nsaids  -rare alcohol   -no illicit  -Bike 12 miles a day, weights    Fam Hx  Mother with history colitis - no issues in years, colon polyps  Maternal grandfather  of colon cancer at age 76  Both parents have diverticulitis    Interval history, 2019  In August, started cipro x 2 weeks, flagyl x 2 weeks, lialda and prednisone. Notices increased pain when delays Lialda by a few hours (increased pain). Now down to prednisone 10 mg daily, going to 5 mg daily on Friday. Feels much improved. Blood has stopped; left sided pain has improved. Gained 5 lb since August. Continues on SCD; met with Rebekah Browne.    No upcoming travel plans.     ROS:    No fevers or chills  No weight loss  No blurry vision, double vision or change in vision  No sore throat  No lymphadenopathy  No headache, paraesthesias, or weakness in a limb  No shortness of breath or wheezing  No chest pain or pressure  No arthralgias or myalgias  No rashes or skin changes  Yes dysphagia  Yes BRBPR, hematochezia  No dysuria, frequency or urgency  No hot/cold intolerance or polyria  No anxiety or depression    Extra intestinal manifestations of IBD:  No uveitis/episcleritis  No aphthous ulcers   No arthritis   No erythema nodosum/pyoderma gangrenosum.     PERTINENT PAST MEDICAL HISTORY:  None     PREVIOUS SURGERIES:  Past Surgical History:   Procedure Laterality Date     CHOLECYSTECTOMY       COLONOSCOPY       COLONOSCOPY N/A 2019    Procedure: COLONOSCOPY, WITH POLYPECTOMY AND BIOPSY;  Surgeon: Yolnada Molina MD;  Location: [facility] OR     Surgical hx  -Cholecystectomy   -3 children - vaginal x 3, Bladder sling placed     PREVIOUS ENDOSCOPY:  See above    ALLERGIES:     Allergies   Allergen Reactions     Sulfa Drugs Hives     Other reaction(s): *Unknown       PERTINENT  MEDICATIONS:    Current Outpatient Medications:      albuterol (PROAIR HFA/PROVENTIL HFA/VENTOLIN HFA) 108 (90 Base) MCG/ACT inhaler, , Disp: , Rfl:      amitriptyline (ELAVIL) 10 MG tablet, Take 10 mg by mouth, Disp: , Rfl:      butalbital-acetaminophen-caffeine (FIORICET/ESGIC) -40 MG tablet, Take 1 tablet by mouth, Disp: , Rfl:      ciprofloxacin (CIPRO) 500 MG tablet, Take 1 tablet (500 mg) by mouth 2 times daily, Disp: 28 tablet, Rfl: 2     hydrocortisone (CORTENEMA) 100 MG/60ML enema, Place 1 enema rectally At Bedtime, Disp: 60 mL, Rfl: 3     mesalamine (LIALDA) 1.2 g EC tablet, TK 4 TS PO QD WITH A MEAL, Disp: 360 tablet, Rfl: 1     mesalamine (ROWASA) 4 g enema, INSERT 1 ENEMA RECTALLY QHS, Disp: , Rfl: 3     predniSONE (DELTASONE) 5 MG tablet, Take 40 mg po for one week, then decrease by one tablet a week until off., Disp: 320 tablet, Rfl: 1    SOCIAL HISTORY:  Social History     Socioeconomic History     Marital status:      Spouse name: Not on file     Number of children: Not on file     Years of education: Not on file     Highest education level: Not on file   Occupational History     Not on file   Social Needs     Financial resource strain: Not on file     Food insecurity:     Worry: Not on file     Inability: Not on file     Transportation needs:     Medical: Not on file     Non-medical: Not on file   Tobacco Use     Smoking status: Never Smoker     Smokeless tobacco: Never Used   Substance and Sexual Activity     Alcohol use: Yes     Comment: occasional     Drug use: Never     Sexual activity: Not on file   Lifestyle     Physical activity:     Days per week: Not on file     Minutes per session: Not on file     Stress: Not on file   Relationships     Social connections:     Talks on phone: Not on file     Gets together: Not on file     Attends Congregational service: Not on file     Active member of club or organization: Not on file     Attends meetings of clubs or organizations: Not on file      Relationship status: Not on file     Intimate partner violence:     Fear of current or ex partner: Not on file     Emotionally abused: Not on file     Physically abused: Not on file     Forced sexual activity: Not on file   Other Topics Concern     Not on file   Social History Narrative     Not on file     FAMILY HISTORY:  History reviewed. No pertinent family history.    PHYSICAL EXAMINATION:  Constitutional: aaox3, cooperative, pleasant, not dyspneic/diaphoretic, no acute distress  Vitals reviewed: Pulse 75   Wt 61.6 kg (135 lb 11.2 oz)   SpO2 97%   BMI 21.25 kg/m     Wt:   Wt Readings from Last 2 Encounters:   09/18/19 61.6 kg (135 lb 11.2 oz)   08/12/19 59 kg (130 lb)      Eyes: Sclera anicteric/injected  Ears/nose/mouth/throat: Normal oropharynx without ulcers or exudate, mucus membranes moist, hearing intact  Neck: supple, thyroid normal size  CV: No edema  Respiratory: Unlabored breathing  Lymph: No axillary, submandibular, supraclavicular or inguinal lymphadenopathy  Abd: Soft, Nondistended, +bs, no hepatosplenomegaly, nontender, no peritoneal signs  Skin: warm, perfused, no jaundice  Psych: Normal affect  MSK: Normal gait      PERTINENT STUDIES:  Most recent hepatic panel:  Recent Labs   Lab Test 07/22/19 06/27/19   ALT 16 14   AST 19 15     Most recent creatinine:  Recent Labs   Lab Test 07/22/19 06/27/19   CR 0.72 0.80       Again, thank you for allowing me to participate in the care of your patient.      Sincerely,    Yolanda Molina MD

## 2019-09-18 NOTE — PROGRESS NOTES
IBD CLINIC VISIT -- follow up    CHIEF COMPLAINT: SCAD evaluation and management    SCAD HISTORY  Age at diagnosis: 44  Extent of disease: SCAD, left sided rui-diverticular inflammation  Current SCAD medications: Lialda 4.8 G daily, prednisone 10 mg daily --> 5 mg on Friday    Prior SCAD Medications: Cipro/flagyl (helped)    DISEASE ASSESSMENT  Labs:  Recent Labs   Lab Test 06/19/19  1009   CRP <2.9   SED 9     Endoscopic assessment:     8/2019 icscope showed Dee 2 colitis from 30 cm-40cm. Diverticulosis in the sigmoid. Rest of colon was normal.     PATH:  SPECIMEN(S):   A: Cecal biopsy   B: Colon biopsy, ascending   C: Colon biopsy, transverse   D: Colon biopsy, descending   E: Sigmoid colon biopsy   F: Rectal biopsy     FINAL DIAGNOSIS:   A. Cecum, biopsy:   Colonic mucosa with no pathologic abnormalities     B. Ascending Colon, Biopsy:   Colonic mucosa with no pathologic abnormalities     C. Transverse Colon, biopsy:   Colonic mucosa with no pathologic abnormalities     D. Descending Colon, biopsy:   Colonic mucosa with no pathologic abnormalities     E. Sigmoid Colon, biopsy:   Colitis with crypt injury, moderately active, with rectum sparing (see   part F below); consistent with SCAD   (segmental colitisassociated diverticulosis); negative for dysplasia     F. Rectum, biopsy:   Rectal mucosa with no pathologic abnormalities     EGD 4/15/19 Normal, normal biopsies of esophagus, stomach and duodenum  3/25/19 colonoscopy for rectal bleeding: left sided diverticular with edema, erythema, loss of vascular pattern noted in 2 segments (40-36 cm and 30-22 cm from the anus.    CT ab/pelvis 3/5/19:  Renal hypodensities stable from 4/16/19 exam, 2 mm calculus in right kidney, no gastric or small bowel abnormality  Fecal calprotectin: pending  C diff: negative per OSH records      ASSESSMENT/PLAN  Mrs. Salter is a 44 year old woman with history of cholecystectomy, 3 uncomplicated vaginal deliveries, thyroid cysts (per  patient), and recent diagnosis of segmental colitis associated with diverticular disease who is follow up for management of her disease.    Patient with clinical and endoscopic features consistent with SCAD.  This has been difficult to control, with a recent flare necessitating prednisone, antibiotics and mesalamine therapy.  She is now on prednisone 10 mg daily and Lialda 4.8 g daily and is doing well.     Plan:  ---Continue the prednisone taper   ---Continue the Lialda 4 pills per day  ---Continue current diet  ---If flare recurs, will plan to retry cipro and flagyl. If these are ineffective, will add prednisone. If Velma requires more than 2 courses of prednisone per year, we will consider starting a steroid-sparing biologic therapy (preferably vedolizumab aka Entyvio), pending insurance approval    Return to clinic in 10 weeks     I spent a total of 30 minutes, face to face, was spent with this patient, >50% of which was counseling regarding the above delineated issues.        HPI:   Mrs. Salter is a 44 year old woman with history of cholecystectomy, 3 uncomplicated vaginal deliveries, thyroid cysts (per patient), and recent diagnosis of segmental colitis associated with diverticular disease who is establishing care for management of her disease.      Per chart:  Patient seen about 2014 at [facility] for diverticulitis and last seen 5/2019 for follow up of SCAD and nausea.  Patient had subtle symptoms of Patient had colonoscopy 3/2019 with finding of edema, erythema, loss of and loss of vascular patter in 2 segments of left colon with mild to moderate diverticulosis.  Sigmoid biopsies showed mildly active chronic colitis consistent with SCAD.  Patient was treated with 10 day course of cipro/flagyl with improvement in symptoms followed by Lialda 4.8 G daily with possible improvement. On 4/11/19 the patient was found to be c.diff negative.      At time of consultation, patient reported frustation with lack of  "symptom improvement and communication at prior GI providers office.  Here to establish care.  Patient reports 5 years of alternating diarrhea and constipation along with occassional undigested food in stool and borrygymi.  She reports had initial improvement in symptoms following cipro/flagyl and mesalamine but called in as still was having blood streaked stools for which she was started on rowasa enemas.  She is concerned she is still flaring despite the therapies and putting herself on a low residue \"simple carbohydrate diet\" supplemented with \"high protein predigested cancer shakes\".  She reports weight loss in the setting of biking 12 miles daily and changed diet.      At time of consultation the patient reports two semiformed stools daily with blood streaks in all stools (10% blood) and with wiping.  She notes one day a week she will have bloating, increased flatus, general malaise, 5-6 watery BM's along with no associated abdominal pain.      Patient also reports 10 years of chronic food getting stuck in her esophagus until drinks some water with the food.  It doesn't happen with draper, but rather typically with meats.  She has never had a food impaction.  Happens with every meal.  No odynophagia, no nausea currently, no vomiting, rare reflux/heartburn. PPI not helpful in the past.  Reports was having nausea but improved with simple carbohydrate diet.  Reports when was nauseated, it was typically in the morning and improved with a protein breakfast sandwich.    Prior report of dysphagia and nausea,  Had upper endoscopy April 15th 2019, 1 gastric polyp (fundic gland).  Separate mid/distal esophageal, gastric and duodenal biopsies negative.      Patient notes Hx of thyroid cysts and borderline TSH levels.       Social hx   -no smoking  -1-2 x month nsaids  -rare alcohol   -no illicit  -Bike 12 miles a day, weights    Fam Hx  Mother with history colitis - no issues in years, colon polyps  Maternal grandfather "  of colon cancer at age 76  Both parents have diverticulitis    Interval history, 2019  In August, started cipro x 2 weeks, flagyl x 2 weeks, lialda and prednisone. Notices increased pain when delays Lialda by a few hours (increased pain). Now down to prednisone 10 mg daily, going to 5 mg daily on Friday. Feels much improved. Blood has stopped; left sided pain has improved. Gained 5 lb since August. Continues on SCD; met with Rebekah Browne.    No upcoming travel plans.     ROS:    No fevers or chills  No weight loss  No blurry vision, double vision or change in vision  No sore throat  No lymphadenopathy  No headache, paraesthesias, or weakness in a limb  No shortness of breath or wheezing  No chest pain or pressure  No arthralgias or myalgias  No rashes or skin changes  Yes dysphagia  Yes BRBPR, hematochezia  No dysuria, frequency or urgency  No hot/cold intolerance or polyria  No anxiety or depression    Extra intestinal manifestations of IBD:  No uveitis/episcleritis  No aphthous ulcers   No arthritis   No erythema nodosum/pyoderma gangrenosum.     PERTINENT PAST MEDICAL HISTORY:  None     PREVIOUS SURGERIES:  Past Surgical History:   Procedure Laterality Date     CHOLECYSTECTOMY       COLONOSCOPY       COLONOSCOPY N/A 2019    Procedure: COLONOSCOPY, WITH POLYPECTOMY AND BIOPSY;  Surgeon: Yolanda Molina MD;  Location: [facility] OR     Surgical hx  -Cholecystectomy   -3 children - vaginal x 3, Bladder sling placed     PREVIOUS ENDOSCOPY:  See above    ALLERGIES:     Allergies   Allergen Reactions     Sulfa Drugs Hives     Other reaction(s): *Unknown       PERTINENT MEDICATIONS:    Current Outpatient Medications:      albuterol (PROAIR HFA/PROVENTIL HFA/VENTOLIN HFA) 108 (90 Base) MCG/ACT inhaler, , Disp: , Rfl:      amitriptyline (ELAVIL) 10 MG tablet, Take 10 mg by mouth, Disp: , Rfl:      butalbital-acetaminophen-caffeine (FIORICET/ESGIC) -40 MG tablet, Take 1 tablet by mouth, Disp: , Rfl:       ciprofloxacin (CIPRO) 500 MG tablet, Take 1 tablet (500 mg) by mouth 2 times daily, Disp: 28 tablet, Rfl: 2     hydrocortisone (CORTENEMA) 100 MG/60ML enema, Place 1 enema rectally At Bedtime, Disp: 60 mL, Rfl: 3     mesalamine (LIALDA) 1.2 g EC tablet, TK 4 TS PO QD WITH A MEAL, Disp: 360 tablet, Rfl: 1     mesalamine (ROWASA) 4 g enema, INSERT 1 ENEMA RECTALLY QHS, Disp: , Rfl: 3     predniSONE (DELTASONE) 5 MG tablet, Take 40 mg po for one week, then decrease by one tablet a week until off., Disp: 320 tablet, Rfl: 1    SOCIAL HISTORY:  Social History     Socioeconomic History     Marital status:      Spouse name: Not on file     Number of children: Not on file     Years of education: Not on file     Highest education level: Not on file   Occupational History     Not on file   Social Needs     Financial resource strain: Not on file     Food insecurity:     Worry: Not on file     Inability: Not on file     Transportation needs:     Medical: Not on file     Non-medical: Not on file   Tobacco Use     Smoking status: Never Smoker     Smokeless tobacco: Never Used   Substance and Sexual Activity     Alcohol use: Yes     Comment: occasional     Drug use: Never     Sexual activity: Not on file   Lifestyle     Physical activity:     Days per week: Not on file     Minutes per session: Not on file     Stress: Not on file   Relationships     Social connections:     Talks on phone: Not on file     Gets together: Not on file     Attends Yazidi service: Not on file     Active member of club or organization: Not on file     Attends meetings of clubs or organizations: Not on file     Relationship status: Not on file     Intimate partner violence:     Fear of current or ex partner: Not on file     Emotionally abused: Not on file     Physically abused: Not on file     Forced sexual activity: Not on file   Other Topics Concern     Not on file   Social History Narrative     Not on file     FAMILY HISTORY:  History reviewed.  No pertinent family history.    PHYSICAL EXAMINATION:  Constitutional: aaox3, cooperative, pleasant, not dyspneic/diaphoretic, no acute distress  Vitals reviewed: Pulse 75   Wt 61.6 kg (135 lb 11.2 oz)   SpO2 97%   BMI 21.25 kg/m    Wt:   Wt Readings from Last 2 Encounters:   09/18/19 61.6 kg (135 lb 11.2 oz)   08/12/19 59 kg (130 lb)      Eyes: Sclera anicteric/injected  Ears/nose/mouth/throat: Normal oropharynx without ulcers or exudate, mucus membranes moist, hearing intact  Neck: supple, thyroid normal size  CV: No edema  Respiratory: Unlabored breathing  Lymph: No axillary, submandibular, supraclavicular or inguinal lymphadenopathy  Abd: Soft, Nondistended, +bs, no hepatosplenomegaly, nontender, no peritoneal signs  Skin: warm, perfused, no jaundice  Psych: Normal affect  MSK: Normal gait      PERTINENT STUDIES:  Most recent hepatic panel:  Recent Labs   Lab Test 07/22/19 06/27/19   ALT 16 14   AST 19 15     Most recent creatinine:  Recent Labs   Lab Test 07/22/19 06/27/19   CR 0.72 0.80     Answers for HPI/ROS submitted by the patient on 9/18/2019   General Symptoms: Yes  Skin Symptoms: No  HENT Symptoms: No  EYE SYMPTOMS: No  HEART SYMPTOMS: No  LUNG SYMPTOMS: No  INTESTINAL SYMPTOMS: Yes  URINARY SYMPTOMS: No  GYNECOLOGIC SYMPTOMS: No  BREAST SYMPTOMS: No  SKELETAL SYMPTOMS: No  BLOOD SYMPTOMS: No  NERVOUS SYSTEM SYMPTOMS: No  MENTAL HEALTH SYMPTOMS: No  Fever: No  Loss of appetite: Yes  Weight loss: Yes  Weight gain: Yes  Fatigue: Yes  Night sweats: No  Chills: No  Increased stress: Yes  Excessive hunger: No  Excessive thirst: Yes  Feeling hot or cold when others believe the temperature is normal: No  Loss of height: No  Post-operative complications: No  Surgical site pain: No  Hallucinations: No  Change in or Loss of Energy: Yes  Hyperactivity: No  Confusion: No  Heart burn or indigestion: No  Nausea: No  Vomiting: No  Abdominal pain: Yes  Bloating: Yes  Constipation: No  Diarrhea: Yes  Blood in stool:  Yes  Black stools: No  Rectal or Anal pain: No  Fecal incontinence: No  Yellowing of skin or eyes: No  Vomit with blood: No  Change in stools: Yes

## 2019-09-26 ENCOUNTER — DOCUMENTATION ONLY (OUTPATIENT)
Dept: CARE COORDINATION | Facility: CLINIC | Age: 44
End: 2019-09-26

## 2019-11-03 ENCOUNTER — HEALTH MAINTENANCE LETTER (OUTPATIENT)
Age: 44
End: 2019-11-03

## 2019-11-07 ENCOUNTER — PATIENT OUTREACH (OUTPATIENT)
Dept: GASTROENTEROLOGY | Facility: CLINIC | Age: 44
End: 2019-11-07

## 2019-11-07 NOTE — PROGRESS NOTES
Called patient and left my direct call back number for the patient to return my call and discuss her appointment

## 2019-11-18 ENCOUNTER — TELEPHONE (OUTPATIENT)
Dept: GASTROENTEROLOGY | Facility: CLINIC | Age: 44
End: 2019-11-18

## 2019-11-18 NOTE — TELEPHONE ENCOUNTER
Called and left message for patient reminding of appointment scheduled on 11/20/19 at 1040am with [facility] GI clinic. Patient to arrive 15 min early. To reschedule or cancel patient to call [phone].    YOEL Cid

## 2019-11-19 ASSESSMENT — ENCOUNTER SYMPTOMS
NAUSEA: 1
ALTERED TEMPERATURE REGULATION: 1
FEVER: 0
ABDOMINAL PAIN: 1
INCREASED ENERGY: 0
WEIGHT LOSS: 0
RECTAL PAIN: 0
VOMITING: 0
HALLUCINATIONS: 0
BLOATING: 0
DECREASED APPETITE: 0
WEIGHT GAIN: 1
JAUNDICE: 0
CHILLS: 1
BLOOD IN STOOL: 0
FATIGUE: 1
BOWEL INCONTINENCE: 0
HEARTBURN: 1
DIARRHEA: 1
CONSTIPATION: 0

## 2019-11-20 ENCOUNTER — OFFICE VISIT (OUTPATIENT)
Dept: GASTROENTEROLOGY | Facility: CLINIC | Age: 44
End: 2019-11-20
Payer: COMMERCIAL

## 2019-11-20 VITALS
HEIGHT: 67 IN | BODY MASS INDEX: 21.25 KG/M2 | SYSTOLIC BLOOD PRESSURE: 114 MMHG | HEART RATE: 67 BPM | DIASTOLIC BLOOD PRESSURE: 68 MMHG | OXYGEN SATURATION: 100 % | TEMPERATURE: 97.7 F

## 2019-11-20 DIAGNOSIS — K50.10 SEGMENTAL COLITIS ASSOCIATED WITH DIVERTICULOSIS (H): ICD-10-CM

## 2019-11-20 DIAGNOSIS — K57.30 SEGMENTAL COLITIS ASSOCIATED WITH DIVERTICULOSIS (H): ICD-10-CM

## 2019-11-20 RX ORDER — MESALAMINE 1.2 G/1
4800 TABLET, DELAYED RELEASE ORAL DAILY
Qty: 360 TABLET | Refills: 3 | Status: SHIPPED | OUTPATIENT
Start: 2019-11-20 | End: 2020-02-18

## 2019-11-20 RX ORDER — MESALAMINE 4 G/60ML
4 SUSPENSION RECTAL AT BEDTIME
Qty: 90 ENEMA | Refills: 3 | Status: SHIPPED | OUTPATIENT
Start: 2019-11-20 | End: 2020-12-09

## 2019-11-20 ASSESSMENT — PAIN SCALES - GENERAL: PAINLEVEL: NO PAIN (0)

## 2019-11-20 NOTE — PATIENT INSTRUCTIONS
PLAN  ---Great to know that you are doing well!  --------------Try to wean off the cortenema and start the Rowasa enema instead.   --------------If you require steroids long-term, will need to consider a steroid-sparing medication, such as vedolizumab (aka Entyvio).     If you have any questions, please don't hesitate to contact Elizabeth Dickerson, the Gastroenterology nurse at [phone].

## 2019-11-20 NOTE — LETTER
11/20/2019       RE: Velma Mcfarlane  [address] [id number]     Dear Colleague,    Thank you for referring your patient, Velma Mcfarlane, to the [facility] GASTRO[facility] AND IBD CLINIC at [facility]. Please see a copy of my visit note below.    IBD CLINIC VISIT -- follow up    CHIEF COMPLAINT: SCAD evaluation and management    SCAD HISTORY  Age at diagnosis: 44  Extent of disease: SCAD, left sided rui-diverticular inflammation  Current SCAD medications: Lialda 4.8 G daily, prednisone 10 mg daily --> 5 mg on Friday    Prior SCAD Medications: Cipro/flagyl (helped)    DISEASE ASSESSMENT  Labs:  Recent Labs   Lab Test 06/19/19  1009   CRP <2.9   SED 9     Endoscopic assessment:     8/2019 icscope showed Dee 2 colitis from 30 cm-40cm. Diverticulosis in the sigmoid. Rest of colon was normal.     PATH:  SPECIMEN(S):   A: Cecal biopsy   B: Colon biopsy, ascending   C: Colon biopsy, transverse   D: Colon biopsy, descending   E: Sigmoid colon biopsy   F: Rectal biopsy     FINAL DIAGNOSIS:   A. Cecum, biopsy:   Colonic mucosa with no pathologic abnormalities     B. Ascending Colon, Biopsy:   Colonic mucosa with no pathologic abnormalities     C. Transverse Colon, biopsy:   Colonic mucosa with no pathologic abnormalities     D. Descending Colon, biopsy:   Colonic mucosa with no pathologic abnormalities     E. Sigmoid Colon, biopsy:   Colitis with crypt injury, moderately active, with rectum sparing (see   part F below); consistent with SCAD   (segmental colitisassociated diverticulosis); negative for dysplasia     F. Rectum, biopsy:   Rectal mucosa with no pathologic abnormalities     EGD 4/15/19 Normal, normal biopsies of esophagus, stomach and duodenum  3/25/19 colonoscopy for rectal bleeding: left sided diverticular with edema, erythema, loss of vascular pattern noted in 2 segments (40-36 cm and 30-22 cm from the anus.    CT ab/pelvis  3/5/19:  Renal hypodensities stable from 4/16/19 exam, 2 mm calculus in right kidney, no gastric or small bowel abnormality  Fecal calprotectin: pending  C diff: negative per OSH records      ASSESSMENT/PLAN  Mrs. Salter is a 44 year old woman with history of cholecystectomy, 3 uncomplicated vaginal deliveries, thyroid cysts (per patient), and recent diagnosis of segmental colitis associated with diverticular disease who is follow up for management of her disease.     Patient with clinical and endoscopic features consistent with SCAD.  In the past, this has been difficult to control, with her most recent flare necessitating prednisone, antibiotics and mesalamine therapy.  She is now on Lialda 4.8 g daily and daily Cortenemas (x 2 months) and is doing well, without any blood in the stool.  I do think it is important to wean off the steroid enemas and try to replace them with Rowasa enemas instead.      Plan:  ---Try to gradually wean off the cortenema and start the Rowasa enema instead.  ---If flare recurs, will plan to retry cipro and flagyl. If these are ineffective, will add prednisone. If Velma requires more than 2 courses of prednisone per year, we will consider starting a steroid-sparing biologic therapy (preferably vedolizumab aka Entyvio), pending insurance approval    Return to clinic in March, when I return from [facility] leave.      I spent a total of 30 minutes, face to face, was spent with this patient, >50% of which was counseling regarding the above delineated issues.      HPI:   Mrs. Salter is a 44 year old woman with history of cholecystectomy, 3 uncomplicated vaginal deliveries, thyroid cysts (per patient), and recent diagnosis of segmental colitis associated with diverticular disease who is establishing care for management of her disease.      Per chart:  Patient seen about 2014 at [facility] for diverticulitis and last seen 5/2019 for follow up of SCAD and nausea.  Patient had subtle symptoms of Patient  "had colonoscopy 3/2019 with finding of edema, erythema, loss of and loss of vascular patter in 2 segments of left colon with mild to moderate diverticulosis.  Sigmoid biopsies showed mildly active chronic colitis consistent with SCAD.  Patient was treated with 10 day course of cipro/flagyl with improvement in symptoms followed by Lialda 4.8 G daily with possible improvement. On 4/11/19 the patient was found to be c.diff negative.      At time of consultation, patient reported frustation with lack of symptom improvement and communication at prior GI providers office.  Here to establish care.  Patient reports 5 years of alternating diarrhea and constipation along with occassional undigested food in stool and borrygymi.  She reports had initial improvement in symptoms following cipro/flagyl and mesalamine but called in as still was having blood streaked stools for which she was started on rowasa enemas.  She is concerned she is still flaring despite the therapies and putting herself on a low residue \"simple carbohydrate diet\" supplemented with \"high protein predigested cancer shakes\".  She reports weight loss in the setting of biking 12 miles daily and changed diet.      At time of consultation the patient reports two semiformed stools daily with blood streaks in all stools (10% blood) and with wiping.  She notes one day a week she will have bloating, increased flatus, general malaise, 5-6 watery BM's along with no associated abdominal pain.      Patient also reports 10 years of chronic food getting stuck in her esophagus until drinks some water with the food.  It doesn't happen with draper, but rather typically with meats.  She has never had a food impaction.  Happens with every meal.  No odynophagia, no nausea currently, no vomiting, rare reflux/heartburn. PPI not helpful in the past.  Reports was having nausea but improved with simple carbohydrate diet.  Reports when was nauseated, it was typically in the morning " and improved with a protein breakfast sandwich.    Prior report of dysphagia and nausea,  Had upper endoscopy 2019, 1 gastric polyp (fundic gland).  Separate mid/distal esophageal, gastric and duodenal biopsies negative.      Patient notes Hx of thyroid cysts and borderline TSH levels.       Social hx   -no smoking  -1-2 x month nsaids  -rare alcohol   -no illicit  -Bike 12 miles a day, weights    Fam Hx  Mother with history colitis - no issues in years, colon polyps  Maternal grandfather  of colon cancer at age 76  Both parents have diverticulitis    Interval history, 2019  In August, started cipro x 2 weeks, flagyl x 2 weeks, lialda and prednisone. Notices increased pain when delays Lialda by a few hours (increased pain). Now down to prednisone 10 mg daily, going to 5 mg daily on Friday. Feels much improved. Blood has stopped; left sided pain has improved. Gained 5 lb since August. Continues on SCD; met with Rebekah Browne.    No upcoming travel plans.     Interval history, 2019  Continues on SCD, which has helped a lot.     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   Off prednisone x 3 weeks. Continues on Cortenema daily x 2 months.   Also on Lialda 4.8 g daily.     Having 2 BMs per day (non-bloody, well-formed). No blood in stool since August.   Gained a few lb since Sep.     ROS:    No fevers or chills  No weight loss  No blurry vision, double vision or change in vision  No sore throat  No lymphadenopathy  No headache, paraesthesias, or weakness in a limb  No shortness of breath or wheezing  No chest pain or pressure  No arthralgias or myalgias  No rashes or skin changes  Yes dysphagia  Yes BRBPR, hematochezia  No dysuria, frequency or urgency  No hot/cold intolerance or polyria  No anxiety or depression    Extra intestinal manifestations of IBD:  No uveitis/episcleritis  No aphthous ulcers   No arthritis   No erythema nodosum/pyoderma gangrenosum.     PERTINENT PAST MEDICAL  HISTORY:  None     PREVIOUS SURGERIES:  Past Surgical History:   Procedure Laterality Date     CHOLECYSTECTOMY       COLONOSCOPY       COLONOSCOPY N/A 8/12/2019    Procedure: COLONOSCOPY, WITH POLYPECTOMY AND BIOPSY;  Surgeon: Yolanda Molina MD;  Location: [facility] OR     Surgical hx  -Cholecystectomy 2010  -3 children - vaginal x 3, Bladder sling placed 2016    PREVIOUS ENDOSCOPY:  See above    ALLERGIES:     Allergies   Allergen Reactions     Sulfa Drugs Hives     Other reaction(s): *Unknown       PERTINENT MEDICATIONS:    Current Outpatient Medications:      albuterol (PROAIR HFA/PROVENTIL HFA/VENTOLIN HFA) 108 (90 Base) MCG/ACT inhaler, , Disp: , Rfl:      amitriptyline (ELAVIL) 10 MG tablet, Take 10 mg by mouth, Disp: , Rfl:      butalbital-acetaminophen-caffeine (FIORICET/ESGIC) -40 MG tablet, Take 1 tablet by mouth, Disp: , Rfl:      ciprofloxacin (CIPRO) 500 MG tablet, Take 1 tablet (500 mg) by mouth 2 times daily, Disp: 28 tablet, Rfl: 2     hydrocortisone (CORTENEMA) 100 MG/60ML enema, Place 1 enema rectally At Bedtime, Disp: 60 mL, Rfl: 3     mesalamine (LIALDA) 1.2 g EC tablet, TK 4 TS PO QD WITH A MEAL, Disp: 360 tablet, Rfl: 1     mesalamine (ROWASA) 4 g enema, INSERT 1 ENEMA RECTALLY QHS, Disp: , Rfl: 3     predniSONE (DELTASONE) 5 MG tablet, Take 40 mg po for one week, then decrease by one tablet a week until off., Disp: 320 tablet, Rfl: 1    SOCIAL HISTORY:  Social History     Socioeconomic History     Marital status:      Spouse name: Not on file     Number of children: Not on file     Years of education: Not on file     Highest education level: Not on file   Occupational History     Not on file   Social Needs     Financial resource strain: Not on file     Food insecurity:     Worry: Not on file     Inability: Not on file     Transportation needs:     Medical: Not on file     Non-medical: Not on file   Tobacco Use     Smoking status: Never Smoker     Smokeless tobacco: Never Used  "  Substance and Sexual Activity     Alcohol use: Yes     Comment: occasional     Drug use: Never     Sexual activity: Not on file   Lifestyle     Physical activity:     Days per week: Not on file     Minutes per session: Not on file     Stress: Not on file   Relationships     Social connections:     Talks on phone: Not on file     Gets together: Not on file     Attends Sikh service: Not on file     Active member of club or organization: Not on file     Attends meetings of clubs or organizations: Not on file     Relationship status: Not on file     Intimate partner violence:     Fear of current or ex partner: Not on file     Emotionally abused: Not on file     Physically abused: Not on file     Forced sexual activity: Not on file   Other Topics Concern     Parent/sibling w/ CABG, MI or angioplasty before 65F 55M? Not Asked   Social History Narrative     Not on file     FAMILY HISTORY:  Family History   Problem Relation Age of Onset     Inflammatory Bowel Disease No family hx of      Colon Cancer No family hx of        PHYSICAL EXAMINATION:  Constitutional: aaox3, cooperative, pleasant, not dyspneic/diaphoretic, no acute distress  Vitals reviewed: /68   Pulse 67   Temp 97.7  F (36.5  C) (Oral)   Ht 1.702 m (5' 7\")   SpO2 100%   BMI 21.25 kg/m     Wt:   Wt Readings from Last 2 Encounters:   09/18/19 61.6 kg (135 lb 11.2 oz)   08/12/19 59 kg (130 lb)      Eyes: Sclera anicteric/injected  Ears/nose/mouth/throat: Normal oropharynx without ulcers or exudate, mucus membranes moist, hearing intact  Neck: supple  CV: No edema  Respiratory: Unlabored breathing  Lymph: No axillary, submandibular, supraclavicular or inguinal lymphadenopathy  Abd: Soft, Nondistended, +bs, no hepatosplenomegaly, nontender, no peritoneal signs  Skin: warm, perfused, no jaundice  Psych: Normal affect  MSK: Normal gait    PERTINENT STUDIES:  Most recent hepatic panel:  Recent Labs   Lab Test 07/22/19 06/27/19   ALT 16 14   AST 19 15 "     Most recent creatinine:  Recent Labs   Lab Test 07/22/19 06/27/19   CR 0.72 0.80       Again, thank you for allowing me to participate in the care of your patient.      Sincerely,    Yolanda Molina MD

## 2019-11-20 NOTE — NURSING NOTE
"Chief Complaint   Patient presents with     RECHECK     follow up IBD       Vitals:    11/20/19 1040   BP: 114/68   Pulse: 67   Temp: 97.7  F (36.5  C)   TempSrc: Oral   SpO2: 100%   Height: 1.702 m (5' 7\")       Body mass index is 21.25 kg/m .    Mari Lerma CMA    "

## 2019-11-20 NOTE — PROGRESS NOTES
IBD CLINIC VISIT -- follow up    CHIEF COMPLAINT: SCAD evaluation and management    SCAD HISTORY  Age at diagnosis: 44  Extent of disease: SCAD, left sided rui-diverticular inflammation  Current SCAD medications: Lialda 4.8 G daily, prednisone 10 mg daily --> 5 mg on Friday    Prior SCAD Medications: Cipro/flagyl (helped)    DISEASE ASSESSMENT  Labs:  Recent Labs   Lab Test 06/19/19  1009   CRP <2.9   SED 9     Endoscopic assessment:     8/2019 icscope showed Dee 2 colitis from 30 cm-40cm. Diverticulosis in the sigmoid. Rest of colon was normal.     PATH:  SPECIMEN(S):   A: Cecal biopsy   B: Colon biopsy, ascending   C: Colon biopsy, transverse   D: Colon biopsy, descending   E: Sigmoid colon biopsy   F: Rectal biopsy     FINAL DIAGNOSIS:   A. Cecum, biopsy:   Colonic mucosa with no pathologic abnormalities     B. Ascending Colon, Biopsy:   Colonic mucosa with no pathologic abnormalities     C. Transverse Colon, biopsy:   Colonic mucosa with no pathologic abnormalities     D. Descending Colon, biopsy:   Colonic mucosa with no pathologic abnormalities     E. Sigmoid Colon, biopsy:   Colitis with crypt injury, moderately active, with rectum sparing (see   part F below); consistent with SCAD   (segmental colitisassociated diverticulosis); negative for dysplasia     F. Rectum, biopsy:   Rectal mucosa with no pathologic abnormalities     EGD 4/15/19 Normal, normal biopsies of esophagus, stomach and duodenum  3/25/19 colonoscopy for rectal bleeding: left sided diverticular with edema, erythema, loss of vascular pattern noted in 2 segments (40-36 cm and 30-22 cm from the anus.    CT ab/pelvis 3/5/19:  Renal hypodensities stable from 4/16/19 exam, 2 mm calculus in right kidney, no gastric or small bowel abnormality  Fecal calprotectin: pending  C diff: negative per OSH records      ASSESSMENT/PLAN  Mrs. Salter is a 44 year old woman with history of cholecystectomy, 3 uncomplicated vaginal deliveries, thyroid cysts (per  patient), and recent diagnosis of segmental colitis associated with diverticular disease who is follow up for management of her disease.     Patient with clinical and endoscopic features consistent with SCAD.  In the past, this has been difficult to control, with her most recent flare necessitating prednisone, antibiotics and mesalamine therapy.  She is now on Lialda 4.8 g daily and daily Cortenemas (x 2 months) and is doing well, without any blood in the stool.  I do think it is important to wean off the steroid enemas and try to replace them with Rowasa enemas instead.      Plan:  ---Try to gradually wean off the cortenema and start the Rowasa enema instead.  ---If flare recurs, will plan to retry cipro and flagyl. If these are ineffective, will add prednisone. If Velma requires more than 2 courses of prednisone per year, we will consider starting a steroid-sparing biologic therapy (preferably vedolizumab aka Entyvio), pending insurance approval    Return to clinic in March, when I return from [facility] leave.      I spent a total of 30 minutes, face to face, was spent with this patient, >50% of which was counseling regarding the above delineated issues.      HPI:   Mrs. Salter is a 44 year old woman with history of cholecystectomy, 3 uncomplicated vaginal deliveries, thyroid cysts (per patient), and recent diagnosis of segmental colitis associated with diverticular disease who is establishing care for management of her disease.      Per chart:  Patient seen about 2014 at [facility] for diverticulitis and last seen 5/2019 for follow up of SCAD and nausea.  Patient had subtle symptoms of Patient had colonoscopy 3/2019 with finding of edema, erythema, loss of and loss of vascular patter in 2 segments of left colon with mild to moderate diverticulosis.  Sigmoid biopsies showed mildly active chronic colitis consistent with SCAD.  Patient was treated with 10 day course of cipro/flagyl with improvement in symptoms followed by  "Lialda 4.8 G daily with possible improvement. On 4/11/19 the patient was found to be c.diff negative.      At time of consultation, patient reported frustation with lack of symptom improvement and communication at prior GI providers office.  Here to establish care.  Patient reports 5 years of alternating diarrhea and constipation along with occassional undigested food in stool and borrygymi.  She reports had initial improvement in symptoms following cipro/flagyl and mesalamine but called in as still was having blood streaked stools for which she was started on rowasa enemas.  She is concerned she is still flaring despite the therapies and putting herself on a low residue \"simple carbohydrate diet\" supplemented with \"high protein predigested cancer shakes\".  She reports weight loss in the setting of biking 12 miles daily and changed diet.      At time of consultation the patient reports two semiformed stools daily with blood streaks in all stools (10% blood) and with wiping.  She notes one day a week she will have bloating, increased flatus, general malaise, 5-6 watery BM's along with no associated abdominal pain.      Patient also reports 10 years of chronic food getting stuck in her esophagus until drinks some water with the food.  It doesn't happen with draper, but rather typically with meats.  She has never had a food impaction.  Happens with every meal.  No odynophagia, no nausea currently, no vomiting, rare reflux/heartburn. PPI not helpful in the past.  Reports was having nausea but improved with simple carbohydrate diet.  Reports when was nauseated, it was typically in the morning and improved with a protein breakfast sandwich.    Prior report of dysphagia and nausea,  Had upper endoscopy April 15th 2019, 1 gastric polyp (fundic gland).  Separate mid/distal esophageal, gastric and duodenal biopsies negative.      Patient notes Hx of thyroid cysts and borderline TSH levels.       Social hx   -no smoking  -1-2 " x month nsaids  -rare alcohol   -no illicit  -Bike 12 miles a day, weights    Fam Hx  Mother with history colitis - no issues in years, colon polyps  Maternal grandfather  of colon cancer at age 76  Both parents have diverticulitis    Interval history, 2019  In August, started cipro x 2 weeks, flagyl x 2 weeks, lialda and prednisone. Notices increased pain when delays Lialda by a few hours (increased pain). Now down to prednisone 10 mg daily, going to 5 mg daily on Friday. Feels much improved. Blood has stopped; left sided pain has improved. Gained 5 lb since August. Continues on SCD; met with Rebekah Browne.    No upcoming travel plans.     Interval history, 2019  Continues on SCD, which has helped a lot.     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   Off prednisone x 3 weeks. Continues on Cortenema daily x 2 months.   Also on Lialda 4.8 g daily.     Having 2 BMs per day (non-bloody, well-formed). No blood in stool since August.   Gained a few lb since Sep.     ROS:    No fevers or chills  No weight loss  No blurry vision, double vision or change in vision  No sore throat  No lymphadenopathy  No headache, paraesthesias, or weakness in a limb  No shortness of breath or wheezing  No chest pain or pressure  No arthralgias or myalgias  No rashes or skin changes  Yes dysphagia  Yes BRBPR, hematochezia  No dysuria, frequency or urgency  No hot/cold intolerance or polyria  No anxiety or depression    Extra intestinal manifestations of IBD:  No uveitis/episcleritis  No aphthous ulcers   No arthritis   No erythema nodosum/pyoderma gangrenosum.     PERTINENT PAST MEDICAL HISTORY:  None     PREVIOUS SURGERIES:  Past Surgical History:   Procedure Laterality Date     CHOLECYSTECTOMY       COLONOSCOPY       COLONOSCOPY N/A 2019    Procedure: COLONOSCOPY, WITH POLYPECTOMY AND BIOPSY;  Surgeon: Yolanda Molina MD;  Location: [facility] OR     Surgical hx  -Cholecystectomy   -3 children - vaginal x 3,  Bladder sling placed 2016    PREVIOUS ENDOSCOPY:  See above    ALLERGIES:     Allergies   Allergen Reactions     Sulfa Drugs Hives     Other reaction(s): *Unknown       PERTINENT MEDICATIONS:    Current Outpatient Medications:      albuterol (PROAIR HFA/PROVENTIL HFA/VENTOLIN HFA) 108 (90 Base) MCG/ACT inhaler, , Disp: , Rfl:      amitriptyline (ELAVIL) 10 MG tablet, Take 10 mg by mouth, Disp: , Rfl:      butalbital-acetaminophen-caffeine (FIORICET/ESGIC) -40 MG tablet, Take 1 tablet by mouth, Disp: , Rfl:      ciprofloxacin (CIPRO) 500 MG tablet, Take 1 tablet (500 mg) by mouth 2 times daily, Disp: 28 tablet, Rfl: 2     hydrocortisone (CORTENEMA) 100 MG/60ML enema, Place 1 enema rectally At Bedtime, Disp: 60 mL, Rfl: 3     mesalamine (LIALDA) 1.2 g EC tablet, TK 4 TS PO QD WITH A MEAL, Disp: 360 tablet, Rfl: 1     mesalamine (ROWASA) 4 g enema, INSERT 1 ENEMA RECTALLY QHS, Disp: , Rfl: 3     predniSONE (DELTASONE) 5 MG tablet, Take 40 mg po for one week, then decrease by one tablet a week until off., Disp: 320 tablet, Rfl: 1    SOCIAL HISTORY:  Social History     Socioeconomic History     Marital status:      Spouse name: Not on file     Number of children: Not on file     Years of education: Not on file     Highest education level: Not on file   Occupational History     Not on file   Social Needs     Financial resource strain: Not on file     Food insecurity:     Worry: Not on file     Inability: Not on file     Transportation needs:     Medical: Not on file     Non-medical: Not on file   Tobacco Use     Smoking status: Never Smoker     Smokeless tobacco: Never Used   Substance and Sexual Activity     Alcohol use: Yes     Comment: occasional     Drug use: Never     Sexual activity: Not on file   Lifestyle     Physical activity:     Days per week: Not on file     Minutes per session: Not on file     Stress: Not on file   Relationships     Social connections:     Talks on phone: Not on file     Gets  "together: Not on file     Attends Jainism service: Not on file     Active member of club or organization: Not on file     Attends meetings of clubs or organizations: Not on file     Relationship status: Not on file     Intimate partner violence:     Fear of current or ex partner: Not on file     Emotionally abused: Not on file     Physically abused: Not on file     Forced sexual activity: Not on file   Other Topics Concern     Parent/sibling w/ CABG, MI or angioplasty before 65F 55M? Not Asked   Social History Narrative     Not on file     FAMILY HISTORY:  Family History   Problem Relation Age of Onset     Inflammatory Bowel Disease No family hx of      Colon Cancer No family hx of        PHYSICAL EXAMINATION:  Constitutional: aaox3, cooperative, pleasant, not dyspneic/diaphoretic, no acute distress  Vitals reviewed: /68   Pulse 67   Temp 97.7  F (36.5  C) (Oral)   Ht 1.702 m (5' 7\")   SpO2 100%   BMI 21.25 kg/m    Wt:   Wt Readings from Last 2 Encounters:   09/18/19 61.6 kg (135 lb 11.2 oz)   08/12/19 59 kg (130 lb)      Eyes: Sclera anicteric/injected  Ears/nose/mouth/throat: Normal oropharynx without ulcers or exudate, mucus membranes moist, hearing intact  Neck: supple  CV: No edema  Respiratory: Unlabored breathing  Lymph: No axillary, submandibular, supraclavicular or inguinal lymphadenopathy  Abd: Soft, Nondistended, +bs, no hepatosplenomegaly, nontender, no peritoneal signs  Skin: warm, perfused, no jaundice  Psych: Normal affect  MSK: Normal gait    PERTINENT STUDIES:  Most recent hepatic panel:  Recent Labs   Lab Test 07/22/19 06/27/19   ALT 16 14   AST 19 15     Most recent creatinine:  Recent Labs   Lab Test 07/22/19 06/27/19   CR 0.72 0.80     Answers for HPI/ROS submitted by the patient on 11/19/2019   General Symptoms: Yes  Skin Symptoms: No  HENT Symptoms: No  EYE SYMPTOMS: No  HEART SYMPTOMS: No  LUNG SYMPTOMS: No  INTESTINAL SYMPTOMS: Yes  URINARY SYMPTOMS: No  GYNECOLOGIC SYMPTOMS: " No  BREAST SYMPTOMS: No  SKELETAL SYMPTOMS: No  BLOOD SYMPTOMS: No  NERVOUS SYSTEM SYMPTOMS: No  MENTAL HEALTH SYMPTOMS: No  Fever: No  Loss of appetite: No  Weight loss: No  Weight gain: Yes  Fatigue: Yes  Chills: Yes  Increased stress: Yes  Feeling hot or cold when others believe the temperature is normal: Yes  Loss of height: No  Post-operative complications: No  Surgical site pain: No  Hallucinations: No  Change in or Loss of Energy: No  Hyperactivity: No  Confusion: No  Heart burn or indigestion: Yes  Nausea: Yes  Vomiting: No  Abdominal pain: Yes  Bloating: No  Constipation: No  Diarrhea: Yes  Blood in stool: No  Black stools: No  Rectal or Anal pain: No  Fecal incontinence: No  Yellowing of skin or eyes: No  Vomit with blood: No  Change in stools: No

## 2020-01-13 DIAGNOSIS — K50.10 SEGMENTAL COLITIS ASSOCIATED WITH DIVERTICULOSIS (H): ICD-10-CM

## 2020-01-13 DIAGNOSIS — K57.30 SEGMENTAL COLITIS ASSOCIATED WITH DIVERTICULOSIS (H): ICD-10-CM

## 2020-01-14 NOTE — TELEPHONE ENCOUNTER
hydrocortisone (CORTENEMA) 100 MG/60ML enema      Last Written Prescription Date:  9/17/19  Last Fill Quantity: 60 ml,   # refills: 3  Last Office Visit : 11/20/19  Future Office visit:  none    Routing refill request to provider for review/approval because:  Medication not on the GI refill protocol.

## 2020-01-16 RX ORDER — HYDROCORTISONE 100 MG/60ML
100 SUSPENSION RECTAL AT BEDTIME
Qty: 60 ML | Refills: 5 | Status: SHIPPED | OUTPATIENT
Start: 2020-01-16 | End: 2020-12-09

## 2020-04-13 ENCOUNTER — VIRTUAL VISIT (OUTPATIENT)
Dept: GASTROENTEROLOGY | Facility: CLINIC | Age: 45
End: 2020-04-13
Payer: COMMERCIAL

## 2020-04-13 ENCOUNTER — PATIENT OUTREACH (OUTPATIENT)
Dept: GASTROENTEROLOGY | Facility: CLINIC | Age: 45
End: 2020-04-13

## 2020-04-13 DIAGNOSIS — K52.9 COLITIS: ICD-10-CM

## 2020-04-13 DIAGNOSIS — E71.312 SCAD (SHORT-CHAIN ACYL-COA DEHYDROGENASE DEFICIENCY) (H): Primary | ICD-10-CM

## 2020-04-13 RX ORDER — METRONIDAZOLE 500 MG/1
500 TABLET ORAL 2 TIMES DAILY
Qty: 60 TABLET | Refills: 0 | Status: SHIPPED | OUTPATIENT
Start: 2020-04-13 | End: 2020-05-13

## 2020-04-13 RX ORDER — CIPROFLOXACIN 500 MG/1
500 TABLET, FILM COATED ORAL 2 TIMES DAILY
Qty: 60 TABLET | Refills: 0 | Status: SHIPPED | OUTPATIENT
Start: 2020-04-13 | End: 2020-05-13

## 2020-04-13 ASSESSMENT — PAIN SCALES - GENERAL: PAINLEVEL: MILD PAIN (3)

## 2020-04-13 NOTE — PROGRESS NOTES
"Velma Mcfarlane is a 45 year old female who is being evaluated via a billable video visit.      The patient has been notified of following:     \"This video visit will be conducted via a call between you and your physician/provider. We have found that certain health care needs can be provided without the need for an in-person physical exam.  This service lets us provide the care you need with a video conversation.  If a prescription is necessary we can send it directly to your pharmacy.  If lab work is needed we can place an order for that and you can then stop by our lab to have the test done at a later time.    Video visits are billed at different rates depending on your insurance coverage.  Please reach out to your insurance provider with any questions.    If during the course of the call the physician/provider feels a video visit is not appropriate, you will not be charged for this service.\"    Patient has given verbal consent for Video visit? Yes    How would you like to obtain your AVS? [place]    Patient would like the video invitation sent by: Send to e-mail at: [email or web address]      Video Start Time: 4:46 PM    Additional provider notes: insert own note template here   IBD CLINIC VISIT -- follow up    CHIEF COMPLAINT: SCAD evaluation and management    SCAD HISTORY  Age at diagnosis: 44  Extent of disease: SCAD, left sided rui-diverticular inflammation  Current SCAD medications: Lialda 4.8 G daily, prednisone 10 mg daily --> 5 mg on Friday    Prior SCAD Medications: Cipro/flagyl (helped)    DISEASE ASSESSMENT  Labs:  Recent Labs   Lab Test 06/19/19  1009   CRP <2.9   SED 9     Endoscopic assessment:     8/2019 icscope showed Dee 2 colitis from 30 cm-40cm. Diverticulosis in the sigmoid. Rest of colon was normal.     PATH:  SPECIMEN(S):   A: Cecal biopsy   B: Colon biopsy, ascending   C: Colon biopsy, transverse   D: Colon biopsy, descending   E: Sigmoid colon biopsy   F: Rectal biopsy "     FINAL DIAGNOSIS:   A. Cecum, biopsy:   Colonic mucosa with no pathologic abnormalities     B. Ascending Colon, Biopsy:   Colonic mucosa with no pathologic abnormalities     C. Transverse Colon, biopsy:   Colonic mucosa with no pathologic abnormalities     D. Descending Colon, biopsy:   Colonic mucosa with no pathologic abnormalities     E. Sigmoid Colon, biopsy:   Colitis with crypt injury, moderately active, with rectum sparing (see   part F below); consistent with SCAD   (segmental colitisassociated diverticulosis); negative for dysplasia     F. Rectum, biopsy:   Rectal mucosa with no pathologic abnormalities     EGD 4/15/19 Normal, normal biopsies of esophagus, stomach and duodenum  3/25/19 colonoscopy for rectal bleeding: left sided diverticular with edema, erythema, loss of vascular pattern noted in 2 segments (40-36 cm and 30-22 cm from the anus.    CT ab/pelvis 3/5/19:  Renal hypodensities stable from 4/16/19 exam, 2 mm calculus in right kidney, no gastric or small bowel abnormality  Fecal calprotectin: pending  C diff: negative per OSH records      ASSESSMENT/PLAN  Mrs. Salter is a 44 year old woman with history of cholecystectomy, 3 uncomplicated vaginal deliveries, thyroid cysts (per patient), and recent diagnosis of segmental colitis associated with diverticular disease who is following up for management of her disease.     Patient with clinical and endoscopic features consistent with SCAD. She had been recently initiated on prednisone for an unrelated issue, but is now having difficulty weaning off due to recurrence of GI symptoms.  She also continues on Lialda and Rowasa. In the past, cipro and flagyl have been effective.     Plan:  --Start cipro/flagyl daily and use this as a bridge to wean off prednisone  ----------Transition to EOD dosing of prednisone while on cipro and flagyl daily  --Will provide a letter to work from home, given current and potential future need for prednisone     Return to  "clinic in 3 months      HPI:   Mrs. Salter is a 45 year old woman with history of cholecystectomy, 3 uncomplicated vaginal deliveries, thyroid cysts (per patient), and recent diagnosis of segmental colitis associated with diverticular disease who is establishing care for management of her disease.      Per chart:  Patient seen about [facility] at [facility] for diverticulitis and last seen 5/2019 for follow up of SCAD and nausea.  Patient had subtle symptoms of Patient had colonoscopy 3/2019 with finding of edema, erythema, loss of and loss of vascular patter in 2 segments of left colon with mild to moderate diverticulosis.  Sigmoid biopsies showed mildly active chronic colitis consistent with SCAD.  Patient was treated with 10 day course of cipro/flagyl with improvement in symptoms followed by Lialda 4.8 G daily with possible improvement. On 4/11/19 the patient was found to be c.diff negative.      At time of consultation, patient reported frustation with lack of symptom improvement and communication at prior GI providers office.  Here to establish care.  Patient reports 5 years of alternating diarrhea and constipation along with occassional undigested food in stool and borrygymi.  She reports had initial improvement in symptoms following cipro/flagyl and mesalamine but called in as still was having blood streaked stools for which she was started on rowasa enemas.  She is concerned she is still flaring despite the therapies and putting herself on a low residue \"simple carbohydrate diet\" supplemented with \"high protein predigested cancer shakes\".  She reports weight loss in the setting of biking 12 miles daily and changed diet.      At time of consultation the patient reports two semiformed stools daily with blood streaks in all stools (10% blood) and with wiping.  She notes one day a week she will have bloating, increased flatus, general malaise, 5-6 watery BM's along with no associated abdominal pain.      Patient also " reports 10 years of chronic food getting stuck in her esophagus until drinks some water with the food.  It doesn't happen with draper, but rather typically with meats.  She has never had a food impaction.  Happens with every meal.  No odynophagia, no nausea currently, no vomiting, rare reflux/heartburn. PPI not helpful in the past.  Reports was having nausea but improved with simple carbohydrate diet.  Reports when was nauseated, it was typically in the morning and improved with a protein breakfast sandwich.    Prior report of dysphagia and nausea,  Had upper endoscopy 2019, 1 gastric polyp (fundic gland).  Separate mid/distal esophageal, gastric and duodenal biopsies negative.    Patient notes Hx of thyroid cysts and borderline TSH levels.       Social hx   -no smoking  -1-2 x month nsaids  -rare alcohol   -no illicit  -Bike 12 miles a day, weights    Fam Hx  Mother with history colitis - no issues in years, colon polyps  Maternal grandfather  of colon cancer at age 76  Both parents have diverticulitis    Interval history, 2019  In August, started cipro x 2 weeks, flagyl x 2 weeks, lialda and prednisone. Notices increased pain when delays Lialda by a few hours (increased pain). Now down to prednisone 10 mg daily, going to 5 mg daily on Friday. Feels much improved. Blood has stopped; left sided pain has improved. Gained 5 lb since August. Continues on SCD; met with Rebekah Browne.    No upcoming travel plans.     Interval history, 2019  Continues on SCD, which has helped a lot.     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   Off prednisone x 3 weeks. Continues on Cortenema daily x 2 months.   Also on Lialda 4.8 g daily.     Having 2 BMs per day (non-bloody, well-formed). No blood in stool since August.   Gained a few lb since Sep.     Interval history, 2020 (virtual visit)  Got sick in Dec with wheezing and was dx with walking PNA . Treated with abx and then prednisone x 5 days.    Continues on 5 mg prednisone daily and has difficulty weaning off it due to recurrent abd pain. Has been on prednisone 5 mg x 1 month.     Having 2 BMs per day, non bloody, loose.     For SCAD, currently on Lialda 4.8g and Rowasa enemas.     ROS:    No fevers or chills  No weight loss  No blurry vision, double vision or change in vision  No sore throat  No lymphadenopathy  No headache, paraesthesias, or weakness in a limb  No shortness of breath or wheezing  No chest pain or pressure  No arthralgias or myalgias  No rashes or skin changes  Yes dysphagia  Yes BRBPR, hematochezia  No dysuria, frequency or urgency  No hot/cold intolerance or polyria  No anxiety or depression    Extra intestinal manifestations of IBD:  No uveitis/episcleritis  No aphthous ulcers   No arthritis   No erythema nodosum/pyoderma gangrenosum.     PERTINENT PAST MEDICAL HISTORY:  None     PREVIOUS SURGERIES:  Past Surgical History:   Procedure Laterality Date     CHOLECYSTECTOMY       COLONOSCOPY       COLONOSCOPY N/A 8/12/2019    Procedure: COLONOSCOPY, WITH POLYPECTOMY AND BIOPSY;  Surgeon: Yolanda Molina MD;  Location: [facility] OR     Surgical hx  -Cholecystectomy 2010  -3 children - vaginal x 3, Bladder sling placed 2016    PREVIOUS ENDOSCOPY:  See above    ALLERGIES:     Allergies   Allergen Reactions     Sulfa Drugs Hives     Other reaction(s): *Unknown       PERTINENT MEDICATIONS:    Current Outpatient Medications:      butalbital-acetaminophen-caffeine (FIORICET/ESGIC) -40 MG tablet, Take 1 tablet by mouth, Disp: , Rfl:      mesalamine (ROWASA) 4 g enema, Place 1 enema (4 g) rectally At Bedtime, Disp: 90 enema, Rfl: 3     Multiple Vitamins-Iron (MULTIVITAMIN/IRON PO), Take 1 tablet by mouth, Disp: , Rfl:      predniSONE (DELTASONE) 5 MG tablet, Take 40 mg po for one week, then decrease by one tablet a week until off., Disp: 320 tablet, Rfl: 1     ACIDOPHILUS LACTOBACILLUS PO, Take 1 capsule by mouth, Disp: , Rfl:      albuterol  (PROAIR HFA/PROVENTIL HFA/VENTOLIN HFA) 108 (90 Base) MCG/ACT inhaler, , Disp: , Rfl:      amitriptyline (ELAVIL) 10 MG tablet, Take 10 mg by mouth, Disp: , Rfl:      hydrocortisone (CORTENEMA) 100 MG/60ML enema, Place 1 enema rectally At Bedtime (Patient not taking: Reported on 4/13/2020), Disp: 60 mL, Rfl: 5    SOCIAL HISTORY:  Social History     Socioeconomic History     Marital status:      Spouse name: Not on file     Number of children: Not on file     Years of education: Not on file     Highest education level: Not on file   Occupational History     Not on file   Social Needs     Financial resource strain: Not on file     Food insecurity     Worry: Not on file     Inability: Not on file     Transportation needs     Medical: Not on file     Non-medical: Not on file   Tobacco Use     Smoking status: Never Smoker     Smokeless tobacco: Never Used   Substance and Sexual Activity     Alcohol use: Yes     Comment: occasional     Drug use: Never     Sexual activity: Not on file   Lifestyle     Physical activity     Days per week: Not on file     Minutes per session: Not on file     Stress: Not on file   Relationships     Social connections     Talks on phone: Not on file     Gets together: Not on file     Attends [employer] service: Not on file     Active member of club or organization: Not on file     Attends meetings of clubs or organizations: Not on file     Relationship status: Not on file     Intimate partner violence     Fear of current or ex partner: Not on file     Emotionally abused: Not on file     Physically abused: Not on file     Forced sexual activity: Not on file   Other Topics Concern     Parent/sibling w/ CABG, MI or angioplasty before 65F 55M? Not Asked   Social History Narrative     Not on file     FAMILY HISTORY:  Family History   Problem Relation Age of Onset     Inflammatory Bowel Disease No family hx of      Colon Cancer No family hx of        PHYSICAL EXAMINATION:  Constitutional:  [id number], cooperative, pleasant, not dyspneic/diaphoretic, no acute distress  Vitals reviewed: There were no vitals taken for this visit.  Wt:   Wt Readings from Last 2 Encounters:   09/18/19 61.6 kg (135 lb 11.2 oz)   08/12/19 59 kg (130 lb)      General appearance  Healthy appearing adult, in no acute distress     Eyes  Sclera anicteric  Pupils round and reactive to light     Ears, nose, mouth and throat  No obvious external lesions of ears and nose  Hearing intact     Neck  Symmetric  No obvious external lesions     Respiratory  Normal respiration, no use of accessory muscles      MSK  Gait normal     Skin  No rashes or jaundice      Psychiatric  Oriented to person, place and time  Appropriate mood and affect.     PERTINENT STUDIES:  Most recent hepatic panel:  Recent Labs   Lab Test 07/22/19 06/27/19   ALT 16 14   AST 19 15     Most recent creatinine:  Recent Labs   Lab Test 07/22/19 06/27/19   CR 0.72 0.80       Video-Visit Details    Type of service:  Video Visit    Video End Time (time video stopped): 5:06    Originating Location (pt. Location): Home    Distant Location (provider location):  [facility]     Mode of Communication:  Video Conference via [employer]      Yolanda Molina MD

## 2020-04-13 NOTE — PROGRESS NOTES
Contacted pt as pt asked via my chart for a call.  Pt last seen in November. Pt diagnosed with pneumonia. Given low dose of prednisone and sounds like a Z pérez. Pt questioning if she is flaring. Due to Covid 19 given video visit appt today with Dr. Molina.

## 2020-04-13 NOTE — LETTER
April 13, 2020       TO: Velma Salter [place]  [address] [facility] [id number]     To Whom It May Concern:    To whom it may concern.      The person listed on this letter is considered to be high risk of complications from the COVID -19 virus.  Please take all precautions to limit exposure for this person, and please excuse this person from work related activities that require person-to-person interactions.        Sincerely,      Yolanda Molina MD  [facility]   Division of Gastroenterology, Hepatology, and Nutrition

## 2020-04-13 NOTE — NURSING NOTE
Chief Complaint   Patient presents with     RECHECK     F/U for IBD       There were no vitals filed for this visit.    There is no height or weight on file to calculate BMI.      Stacia Walton LPN

## 2020-04-13 NOTE — PATIENT INSTRUCTIONS
PLAN  --Start cipro/flagyl daily and use this as a bridge to wean off prednisone  ----------Transition to EOD dosing of prednisone while on cipro and flagyl daily  --Will provide a letter to work from home, given current and potential future need for prednisone

## 2020-06-16 DIAGNOSIS — E71.312 SCAD (SHORT-CHAIN ACYL-COA DEHYDROGENASE DEFICIENCY) (H): ICD-10-CM

## 2020-06-17 NOTE — TELEPHONE ENCOUNTER
budesonide (UCERIS) 9 MG 24 hr tablet  Last Written Prescription Date:  5/7/20  Last Fill Quantity: 37,   # refills: 0  Last Office Visit :4/13/20  Future Office visit: none    Routing refill request to provider for review/approval because: per last order has stop after taper. Now requested. rf?

## 2020-06-18 RX ORDER — BUDESONIDE 9 MG/1
9 TABLET, FILM COATED, EXTENDED RELEASE ORAL EVERY MORNING
Qty: 37 TABLET | Refills: 0 | Status: SHIPPED | OUTPATIENT
Start: 2020-06-18 | End: 2023-12-15

## 2020-11-16 ENCOUNTER — HEALTH MAINTENANCE LETTER (OUTPATIENT)
Age: 45
End: 2020-11-16

## 2020-12-30 ENCOUNTER — VIRTUAL VISIT (OUTPATIENT)
Dept: GASTROENTEROLOGY | Facility: CLINIC | Age: 45
End: 2020-12-30
Payer: COMMERCIAL

## 2020-12-30 VITALS — WEIGHT: 135 LBS | HEIGHT: 67 IN | BODY MASS INDEX: 21.19 KG/M2

## 2020-12-30 DIAGNOSIS — R10.13 EPIGASTRIC PAIN: Primary | ICD-10-CM

## 2020-12-30 DIAGNOSIS — K57.30 SEGMENTAL COLITIS ASSOCIATED WITH DIVERTICULOSIS (H): ICD-10-CM

## 2020-12-30 DIAGNOSIS — K50.10 SEGMENTAL COLITIS ASSOCIATED WITH DIVERTICULOSIS (H): ICD-10-CM

## 2020-12-30 PROCEDURE — 99214 OFFICE O/P EST MOD 30 MIN: CPT | Mod: 95 | Performed by: INTERNAL MEDICINE

## 2020-12-30 RX ORDER — OMEPRAZOLE 40 MG/1
40 CAPSULE, DELAYED RELEASE ORAL DAILY
Qty: 90 CAPSULE | Refills: 1 | Status: SHIPPED | OUTPATIENT
Start: 2020-12-30 | End: 2024-01-17

## 2020-12-30 RX ORDER — HYDROCORTISONE 100 MG/60ML
100 SUSPENSION RECTAL AT BEDTIME
Qty: 60 ML | Refills: 5 | Status: SHIPPED | OUTPATIENT
Start: 2020-12-30 | End: 2021-01-07

## 2020-12-30 RX ORDER — MESALAMINE 1.2 G/1
TABLET, DELAYED RELEASE ORAL
COMMUNITY
Start: 2020-10-14 | End: 2021-01-14

## 2020-12-30 ASSESSMENT — MIFFLIN-ST. JEOR: SCORE: 1289.99

## 2020-12-30 ASSESSMENT — PAIN SCALES - GENERAL: PAINLEVEL: MILD PAIN (2)

## 2020-12-30 NOTE — LETTER
"    12/30/2020         RE: Velma Mcfarlane  [address] [facility] [id number]        Dear Colleague,    Thank you for referring your patient, Velma Mcfralane, to the [facility] [place]. Please see a copy of my visit note below.    Velma Mcfarlane is a 45 year old female who is being evaluated via a billable video visit.      The patient has been notified of following:     \"This video visit will be conducted via a call between you and your physician/provider. We have found that certain health care needs can be provided without the need for an in-person physical exam.  This service lets us provide the care you need with a video conversation.  If a prescription is necessary we can send it directly to your pharmacy.  If lab work is needed we can place an order for that and you can then stop by our lab to have the test done at a later time.    Video visits are billed at different rates depending on your insurance coverage.  Please reach out to your insurance provider with any questions.    If during the course of the call the physician/provider feels a video visit is not appropriate, you will not be charged for this service.\"    Patient has given verbal consent for Video visit? Yes  How would you like to obtain your AVS? MyChart  If you are dropped from the video visit, the video invite should be resent to: Text to cell phone: [phone]  Will anyone else be joining your video visit? No      IBD CLINIC VISIT -- follow up    CHIEF COMPLAINT: SCAD evaluation and management    SCAD HISTORY  Age at diagnosis: 44  Extent of disease: SCAD, left sided rui-diverticular inflammation  Current SCAD medications: Lialda 4.8 G daily, prednisone 10 mg daily --> 5 mg on Friday    Prior SCAD Medications: Cipro/flagyl (helped)    DISEASE ASSESSMENT  Labs:  Recent Labs   Lab Test 06/19/19  1009   CRP <2.9   SED 9     Endoscopic assessment:     8/2019 icscope " showed Dee 2 colitis from 30 cm-40cm. Diverticulosis in the sigmoid. Rest of colon was normal.     PATH:  SPECIMEN(S):   A: Cecal biopsy   B: Colon biopsy, ascending   C: Colon biopsy, transverse   D: Colon biopsy, descending   E: Sigmoid colon biopsy   F: Rectal biopsy     FINAL DIAGNOSIS:   A. Cecum, biopsy:   Colonic mucosa with no pathologic abnormalities     B. Ascending Colon, Biopsy:   Colonic mucosa with no pathologic abnormalities     C. Transverse Colon, biopsy:   Colonic mucosa with no pathologic abnormalities     D. Descending Colon, biopsy:   Colonic mucosa with no pathologic abnormalities     E. Sigmoid Colon, biopsy:   Colitis with crypt injury, moderately active, with rectum sparing (see   part F below); consistent with SCAD   (segmental colitisassociated diverticulosis); negative for dysplasia     F. Rectum, biopsy:   Rectal mucosa with no pathologic abnormalities     EGD 4/15/19 Normal, normal biopsies of esophagus, stomach and duodenum  3/25/19 colonoscopy for rectal bleeding: left sided diverticular with edema, erythema, loss of vascular pattern noted in 2 segments (40-36 cm and 30-22 cm from the anus.    CT ab/pelvis 3/5/19:  Renal hypodensities stable from 4/16/19 exam, 2 mm calculus in right kidney, no gastric or small bowel abnormality  Fecal calprotectin: pending  C diff: negative per OSH records      ASSESSMENT/PLAN  Mrs. Salter is a 45 year old woman with history of cholecystectomy, 3 uncomplicated vaginal deliveries, thyroid cysts (per patient), and segmental colitis associated with diverticular disease who is following up for management of her disease in the setting of a flare and epigastric, postprandial discomfort.    We will proceed with the following plan:       Plan:  ---Omeprazole for epigastric discomfort for possible GERD/dyspepsia   ---Cortenemas ordered, since these were particularly effective in the past.  ---Continue Lialda 4.8 g daily    Return to clinic in 3  "months      HPI:   Mrs. Salter is a 45 year old woman with history of cholecystectomy, 3 uncomplicated vaginal deliveries, thyroid cysts (per patient), and recent diagnosis of segmental colitis associated with diverticular disease who is establishing care for management of her disease.      Per chart:  Patient seen about 2014 at [facility] for diverticulitis and last seen 5/2019 for follow up of SCAD and nausea.  Patient had subtle symptoms of Patient had colonoscopy 3/2019 with finding of edema, erythema, loss of and loss of vascular patter in 2 segments of left colon with mild to moderate diverticulosis.  Sigmoid biopsies showed mildly active chronic colitis consistent with SCAD.  Patient was treated with 10 day course of cipro/flagyl with improvement in symptoms followed by Lialda 4.8 G daily with possible improvement. On 4/11/19 the patient was found to be c.diff negative.      At time of consultation, patient reported frustation with lack of symptom improvement and communication at prior GI providers office.  Here to establish care.  Patient reports 5 years of alternating diarrhea and constipation along with occassional undigested food in stool and borrygymi.  She reports had initial improvement in symptoms following cipro/flagyl and mesalamine but called in as still was having blood streaked stools for which she was started on rowasa enemas.  She is concerned she is still flaring despite the therapies and putting herself on a low residue \"simple carbohydrate diet\" supplemented with \"high protein predigested cancer shakes\".  She reports weight loss in the setting of biking 12 miles daily and changed diet.      At time of consultation the patient reports two semiformed stools daily with blood streaks in all stools (10% blood) and with wiping.  She notes one day a week she will have bloating, increased flatus, general malaise, 5-6 watery BM's along with no associated abdominal pain.      Patient also reports 10 years " of chronic food getting stuck in her esophagus until drinks some water with the food.  It doesn't happen with draper, but rather typically with meats.  She has never had a food impaction.  Happens with every meal.  No odynophagia, no nausea currently, no vomiting, rare reflux/heartburn. PPI not helpful in the past.  Reports was having nausea but improved with simple carbohydrate diet.  Reports when was nauseated, it was typically in the morning and improved with a protein breakfast sandwich.    Prior report of dysphagia and nausea,  Had upper endoscopy 2019, 1 gastric polyp (fundic gland).  Separate mid/distal esophageal, gastric and duodenal biopsies negative.    Patient notes Hx of thyroid cysts and borderline TSH levels.       Social hx   -no smoking  -1-2 x month nsaids  -rare alcohol   -no illicit  -Bike 12 miles a day, weights    Fam Hx  Mother with history colitis - no issues in years, colon polyps  Maternal grandfather  of colon cancer at age 76  Both parents have diverticulitis    Interval history, 2019  In August, started cipro x 2 weeks, flagyl x 2 weeks, lialda and prednisone. Notices increased pain when delays Lialda by a few hours (increased pain). Now down to prednisone 10 mg daily, going to 5 mg daily on Friday. Feels much improved. Blood has stopped; left sided pain has improved. Gained 5 lb since August. Continues on SCD; met with Rebekah Browne.    No upcoming travel plans.     Interval history, 2019  Continues on SCD, which has helped a lot.     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   Off prednisone x 3 weeks. Continues on Cortenema daily x 2 months.   Also on Lialda 4.8 g daily.     Having 2 BMs per day (non-bloody, well-formed). No blood in stool since August.   Gained a few lb since Sep.     Interval history, 2020 (virtual visit)  Got sick in Dec with wheezing and was dx with walking PNA . Treated with abx and then prednisone x 5 days.   Continues on 5  mg prednisone daily and has difficulty weaning off it due to recurrent abd pain. Has been on prednisone 5 mg x 1 month.     Having 2 BMs per day, non bloody, loose.     For SCAD, currently on Lialda 4.8g and Rowasa enemas.     Interval history, 12/2020 (virtual visit)  In beginning of December, had a flare in the setting of stress, with increased bowel frequency and LLQ abd pain. Started on Rowasa enemas with significant help. But around 12/25 ate a piece of cake and developed postprandial nausea and epigastric discomfort.      Continues on Lialda 4.8g daily and daily Rowasa enemas.     Currently having 2 BMs per day, soft. No blood.     EGD in 4/2019 was normal.     ROS:    No fevers or chills  No weight loss  No blurry vision, double vision or change in vision  No sore throat  No lymphadenopathy  No headache, paraesthesias, or weakness in a limb  No shortness of breath or wheezing  No chest pain or pressure  No arthralgias or myalgias  No rashes or skin changes  Yes dysphagia  Yes BRBPR, hematochezia  No dysuria, frequency or urgency  No hot/cold intolerance or polyria  No anxiety or depression    Extra intestinal manifestations of IBD:  No uveitis/episcleritis  No aphthous ulcers   No arthritis   No erythema nodosum/pyoderma gangrenosum.     PERTINENT PAST MEDICAL HISTORY:  None     PREVIOUS SURGERIES:  Past Surgical History:   Procedure Laterality Date     CHOLECYSTECTOMY       COLONOSCOPY       COLONOSCOPY N/A 8/12/2019    Procedure: COLONOSCOPY, WITH POLYPECTOMY AND BIOPSY;  Surgeon: Yolanda Molina MD;  Location: [facility] OR     Surgical hx  -Cholecystectomy 2010  -3 children - vaginal x 3, Bladder sling placed 2016    PREVIOUS ENDOSCOPY:  See above    ALLERGIES:     Allergies   Allergen Reactions     Sulfa Drugs Hives     Other reaction(s): *Unknown       PERTINENT MEDICATIONS:    Current Outpatient Medications:      albuterol (PROAIR HFA/PROVENTIL HFA/VENTOLIN HFA) 108 (90 Base) MCG/ACT inhaler, , Disp: , Rfl:       mesalamine (ROWASA) 4 g enema, Place 1 enema (4 g) rectally At Bedtime, Disp: 90 enema, Rfl: 3     Multiple Vitamins-Iron (MULTIVITAMIN/IRON PO), Take 1 tablet by mouth, Disp: , Rfl:      ACIDOPHILUS LACTOBACILLUS PO, Take 1 capsule by mouth, Disp: , Rfl:      amitriptyline (ELAVIL) 10 MG tablet, Take 10 mg by mouth, Disp: , Rfl:      budesonide (UCERIS) 9 MG 24 hr tablet, Take 1 tablet (9 mg) by mouth every morning For 30 days, then every other day for two weeks then stop (Patient not taking: Reported on 12/30/2020), Disp: 37 tablet, Rfl: 0     butalbital-acetaminophen-caffeine (FIORICET/ESGIC) -40 MG tablet, Take 1 tablet by mouth, Disp: , Rfl:      hydrocortisone (CORTENEMA) 100 MG/60ML enema, Place 1 enema rectally At Bedtime (Patient not taking: Reported on 12/30/2020), Disp: 60 mL, Rfl: 5     mesalamine (LIALDA) 1.2 g EC tablet, , Disp: , Rfl:      predniSONE (DELTASONE) 5 MG tablet, Take 40 mg po for one week, then decrease by one tablet a week until off. (Patient not taking: Reported on 12/30/2020), Disp: 320 tablet, Rfl: 1    SOCIAL HISTORY:  Social History     Socioeconomic History     Marital status:      Spouse name: Not on file     Number of children: Not on file     Years of education: Not on file     Highest education level: Not on file   Occupational History     Not on file   Social Needs     Financial resource strain: Not on file     Food insecurity     Worry: Not on file     Inability: Not on file     Transportation needs     Medical: Not on file     Non-medical: Not on file   Tobacco Use     Smoking status: Never Smoker     Smokeless tobacco: Never Used   Substance and Sexual Activity     Alcohol use: Yes     Comment: occasional     Drug use: Never     Sexual activity: Not on file   Lifestyle     Physical activity     Days per week: Not on file     Minutes per session: Not on file     Stress: Not on file   Relationships     Social connections     Talks on phone: Not on file      "Gets together: Not on file     Attends Jainism service: Not on file     Active member of club or organization: Not on file     Attends meetings of clubs or organizations: Not on file     Relationship status: Not on file     Intimate partner violence     Fear of current or ex partner: Not on file     Emotionally abused: Not on file     Physically abused: Not on file     Forced sexual activity: Not on file   Other Topics Concern     Parent/sibling w/ CABG, MI or angioplasty before 65F 55M? Not Asked   Social History Narrative     Not on file     FAMILY HISTORY:  Family History   Problem Relation Age of Onset     Diverticulitis Father      Inflammatory Bowel Disease No family hx of      Colon Cancer No family hx of        PHYSICAL EXAMINATION:  Constitutional: aaox3, cooperative, pleasant, not dyspneic/diaphoretic, no acute distress  Vitals reviewed: Ht 1.702 m (5' 7\")   Wt 61.2 kg (135 lb)   BMI 21.14 kg/m    Wt:   Wt Readings from Last 2 Encounters:   12/30/20 61.2 kg (135 lb)   09/18/19 61.6 kg (135 lb 11.2 oz)      General appearance  Healthy appearing adult, in no acute distress     Eyes  Sclera anicteric  Pupils round and reactive to light     Ears, nose, mouth and throat  No obvious external lesions of ears and nose  Hearing intact     Neck  Symmetric  No obvious external lesions     Respiratory  Normal respiration, no use of accessory muscles      MSK  Gait normal     Skin  No rashes or jaundice      Psychiatric  Oriented to person, place and time  Appropriate mood and affect.     PERTINENT STUDIES:    Most recent hepatic panel:  Recent Labs   Lab Test 07/22/19 06/27/19   ALT 16 14   AST 19 15     Most recent creatinine:  Recent Labs   Lab Test 07/22/19 06/27/19   CR 0.72 0.80     Video-Visit Details    Type of service:  Video Visit    Video Start Time: 4:42 PM  Video End Time: 5:12 PM    Originating Location (pt. Location): Home    Distant Location (provider location):  [facility] " [facility]     Platform used for Video Visit: Ally                Again, thank you for allowing me to participate in the care of your patient.        Sincerely,        Yolanda Molina MD

## 2020-12-30 NOTE — PROGRESS NOTES
"Velma Mcfarlane is a 45 year old female who is being evaluated via a billable video visit.      The patient has been notified of following:     \"This video visit will be conducted via a call between you and your physician/provider. We have found that certain health care needs can be provided without the need for an in-person physical exam.  This service lets us provide the care you need with a video conversation.  If a prescription is necessary we can send it directly to your pharmacy.  If lab work is needed we can place an order for that and you can then stop by our lab to have the test done at a later time.    Video visits are billed at different rates depending on your insurance coverage.  Please reach out to your insurance provider with any questions.    If during the course of the call the physician/provider feels a video visit is not appropriate, you will not be charged for this service.\"    Patient has given verbal consent for Video visit? Yes  How would you like to obtain your AVS? MyChart  If you are dropped from the video visit, the video invite should be resent to: Text to cell phone: [phone]  Will anyone else be joining your video visit? No      IBD CLINIC VISIT -- follow up    CHIEF COMPLAINT: SCAD evaluation and management    SCAD HISTORY  Age at diagnosis: 44  Extent of disease: SCAD, left sided rui-diverticular inflammation  Current SCAD medications: Lialda 4.8 G daily, prednisone 10 mg daily --> 5 mg on Friday    Prior SCAD Medications: Cipro/flagyl (helped)    DISEASE ASSESSMENT  Labs:  Recent Labs   Lab Test 06/19/19  1009   CRP <2.9   SED 9     Endoscopic assessment:     8/2019 icscope showed Dee 2 colitis from 30 cm-40cm. Diverticulosis in the sigmoid. Rest of colon was normal.     PATH:  SPECIMEN(S):   A: Cecal biopsy   B: Colon biopsy, ascending   C: Colon biopsy, transverse   D: Colon biopsy, descending   E: Sigmoid colon biopsy   F: Rectal biopsy     FINAL DIAGNOSIS:   A. " Cecum, biopsy:   Colonic mucosa with no pathologic abnormalities     B. Ascending Colon, Biopsy:   Colonic mucosa with no pathologic abnormalities     C. Transverse Colon, biopsy:   Colonic mucosa with no pathologic abnormalities     D. Descending Colon, biopsy:   Colonic mucosa with no pathologic abnormalities     E. Sigmoid Colon, biopsy:   Colitis with crypt injury, moderately active, with rectum sparing (see   part F below); consistent with SCAD   (segmental colitisassociated diverticulosis); negative for dysplasia     F. Rectum, biopsy:   Rectal mucosa with no pathologic abnormalities     EGD 4/15/19 Normal, normal biopsies of esophagus, stomach and duodenum  3/25/19 colonoscopy for rectal bleeding: left sided diverticular with edema, erythema, loss of vascular pattern noted in 2 segments (40-36 cm and 30-22 cm from the anus.    CT ab/pelvis 3/5/19:  Renal hypodensities stable from 4/16/19 exam, 2 mm calculus in right kidney, no gastric or small bowel abnormality  Fecal calprotectin: pending  C diff: negative per OSH records      ASSESSMENT/PLAN  Mrs. Salter is a 45 year old woman with history of cholecystectomy, 3 uncomplicated vaginal deliveries, thyroid cysts (per patient), and segmental colitis associated with diverticular disease who is following up for management of her disease in the setting of a flare and epigastric, postprandial discomfort.    We will proceed with the following plan:       Plan:  ---Omeprazole for epigastric discomfort for possible GERD/dyspepsia   ---Cortenemas ordered, since these were particularly effective in the past.  ---Continue Lialda 4.8 g daily    Return to clinic in 3 months      HPI:   Mrs. Salter is a 45 year old woman with history of cholecystectomy, 3 uncomplicated vaginal deliveries, thyroid cysts (per patient), and recent diagnosis of segmental colitis associated with diverticular disease who is establishing care for management of her disease.      Per chart:   "Patient seen about 2014 at [facility] for diverticulitis and last seen 5/2019 for follow up of SCAD and nausea.  Patient had subtle symptoms of Patient had colonoscopy 3/2019 with finding of edema, erythema, loss of and loss of vascular patter in 2 segments of left colon with mild to moderate diverticulosis.  Sigmoid biopsies showed mildly active chronic colitis consistent with SCAD.  Patient was treated with 10 day course of cipro/flagyl with improvement in symptoms followed by Lialda 4.8 G daily with possible improvement. On 4/11/19 the patient was found to be c.diff negative.      At time of consultation, patient reported frustation with lack of symptom improvement and communication at prior GI providers office.  Here to establish care.  Patient reports 5 years of alternating diarrhea and constipation along with occassional undigested food in stool and borrygymi.  She reports had initial improvement in symptoms following cipro/flagyl and mesalamine but called in as still was having blood streaked stools for which she was started on rowasa enemas.  She is concerned she is still flaring despite the therapies and putting herself on a low residue \"simple carbohydrate diet\" supplemented with \"high protein predigested cancer shakes\".  She reports weight loss in the setting of biking 12 miles daily and changed diet.      At time of consultation the patient reports two semiformed stools daily with blood streaks in all stools (10% blood) and with wiping.  She notes one day a week she will have bloating, increased flatus, general malaise, 5-6 watery BM's along with no associated abdominal pain.      Patient also reports 10 years of chronic food getting stuck in her esophagus until drinks some water with the food.  It doesn't happen with draper, but rather typically with meats.  She has never had a food impaction.  Happens with every meal.  No odynophagia, no nausea currently, no vomiting, rare reflux/heartburn. PPI not helpful " in the past.  Reports was having nausea but improved with simple carbohydrate diet.  Reports when was nauseated, it was typically in the morning and improved with a protein breakfast sandwich.    Prior report of dysphagia and nausea,  Had upper endoscopy 2019, 1 gastric polyp (fundic gland).  Separate mid/distal esophageal, gastric and duodenal biopsies negative.    Patient notes Hx of thyroid cysts and borderline TSH levels.       Social hx   -no smoking  -1-2 x month nsaids  -rare alcohol   -no illicit  -Bike 12 miles a day, weights    Fam Hx  Mother with history colitis - no issues in years, colon polyps  Maternal grandfather  of colon cancer at age 76  Both parents have diverticulitis    Interval history, 2019  In August, started cipro x 2 weeks, flagyl x 2 weeks, lialda and prednisone. Notices increased pain when delays Lialda by a few hours (increased pain). Now down to prednisone 10 mg daily, going to 5 mg daily on Friday. Feels much improved. Blood has stopped; left sided pain has improved. Gained 5 lb since August. Continues on SCD; met with Rebekah Browne.    No upcoming travel plans.     Interval history, 2019  Continues on SCD, which has helped a lot.     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   Off prednisone x 3 weeks. Continues on Cortenema daily x 2 months.   Also on Lialda 4.8 g daily.     Having 2 BMs per day (non-bloody, well-formed). No blood in stool since August.   Gained a few lb since Sep.     Interval history, 2020 (virtual visit)  Got sick in Dec with wheezing and was dx with walking PNA . Treated with abx and then prednisone x 5 days.   Continues on 5 mg prednisone daily and has difficulty weaning off it due to recurrent abd pain. Has been on prednisone 5 mg x 1 month.     Having 2 BMs per day, non bloody, loose.     For SCAD, currently on Lialda 4.8g and Rowasa enemas.     Interval history, 2020 (virtual visit)  In beginning of December, had a  flare in the setting of stress, with increased bowel frequency and LLQ abd pain. Started on Rowasa enemas with significant help. But around 12/25 ate a piece of cake and developed postprandial nausea and epigastric discomfort.      Continues on Lialda 4.8g daily and daily Rowasa enemas.     Currently having 2 BMs per day, soft. No blood.     EGD in 4/2019 was normal.     ROS:    No fevers or chills  No weight loss  No blurry vision, double vision or change in vision  No sore throat  No lymphadenopathy  No headache, paraesthesias, or weakness in a limb  No shortness of breath or wheezing  No chest pain or pressure  No arthralgias or myalgias  No rashes or skin changes  Yes dysphagia  Yes BRBPR, hematochezia  No dysuria, frequency or urgency  No hot/cold intolerance or polyria  No anxiety or depression    Extra intestinal manifestations of IBD:  No uveitis/episcleritis  No aphthous ulcers   No arthritis   No erythema nodosum/pyoderma gangrenosum.     PERTINENT PAST MEDICAL HISTORY:  None     PREVIOUS SURGERIES:  Past Surgical History:   Procedure Laterality Date     CHOLECYSTECTOMY       COLONOSCOPY       COLONOSCOPY N/A 8/12/2019    Procedure: COLONOSCOPY, WITH POLYPECTOMY AND BIOPSY;  Surgeon: Yolanda Molina MD;  Location: [facility] OR     Surgical hx  -Cholecystectomy 2010  -3 children - vaginal x 3, Bladder sling placed 2016    PREVIOUS ENDOSCOPY:  See above    ALLERGIES:     Allergies   Allergen Reactions     Sulfa Drugs Hives     Other reaction(s): *Unknown       PERTINENT MEDICATIONS:    Current Outpatient Medications:      albuterol (PROAIR HFA/PROVENTIL HFA/VENTOLIN HFA) 108 (90 Base) MCG/ACT inhaler, , Disp: , Rfl:      mesalamine (ROWASA) 4 g enema, Place 1 enema (4 g) rectally At Bedtime, Disp: 90 enema, Rfl: 3     Multiple Vitamins-Iron (MULTIVITAMIN/IRON PO), Take 1 tablet by mouth, Disp: , Rfl:      ACIDOPHILUS LACTOBACILLUS PO, Take 1 capsule by mouth, Disp: , Rfl:      amitriptyline (ELAVIL) 10 MG tablet,  Take 10 mg by mouth, Disp: , Rfl:      budesonide (UCERIS) 9 MG 24 hr tablet, Take 1 tablet (9 mg) by mouth every morning For 30 days, then every other day for two weeks then stop (Patient not taking: Reported on 12/30/2020), Disp: 37 tablet, Rfl: 0     butalbital-acetaminophen-caffeine (FIORICET/ESGIC) -40 MG tablet, Take 1 tablet by mouth, Disp: , Rfl:      hydrocortisone (CORTENEMA) 100 MG/60ML enema, Place 1 enema rectally At Bedtime (Patient not taking: Reported on 12/30/2020), Disp: 60 mL, Rfl: 5     mesalamine (LIALDA) 1.2 g EC tablet, , Disp: , Rfl:      predniSONE (DELTASONE) 5 MG tablet, Take 40 mg po for one week, then decrease by one tablet a week until off. (Patient not taking: Reported on 12/30/2020), Disp: 320 tablet, Rfl: 1    SOCIAL HISTORY:  Social History     Socioeconomic History     Marital status:      Spouse name: Not on file     Number of children: Not on file     Years of education: Not on file     Highest education level: Not on file   Occupational History     Not on file   Social Needs     Financial resource strain: Not on file     Food insecurity     Worry: Not on file     Inability: Not on file     Transportation needs     Medical: Not on file     Non-medical: Not on file   Tobacco Use     Smoking status: Never Smoker     Smokeless tobacco: Never Used   Substance and Sexual Activity     Alcohol use: Yes     Comment: occasional     Drug use: Never     Sexual activity: Not on file   Lifestyle     Physical activity     Days per week: Not on file     Minutes per session: Not on file     Stress: Not on file   Relationships     Social connections     Talks on phone: Not on file     Gets together: Not on file     Attends Mandaeism service: Not on file     Active member of club or organization: Not on file     Attends meetings of clubs or organizations: Not on file     Relationship status: Not on file     Intimate partner violence     Fear of current or ex partner: Not on file      "Emotionally abused: Not on file     Physically abused: Not on file     Forced sexual activity: Not on file   Other Topics Concern     Parent/sibling w/ CABG, MI or angioplasty before 65F 55M? Not Asked   Social History Narrative     Not on file     FAMILY HISTORY:  Family History   Problem Relation Age of Onset     Diverticulitis Father      Inflammatory Bowel Disease No family hx of      Colon Cancer No family hx of        PHYSICAL EXAMINATION:  Constitutional: aaox3, cooperative, pleasant, not dyspneic/diaphoretic, no acute distress  Vitals reviewed: Ht 1.702 m (5' 7\")   Wt 61.2 kg (135 lb)   BMI 21.14 kg/m    Wt:   Wt Readings from Last 2 Encounters:   12/30/20 61.2 kg (135 lb)   09/18/19 61.6 kg (135 lb 11.2 oz)      General appearance  Healthy appearing adult, in no acute distress     Eyes  Sclera anicteric  Pupils round and reactive to light     Ears, nose, mouth and throat  No obvious external lesions of ears and nose  Hearing intact     Neck  Symmetric  No obvious external lesions     Respiratory  Normal respiration, no use of accessory muscles      MSK  Gait normal     Skin  No rashes or jaundice      Psychiatric  Oriented to person, place and time  Appropriate mood and affect.     PERTINENT STUDIES:    Most recent hepatic panel:  Recent Labs   Lab Test 07/22/19 06/27/19   ALT 16 14   AST 19 15     Most recent creatinine:  Recent Labs   Lab Test 07/22/19 06/27/19   CR 0.72 0.80     Video-Visit Details    Type of service:  Video Visit    Video Start Time: 4:42 PM  Video End Time: 5:12 PM    Originating Location (pt. Location): Home    Distant Location (provider location):  [facility] [place]     Platform used for Video Visit: Stackdriver            "

## 2020-12-30 NOTE — NURSING NOTE
"Chief Complaint   Patient presents with     RECHECK     renew medications       Vitals:    12/30/20 1518   Weight: 61.2 kg (135 lb)   Height: 1.702 m (5' 7\")       Body mass index is 21.14 kg/m .      Grey Che LPN                        "

## 2020-12-30 NOTE — LETTER
Date:March 9, 2021      Patient was self referred, no letter generated. Do not send.        [facility] Health Information

## 2021-01-07 ENCOUNTER — PATIENT OUTREACH (OUTPATIENT)
Dept: GASTROENTEROLOGY | Facility: CLINIC | Age: 46
End: 2021-01-07

## 2021-01-07 DIAGNOSIS — K50.10 SEGMENTAL COLITIS ASSOCIATED WITH DIVERTICULOSIS (H): ICD-10-CM

## 2021-01-07 DIAGNOSIS — K57.30 SEGMENTAL COLITIS ASSOCIATED WITH DIVERTICULOSIS (H): ICD-10-CM

## 2021-01-07 RX ORDER — HYDROCORTISONE 100 MG/60ML
100 SUSPENSION RECTAL AT BEDTIME
Qty: 30 ENEMA | Refills: 1 | Status: SHIPPED | OUTPATIENT
Start: 2021-01-07 | End: 2021-01-11

## 2021-01-08 RX ORDER — MESALAMINE 1.2 G/1
TABLET, DELAYED RELEASE ORAL
Status: CANCELLED | OUTPATIENT
Start: 2021-01-08

## 2021-01-08 NOTE — TELEPHONE ENCOUNTER
mesalamine (LIALDA) 1.2 g EC tablet  Last Written Prescription Date:  unknown  Last Fill Quantity: unknown,   # refills: unknown  Last Office Visit :12/30/20  Future Office visit:  None    Routing refill request to provider for review/approval because: historical

## 2021-01-11 RX ORDER — HYDROCORTISONE 100 MG/60ML
100 SUSPENSION RECTAL AT BEDTIME
Qty: 30 ENEMA | Refills: 3 | Status: SHIPPED | OUTPATIENT
Start: 2021-01-11 | End: 2022-02-22

## 2021-01-14 ENCOUNTER — PATIENT OUTREACH (OUTPATIENT)
Dept: GASTROENTEROLOGY | Facility: CLINIC | Age: 46
End: 2021-01-14

## 2021-01-14 DIAGNOSIS — K50.10 SEGMENTAL COLITIS ASSOCIATED WITH DIVERTICULOSIS (H): Primary | ICD-10-CM

## 2021-01-14 DIAGNOSIS — K57.30 SEGMENTAL COLITIS ASSOCIATED WITH DIVERTICULOSIS (H): Primary | ICD-10-CM

## 2021-01-14 RX ORDER — MESALAMINE 1.2 G/1
2400 TABLET, DELAYED RELEASE ORAL DAILY
Qty: 180 TABLET | Refills: 1 | Status: SHIPPED | OUTPATIENT
Start: 2021-01-14 | End: 2021-01-18

## 2021-01-18 ENCOUNTER — PATIENT OUTREACH (OUTPATIENT)
Dept: GASTROENTEROLOGY | Facility: CLINIC | Age: 46
End: 2021-01-18

## 2021-01-18 DIAGNOSIS — K50.10 SEGMENTAL COLITIS ASSOCIATED WITH DIVERTICULOSIS (H): ICD-10-CM

## 2021-01-18 DIAGNOSIS — K57.30 SEGMENTAL COLITIS ASSOCIATED WITH DIVERTICULOSIS (H): ICD-10-CM

## 2021-01-18 RX ORDER — MESALAMINE 1.2 G/1
4800 TABLET, DELAYED RELEASE ORAL DAILY
Qty: 360 TABLET | Refills: 1 | Status: SHIPPED | OUTPATIENT
Start: 2021-01-18 | End: 2021-09-22

## 2021-01-25 DIAGNOSIS — K50.10 SEGMENTAL COLITIS ASSOCIATED WITH DIVERTICULOSIS (H): Primary | ICD-10-CM

## 2021-01-25 DIAGNOSIS — K57.30 SEGMENTAL COLITIS ASSOCIATED WITH DIVERTICULOSIS (H): Primary | ICD-10-CM

## 2021-01-25 RX ORDER — CIPROFLOXACIN 500 MG/1
500 TABLET, FILM COATED ORAL 2 TIMES DAILY
Qty: 28 TABLET | Refills: 0 | Status: SHIPPED | OUTPATIENT
Start: 2021-01-25 | End: 2021-02-08

## 2021-01-25 RX ORDER — METRONIDAZOLE 500 MG/1
500 TABLET ORAL 2 TIMES DAILY
Qty: 28 TABLET | Refills: 0 | Status: SHIPPED | OUTPATIENT
Start: 2021-01-25 | End: 2021-02-08

## 2021-02-07 ENCOUNTER — HEALTH MAINTENANCE LETTER (OUTPATIENT)
Age: 46
End: 2021-02-07

## 2021-03-15 DIAGNOSIS — E71.312 SCAD (SHORT-CHAIN ACYL-COA DEHYDROGENASE DEFICIENCY) (H): ICD-10-CM

## 2021-03-16 NOTE — TELEPHONE ENCOUNTER
"predniSONE (DELTASONE) 5 MG tablet    Last Written Prescription Date:  2/19/21  Last Fill Quantity: 45,   # refills: 0  Last Office Visit : 12/30/20  Future Office visit: none      Routing refill request to provider for review/approval because:  Not on protocol. Written  \"every three days until off\". rf?      "

## 2021-04-04 RX ORDER — PREDNISONE 5 MG/1
TABLET ORAL
Qty: 45 TABLET | Refills: 0 | Status: SHIPPED | OUTPATIENT
Start: 2021-04-04 | End: 2022-11-22

## 2021-04-07 ENCOUNTER — IMMUNIZATION (OUTPATIENT)
Dept: NURSING | Facility: CLINIC | Age: 46
End: 2021-04-07
Payer: COMMERCIAL

## 2021-04-07 PROCEDURE — 91300 PR COVID VAC PFIZER DIL RECON 30 MCG/0.3 ML IM: CPT

## 2021-04-07 PROCEDURE — 0001A PR COVID VAC PFIZER DIL RECON 30 MCG/0.3 ML IM: CPT

## 2021-04-19 ENCOUNTER — TELEPHONE (OUTPATIENT)
Dept: GASTROENTEROLOGY | Facility: CLINIC | Age: 46
End: 2021-04-19

## 2021-04-19 NOTE — TELEPHONE ENCOUNTER
Tried calling pt, could not LVM. Will send [place] about if pt needs a f/u with Dr. Molina for virtual visit.

## 2021-04-23 ENCOUNTER — RECORDS - HEALTHEAST (OUTPATIENT)
Dept: LAB | Facility: CLINIC | Age: 46
End: 2021-04-23

## 2021-04-23 LAB
SARS-COV-2 PCR COMMENT: NORMAL
SARS-COV-2 RNA SPEC QL NAA+PROBE: NEGATIVE
SARS-COV-2 VIRUS SPECIMEN SOURCE: NORMAL

## 2021-04-28 ENCOUNTER — IMMUNIZATION (OUTPATIENT)
Dept: NURSING | Facility: CLINIC | Age: 46
End: 2021-04-28
Payer: COMMERCIAL

## 2021-04-28 PROCEDURE — 91300 PR COVID VAC PFIZER DIL RECON 30 MCG/0.3 ML IM: CPT

## 2021-04-28 PROCEDURE — 0002A PR COVID VAC PFIZER DIL RECON 30 MCG/0.3 ML IM: CPT

## 2021-05-24 ENCOUNTER — RECORDS - HEALTHEAST (OUTPATIENT)
Dept: ADMINISTRATIVE | Facility: CLINIC | Age: 46
End: 2021-05-24

## 2021-05-25 ENCOUNTER — RECORDS - HEALTHEAST (OUTPATIENT)
Dept: ADMINISTRATIVE | Facility: CLINIC | Age: 46
End: 2021-05-25

## 2021-05-26 ENCOUNTER — RECORDS - HEALTHEAST (OUTPATIENT)
Dept: ADMINISTRATIVE | Facility: CLINIC | Age: 46
End: 2021-05-26

## 2021-05-27 ENCOUNTER — RECORDS - HEALTHEAST (OUTPATIENT)
Dept: ADMINISTRATIVE | Facility: CLINIC | Age: 46
End: 2021-05-27

## 2021-05-28 NOTE — TELEPHONE ENCOUNTER
Spoke with patient her surgery was canceled by us because Dr Chatman is unavailable. Patient will call back when can rescedule

## 2021-05-31 ENCOUNTER — RECORDS - HEALTHEAST (OUTPATIENT)
Dept: ADMINISTRATIVE | Facility: CLINIC | Age: 46
End: 2021-05-31

## 2021-06-01 ENCOUNTER — RECORDS - HEALTHEAST (OUTPATIENT)
Dept: ADMINISTRATIVE | Facility: CLINIC | Age: 46
End: 2021-06-01

## 2021-06-01 VITALS — WEIGHT: 139.7 LBS

## 2021-06-02 ENCOUNTER — RECORDS - HEALTHEAST (OUTPATIENT)
Dept: ADMINISTRATIVE | Facility: CLINIC | Age: 46
End: 2021-06-02

## 2021-06-02 VITALS — HEIGHT: 67 IN | WEIGHT: 139.4 LBS | BODY MASS INDEX: 21.88 KG/M2

## 2021-06-20 NOTE — PROGRESS NOTES
[facility] Surgery Follow up    HPI:    43 y.o. year old female who returns for a follow up.     Allergies:Sulfa (sulfonamide antibiotics)    Past Medical History:   Diagnosis Date     Renal infarct (H)        Past Surgical History:   Procedure Laterality Date     HI REMOVAL GALLBLADDER      Description: Cholecystectomy;  Recorded: 04/06/2011;       CURRENT MEDS:  Current Outpatient Prescriptions on File Prior to Visit   Medication Sig Dispense Refill     amitriptyline (ELAVIL) 10 MG tablet Take 10 mg by mouth at bedtime as needed for sleep.       aspirin 81 mg chewable tablet Chew 81 mg daily.       isometheptene-acetaminophen-dichloralphenazone (MIDRIN) -325 mg per capsule Take 1 capsule by mouth 4 (four) times a day as needed.       Lactobacillus rhamnosus GG (CULTURELLE) 10-15 Billion cell capsule Take 1 capsule by mouth daily.       No current facility-administered medications on file prior to visit.        Family History   Problem Relation Age of Onset     Coronary artery disease Father      Stroke Maternal Grandfather      Stroke Paternal Grandfather         reports that she has never smoked. She has never used smokeless tobacco. She reports that she drinks alcohol. She reports that she does not use illicit drugs.    Review of Systems:  Negative except leg pain, swelling and varicose veins.    OBJECTIVE:  Vitals:    09/11/18 0838   BP: 100/60   Pulse: 76   Resp: 14   Temp: 99.5  F (37.5  C)   TempSrc: Oral     There is no height or weight on file to calculate BMI.    EXAM:  GENERAL: This is a well-developed 43 y.o. female who appears her stated age  HEAD: normocephalic  HEENT: Pupils equal and reactive bilaterally  CARDIAC: RRR without murmur  CHEST/LUNG:  Clear to auscultation  ABDOMEN: Soft, nontender, nondistended, no masses    NEUROLOGIC: Focally intact, nonfocal  VASCULAR: Pulses intact, symmetrical upper and lower extremities.        LABS:  Lab Results   Component Value Date    WBC 8.1 11/07/2017     HGB 13.5 11/07/2017    HCT 39.5 11/07/2017    MCV 91 11/07/2017     11/07/2017     INR/Prothrombin Time      No results found for: HGBA1C  Lab Results   Component Value Date    ALT 12 11/07/2017    AST 14 11/07/2017    ALKPHOS 68 11/07/2017    BILITOT 0.5 11/07/2017        Images:     US Venous Insufficiency Legs Bilateral (Order [id number])   Imaging   Date: 9/11/2018 Department: [facility] Ultrasound [place] Released By: Сергей Che Authorizing: Hardy Chatman MD   Study Result   [facility] OUTPATIENT  9/11/2018 8:19 AM     EXAM: BILATERAL LOWER EXTREMITY DEEP AND SUPERFICIAL VENOUS DUPLEX ULTRASOUND WITH PHYSIOLOGIC TESTING     INDICATION: Symptomatic varicose veins. Assess for incompetent veins.     TECHNIQUE: Supine and upright ultrasound of the deep and superficial veins with Valsalva and compression augmentation maneuvers. Duplex imaging is performed utilizing gray-scale, two-dimensional images, color-flow imaging, Doppler waveform analysis, and   spectral Doppler imaging.      INCOMPETENCY CRITERIA: Deep vein reflux reported when greater than 1,000 ms flow reversal.  Superficial vein reflux reported when greater than 500 ms flow reversal.  vein reflux reported as greater than 350 ms flow reversal.     DEEP VEIN FINDINGS:     RIGHT LEG: The common femoral, profunda femoral, femoral, popliteal, and visualized calf veins are patent and compressible.     LEFT LEG:  The common femoral, profunda femoral, femoral, popliteal, and visualized calf veins are patent and compressible.      RIGHT SUPERFICIAL VEIN FINDINGS:  GREAT SAPHENOUS VEIN: Competent from the saphenofemoral junction to the proximal thigh. Incompetent at the knee and mid calf. The vein measures 4 mm at the knee and 4 mm at the mid calf.     SMALL SAPHENOUS VEIN: Competent from the saphenopopliteal junction to the mid calf.     No incompetent perforating veins or abnormal accessory veins  identified.     LEFT SUPERFICIAL VEIN FINDINGS:  GREAT SAPHENOUS VEIN: Competent from the saphenofemoral junction to the mid calf.     SMALL SAPHENOUS VEIN: Competent from the saphenopopliteal junction to the mid calf.     No incompetent perforating veins or abnormal accessory veins identified.     IMPRESSION:   CONCLUSION:   1.  No deep venous thrombosis of either lower extremity.  2.  RIGHT LEG: The right superficial venous system is patent, with incompetence of the greater saphenous vein at the knee and mid calf.  3.  LEFT LEG: The left superficial venous system is patent, without incompetence.         Assessment/Plan:   1. Symptomatic varicose veins of both lower extremities  Not a closure candidate at this time.  Continue compression, exercise and weight control and return in two months for a recheck.      Return in about 2 months (around 11/11/2018) for Recheck.     Hardy Chatman MD  [facility] Department of Surgery

## 2021-06-20 NOTE — PROGRESS NOTES
ASSESSMENT AND PLAN:  Velma Granados 43 y.o. female is seen here on 08/24/18 for evaluation of history of abdominal pain which is now resolved, during its evaluation she had an MRI of the abdomen as noted which raise a question of if she has left kidneys lower pole infarcts.  There is been no definite vascular abnormality detected apart from a splenic artery aneurysm, single.  She does not have evidence of autoimmunity, or serologic evidence of thrombophilia.  There are no clinical features to suggest a vasculitic process such as iritis, chondritis, mononeuritis multiplex, synovitis pleuropericardial rub, purpura, erythema nodosum, lower extremity ulceration or nodules.  I have discussed these findings with her.  These are all reassuring.  In part depending upon what she finds after CTA will get together her one more time.  She will follow-up in the next 4-6 months, or sooner.  Diagnoses and all orders for this visit:    Abdominal pain    Abnormal MRI, kidney    Splenic artery aneurysm (H)      HISTORY OF PRESENTING ILLNESS:  Velma Granados, 43 y.o., female is here for evaluation of question of if she has vasculitis.  She reports that after having had abdominal discomfort for several months she had further workup.  These symptoms began in middle of last year. she had an MRI of the abdomen which showed abnormalities in the left kidney leading to the question of if she may have had infarcts there.  FINDINGS:   LUNG BASES: Clear.     ABDOMEN: Foci of decreased perfusion somewhat wedge-shaped involving the mid to lower pole left kidney seen best on post gadolinium and diffusion-weighted sequences. Equivocal abnormal signal within portions of right kidney on diffusion-weighted imaging   not confirmed on other sequences. No hydronephrosis. No renal mass. Liver, spleen, adrenal glands, and pancreas unremarkable. No adenopathy. Cholecystectomy.     MR ANGIOGRAM: There is equivocal subtle beading of the  renal arteries bilaterally on MIP reconstruction which is likely artifact. There is no evidence for hemodynamically significant stenosis within the aorta or major vessels off the aorta including the   pelvic arteries. 8 x 10 mm splenic artery aneurysm near splenic hilum.     IMPRESSION:   CONCLUSION:  1.  Abnormal enhancement to portions of mid to lower pole left kidney with differential including pyelonephritis and less likely small renal infarcts. Comparison with prior CT would be helpful.  2.  Unremarkable MR angiogram with exception of equivocal beading of the renal arteries on MIP reconstruction which is likely artifact. Fibromuscular dysplasia, however, would be difficult to completely exclude. If further evaluation is warranted CT   angiogram recommended.  3.  8 x 10 mm splenic artery aneurysm.    This led to further workup.  She was seen in hematology.  She had no evidence of autoimmunity including evidence of antiphospholipid antibodies, lupus anticoagulant, EUNICE, cryoglobulins, and other thrombophilic entities.  Her symptoms have resolved.  She is due to be seen in vascular surgery for consideration for an angiography.  She may end up going to [facility].  She reports no mouth ulcers.  She has not had genital ulcers.  No photosensitivity ocular features such as iritis.  She has not had pleuritic symptoms.  As per anesthesia.  There is no DVT PE.  She describes no purpuric lesions on the lower extremities or elsewhere.  She is not had significant joint pains other than the right hand discomfort after a days work at her mouse. She has noted fatigue, feeling generally dry.  Easy bruising.  Has had headaches, history of anxiety.  Morning stiffness is no more than 5 minutes.  She has not had swelling around her ears, nose.  No sinus congestion hemoptysis and new onset of asthma.  She describes no numbness or tingling foot or wrist drop.  She is not a smoker alcohol 1-2 drinks per week.  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  She has had 3 pregnancies no miscarriages.  Further historical information and ADL limitations as noted in the multidimensional health assessment questionnaire attached in the EMR. Rest of the 13 system ROS is negative.     ALLERGIES:Sulfa (sulfonamide antibiotics)    PAST MEDICAL/ACTIVE PROBLEMS/MEDICATION/ FAMILY HISTORY/SOCIAL DATA:  The patient has a family history of  No past medical history on file.  History   Smoking Status     Not on file   Smokeless Tobacco     Not on file     Patient Active Problem List   Diagnosis     Nontoxic Multinodular Goiter     Current Outpatient Prescriptions   Medication Sig Dispense Refill     aspirin 81 mg chewable tablet Chew 81 mg daily.       Lactobacillus rhamnosus GG (CULTURELLE) 10-15 Billion cell capsule Take 1 capsule by mouth daily.       No current facility-administered medications for this visit.        COMPREHENSIVE EXAMINATION:  Vitals:    08/24/18 1348   BP: 108/68   Patient Site: Right Arm   Patient Position: Sitting   Cuff Size: Adult Regular   Pulse: 70   Weight: 139 lb 11.2 oz (63.4 kg)     A well appearing alert oriented female. Vital data as noted above. Her eyes without inflammation/scleromalacia. ENTwithout oral mucositis, thrush, nasal deformity, external ear redness, deformity. Her neck is without lymphadenopathy and supple. Lungs normal sounds, no pleural rub. Heart auscultation normal rate, rhythm; no pericardial rub and murmurs. Abdomen soft, non tender, no organomegaly. Skin examined for heliotrope, malar area eruption, lupus pernio, periungual erythema, sclerodactyly, papules, erythema nodosum, purpura, nail pitting, onycholysis, and obvious psoriasis lesion. Neurological examination shows normal alertness, speech, facial symmetry, tone and power in upper and lower extremities, Tinel's and Phalen's at wrist and gait. The joint examination is performed for swelling, tenderness, warmth,  erythema, and range of motion in the following joints: DIPs, PIPs, MCPs, wrists, first CMC's, elbows, shoulders, hips, knees, ankles, feet; spine for range of motion and paraspinal muscles for tenderness. The salient normal / abnormal findings are appended.  No stomatitis.  She does not have ocular inflammation.  There is no nasal ear tracheal cartilage tenderness.  No pleural pericardial rub.  She does not have synovitis in any of the palpable appendicular joints.  No JLT of the knees.  She does not have purpuric lesions in the upper or lower extremities.  There is no sclerodactyly periungual erythema malar area eruption.  There is no digital infarcts.  She does not have nodularities, erythema nodosum, ulceration on the lower extremities.    LAB / IMAGING DATA:  ALT   Date Value Ref Range Status   11/07/2017 12 0 - 45 U/L Final   04/25/2016 10 0 - 45 U/L Final   12/27/2009 12 <46 IU/L Final     Albumin   Date Value Ref Range Status   11/07/2017 4.1 3.5 - 5.0 g/dL Final   04/25/2016 3.8 3.5 - 5.0 g/dL Final   12/27/2009 4.1 3.5 - 5.0 g/dL Final     Creatinine   Date Value Ref Range Status   05/17/2018 0.69 0.60 - 1.10 mg/dL Final   04/19/2018 0.74 0.60 - 1.10 mg/dL Final   11/07/2017 0.73 0.60 - 1.10 mg/dL Final       WBC   Date Value Ref Range Status   11/07/2017 8.1 4.0 - 11.0 thou/uL Final   03/20/2017 8.5 4.0 - 11.0 thou/uL Final     Hemoglobin   Date Value Ref Range Status   11/07/2017 13.5 12.0 - 16.0 g/dL Final   03/20/2017 13.6 12.0 - 16.0 g/dL Final   04/25/2016 10.2 (L) 12.0 - 16.0 g/dL Final     Platelets   Date Value Ref Range Status   11/07/2017 341 140 - 440 thou/uL Final   03/20/2017 305 140 - 440 thou/uL Final   04/25/2016 376 140 - 440 thou/uL Final       Lab Results   Component Value Date    RF <15.0 11/07/2017    SEDRATE 20 06/20/2018

## 2021-06-20 NOTE — PROGRESS NOTES
[facility] Vein Consult      Assessment:     1. varicose veins, bilateral   2. spider veins, bilateral     Plan:     1. Treatment options of conservative therapy of stockings use, exercise, weight loss,  elevating legs when possible.    2. Script for compression stockings 20-30 mm hg  3. Ultrasound to evaluate legs for incompetency of both deep and superficial system .   4. Surgical treatment, discussed briefly  5. Follow up: after ultrasound.   6. Call for any questions concerns or issues    Subjective:      Velma Granados is a 43 y.o. female  who was referred by Stacia Yang PA-C  for evaluation of varicose veins. Symptoms include pain, aching, fatigue, burning, edema and dermatitis. Patient has history of leg selling, pain and vein issues that have progressed. Pain and symptoms have affected daily living and work activities needing medications. Here for evaluation today. Stocking use with compression stockings of 20-30 mm hg or greater for 2 weeks    Allergies:Sulfa (sulfonamide antibiotics)    Past Medical History:   Diagnosis Date     Renal infarct (H)        Past Surgical History:   Procedure Laterality Date     AL REMOVAL GALLBLADDER      Description: Cholecystectomy;  Recorded: 04/06/2011;       Current Outpatient Prescriptions   Medication Sig     amitriptyline (ELAVIL) 10 MG tablet Take 10 mg by mouth at bedtime as needed for sleep.     aspirin 81 mg chewable tablet Chew 81 mg daily.     isometheptene-acetaminophen-dichloralphenazone (MIDRIN) -325 mg per capsule Take 1 capsule by mouth 4 (four) times a day as needed.     Lactobacillus rhamnosus GG (CULTURELLE) 10-15 Billion cell capsule Take 1 capsule by mouth daily.       Family History   Problem Relation Age of Onset     Coronary artery disease Father      Stroke Maternal Grandfather      Stroke Paternal Grandfather         reports that she has never smoked. She has never used smokeless tobacco. She reports that she drinks  alcohol. She reports that she does not use illicit drugs.      Review of Systems  Pertinent items are noted in HPI.  A 12 point comprehensive review of systems was negative except as noted.      Objective:     Vitals:    08/29/18 0930   BP: 110/70   Pulse: 80   Resp: 20   Temp: 98.8  F (37.1  C)     There is no height or weight on file to calculate BMI.    EXAM:  GENERAL: This is a well-developed 43 y.o. female who appears her stated age  HEAD: normocephalic  HEENT: Pupils equal and reactive bilaterally  MOUTH: mucus membranes intact. Normal dentation  CARDIAC: RRR without murmur  CHEST/LUNG:  Clear to auscultation bilaterally  ABDOMEN: Soft, nontender, nondistended, no masses noted   NEUROLOGIC: Focally intact, nonfocal, alert and oriented x 3  INTEGUMENT: No open lesions or ulcers  VASCULAR: Pulses intact, symmetrical upper and lower extremities. There areskin changes consistent with chronic venous insufficiency. Varicose veins present in bilateral greater saphenous distribution. Spider veins present bilateral.    Imaging:  pending    Hardy Chatman MD  [facility] Surgery Dept.

## 2021-06-20 NOTE — PROGRESS NOTES
Bilateral symp VV and phlebitis noted. US TBA. Patient has had work up at [facility] for renal/spleen. Records called for release signed. Started compression 2 weeks ago.

## 2021-06-20 NOTE — PROGRESS NOTES
Us DONE TODAY AND REVIEWED RESULTS. PLAN TO RETURN AFTER 2 MONTHS OF COMPRESSION. No RFA option/sclerotherapy reviewed.

## 2021-06-20 NOTE — PROGRESS NOTES
This is a new consult for Varicose Veins. The patient has varicose veins that are problematic in bilateral legs. Symptoms patient has been experiencing are aching,  itching, tiredness, discoloration and  swelling. Patient has been wearing compression stockings.   Has been having charley horse pains at night.

## 2021-06-22 NOTE — PROGRESS NOTES
Follow Up: Varicose Veins/ Venous Insufficiency    Velma Granados is a 43 y.o.  female here for followup. She has worn stockings now for 3 months. I saw them previously in August 2018.  Continued progression of disease and symptoms and issues; reviewed ultrasound results. Patient has ongoing symptoms with pain and swelling needing intervention with pain meds secondary to interfering with daily activities and work. This now inhibits daily chores and activities.     Allergies:Sulfa (sulfonamide antibiotics)    Past Medical History:   Diagnosis Date     Renal infarct (H)        Past Surgical History:   Procedure Laterality Date     UT REMOVAL GALLBLADDER      Description: Cholecystectomy;  Recorded: 04/06/2011;       CURRENT MEDS:  Current Outpatient Medications on File Prior to Visit   Medication Sig Dispense Refill     amitriptyline (ELAVIL) 10 MG tablet Take 10 mg by mouth at bedtime as needed for sleep.       aspirin 81 mg chewable tablet Chew 81 mg daily.       isometheptene-acetaminophen-dichloralphenazone (MIDRIN) -325 mg per capsule Take 1 capsule by mouth 4 (four) times a day as needed.       Lactobacillus rhamnosus GG (CULTURELLE) 10-15 Billion cell capsule Take 1 capsule by mouth daily.       No current facility-administered medications on file prior to visit.        Family History   Problem Relation Age of Onset     Coronary artery disease Father      Stroke Maternal Grandfather      Stroke Paternal Grandfather         reports that  has never smoked. she has never used smokeless tobacco. She reports that she drinks alcohol. She reports that she does not use drugs.    Review of Systems:  Negative except leg pain, swelling and increase in symptoms. Issues now with daily chores especially ones where she stands a lot. Otherwise twelve system of review is negative.      OBJECTIVE:  Vitals:    12/05/18 1110   BP: 90/60   Pulse: 72   Resp: 12   Temp: 98.9  F (37.2  C)   TempSrc: Oral   Weight: 139  "lb 6.4 oz (63.2 kg)   Height: 5' 7\" (1.702 m)     Body mass index is 21.83 kg/m .    EXAM:  GENERAL: This is a well-developed 43 y.o. female who appears her stated age  HEAD: normocephalic  HEENT: Pupils equal and reactive bilaterally  CARDIAC: RRR without murmur  CHEST/LUNG:  Clear to auscultation  ABDOMEN: Soft, nontender, nondistended, no masses    NEUROLOGIC: Focally intact, nonfocal  VASCULAR: Pulses intact, symmetrical upper and lower extremities. Varicose veins, Spider veins and Chronic venous stasis changes, bilateral                Imaging:    US Venous Insufficiency Legs Bilateral (Order [id number])   Imaging   Date: 9/11/2018 Department: [facility] Ultrasound [place] Released By: Сергей Che Authorizing: Hardy Chatman MD   Study Result     [facility] OUTPATIENT  9/11/2018 8:19 AM     EXAM: BILATERAL LOWER EXTREMITY DEEP AND SUPERFICIAL VENOUS DUPLEX ULTRASOUND WITH PHYSIOLOGIC TESTING     INDICATION: Symptomatic varicose veins. Assess for incompetent veins.     TECHNIQUE: Supine and upright ultrasound of the deep and superficial veins with Valsalva and compression augmentation maneuvers. Duplex imaging is performed utilizing gray-scale, two-dimensional images, color-flow imaging, Doppler waveform analysis, and   spectral Doppler imaging.      INCOMPETENCY CRITERIA: Deep vein reflux reported when greater than 1,000 ms flow reversal.  Superficial vein reflux reported when greater than 500 ms flow reversal.  vein reflux reported as greater than 350 ms flow reversal.     DEEP VEIN FINDINGS:     RIGHT LEG: The common femoral, profunda femoral, femoral, popliteal, and visualized calf veins are patent and compressible.     LEFT LEG:  The common femoral, profunda femoral, femoral, popliteal, and visualized calf veins are patent and compressible.      RIGHT SUPERFICIAL VEIN FINDINGS:  GREAT SAPHENOUS VEIN: Competent from the saphenofemoral junction to the proximal thigh. " Incompetent at the knee and mid calf. The vein measures 4 mm at the knee and 4 mm at the mid calf.     SMALL SAPHENOUS VEIN: Competent from the saphenopopliteal junction to the mid calf.     No incompetent perforating veins or abnormal accessory veins identified.     LEFT SUPERFICIAL VEIN FINDINGS:  GREAT SAPHENOUS VEIN: Competent from the saphenofemoral junction to the mid calf.     SMALL SAPHENOUS VEIN: Competent from the saphenopopliteal junction to the mid calf.     No incompetent perforating veins or abnormal accessory veins identified.     IMPRESSION:   CONCLUSION:   1.  No deep venous thrombosis of either lower extremity.  2.  RIGHT LEG: The right superficial venous system is patent, with incompetence of the greater saphenous vein at the knee and mid calf.  3.  LEFT LEG: The left superficial venous system is patent, without incompetence.         Assessment/Plan:    Symptomatic varicose veins bilateral legs.  With these findings and issues she is a candidate for stab avulsions of these vessels.  We discussed the risks and benefits the procedure the risk of infection bleeding clotting recurrence DVTs poor cosmetic result and nerve injury and damage were all discussed and answered questions.  She like to consider this and would like this to set this to insurance for possible approval.    This is now affecting her daily life and activities and seems to be progressing even though she wears compression socks so she like to do something about it and be proactive if possible.    Hardy Chatman MD  [facility] Surgery Dept.

## 2021-06-22 NOTE — PROGRESS NOTES
Surgery/Procedure: Stab Phlebectomy varicose vein bilateral     Special Equipment: to be determined     [place] [facility] [place]: 04/26/19    Time: 7am     Surgeon: Dr. Chatman    Assist: no    Length of Surgery: 85 min     OR Confirmed/ :  Yes with zoa  on 1/8/19    Orders In:  Yes    Provider Team Notified:  Yes    Entered on [employer] / [employer] Calendar:  Yes    Post Op: 05/14/19 @ 820 mw

## 2021-06-22 NOTE — PROGRESS NOTES
3 month socksdone with continued sympvv. Right thigh and left thighs /photos done/no RFA options. Reviewed option of stab phlebectomy has pref one insurance.Reviewed need for pre op hand P.

## 2021-06-25 ENCOUNTER — RECORDS - HEALTHEAST (OUTPATIENT)
Dept: LAB | Facility: CLINIC | Age: 46
End: 2021-06-25

## 2021-06-25 LAB
ALBUMIN SERPL-MCNC: 4.3 G/DL (ref 3.5–5)
ALP SERPL-CCNC: 46 U/L (ref 45–120)
ALT SERPL W P-5'-P-CCNC: 15 U/L (ref 0–45)
ANION GAP SERPL CALCULATED.3IONS-SCNC: 10 MMOL/L (ref 5–18)
AST SERPL W P-5'-P-CCNC: 15 U/L (ref 0–40)
BILIRUB SERPL-MCNC: 0.4 MG/DL (ref 0–1)
BUN SERPL-MCNC: 11 MG/DL (ref 8–22)
CALCIUM SERPL-MCNC: 9.1 MG/DL (ref 8.5–10.5)
CHLORIDE BLD-SCNC: 105 MMOL/L (ref 98–107)
CO2 SERPL-SCNC: 24 MMOL/L (ref 22–31)
CREAT SERPL-MCNC: 0.69 MG/DL (ref 0.6–1.1)
GFR SERPL CREATININE-BSD FRML MDRD: >60 ML/MIN/1.73M2
GLUCOSE BLD-MCNC: 98 MG/DL (ref 70–125)
POTASSIUM BLD-SCNC: 4.2 MMOL/L (ref 3.5–5)
PROT SERPL-MCNC: 7.1 G/DL (ref 6–8)
SODIUM SERPL-SCNC: 139 MMOL/L (ref 136–145)
TSH SERPL DL<=0.005 MIU/L-ACNC: 0.91 UIU/ML (ref 0.3–5)

## 2021-09-18 ENCOUNTER — HEALTH MAINTENANCE LETTER (OUTPATIENT)
Age: 46
End: 2021-09-18

## 2021-09-21 DIAGNOSIS — K57.30 SEGMENTAL COLITIS ASSOCIATED WITH DIVERTICULOSIS (H): ICD-10-CM

## 2021-09-21 DIAGNOSIS — K50.10 SEGMENTAL COLITIS ASSOCIATED WITH DIVERTICULOSIS (H): ICD-10-CM

## 2021-09-22 ENCOUNTER — TELEPHONE (OUTPATIENT)
Dept: GASTROENTEROLOGY | Facility: CLINIC | Age: 46
End: 2021-09-22

## 2021-09-22 RX ORDER — MESALAMINE 1.2 G/1
4800 TABLET, DELAYED RELEASE ORAL DAILY
Qty: 360 TABLET | Refills: 0 | Status: SHIPPED | OUTPATIENT
Start: 2021-09-22 | End: 2022-03-24

## 2021-09-22 NOTE — TELEPHONE ENCOUNTER
Last Clinic Visit: 12/30/20 recommended 3 month follow up, no upcoming appointments scheduled.  90 day refill provided, routed to clinic scheduling for follow up.  Creatinine in care everywhere 6/2021 within limits

## 2021-09-22 NOTE — TELEPHONE ENCOUNTER
Called pt to schedule a virtual follow up with Dr. Molina for next avail    Left CC # and sent myc.

## 2021-12-20 ENCOUNTER — IMMUNIZATION (OUTPATIENT)
Dept: NURSING | Facility: CLINIC | Age: 46
End: 2021-12-20
Payer: COMMERCIAL

## 2021-12-20 PROCEDURE — 91300 PR COVID VAC PFIZER DIL RECON 30 MCG/0.3 ML IM: CPT

## 2021-12-20 PROCEDURE — 0004A PR COVID VAC PFIZER DIL RECON 30 MCG/0.3 ML IM: CPT

## 2022-01-08 ENCOUNTER — HEALTH MAINTENANCE LETTER (OUTPATIENT)
Age: 47
End: 2022-01-08

## 2022-02-16 DIAGNOSIS — K50.10 SEGMENTAL COLITIS ASSOCIATED WITH DIVERTICULOSIS (H): ICD-10-CM

## 2022-02-16 DIAGNOSIS — K57.30 SEGMENTAL COLITIS ASSOCIATED WITH DIVERTICULOSIS (H): ICD-10-CM

## 2022-02-19 NOTE — TELEPHONE ENCOUNTER
mesalamine (ROWASA) 4 g enema  Last Written Prescription Date:   9/22/2021  Last Fill Quantity: 360,   # refills: 0  Last Office Visit :  12/30/2020  Future Office visit:  None     Routing refill request to provider for review/approval because:  Second Request  Over due office visit and CBC Labs  Refer to clinic for review     Recent Labs   Lab Test 03/18/19  1652   WBC 10.7   RBC 3.94   HGB 11.4*   HCT 35.1          Olesya Funk RN  Central Triage Red Flags/Med Refills

## 2022-02-21 DIAGNOSIS — K57.30 SEGMENTAL COLITIS ASSOCIATED WITH DIVERTICULOSIS (H): ICD-10-CM

## 2022-02-21 DIAGNOSIS — K50.10 SEGMENTAL COLITIS ASSOCIATED WITH DIVERTICULOSIS (H): ICD-10-CM

## 2022-02-22 RX ORDER — MESALAMINE 4 G/60ML
4 SUSPENSION RECTAL AT BEDTIME
Qty: 90 ENEMA | Refills: 0 | Status: SHIPPED | OUTPATIENT
Start: 2022-02-22 | End: 2022-03-24

## 2022-02-22 RX ORDER — HYDROCORTISONE 100 MG/60ML
100 SUSPENSION RECTAL AT BEDTIME
Qty: 30 ENEMA | Refills: 0 | Status: SHIPPED | OUTPATIENT
Start: 2022-02-22 | End: 2023-12-05

## 2022-02-22 NOTE — TELEPHONE ENCOUNTER
hydrocortisone (CORTENEMA) 100 MG/60ML enema  Place 1 enema rectally At Bedtime - Rectal  Last Written Prescription Date:  1/11/2021  Last Fill Quantity: 30enema,   # refills: 3  Last Office Visit : 12/30/20  Future Office visit:  3/24/22    Refilled through next appt.

## 2022-02-27 ENCOUNTER — HEALTH MAINTENANCE LETTER (OUTPATIENT)
Age: 47
End: 2022-02-27

## 2022-03-24 ENCOUNTER — VIRTUAL VISIT (OUTPATIENT)
Dept: GASTROENTEROLOGY | Facility: CLINIC | Age: 47
End: 2022-03-24
Payer: COMMERCIAL

## 2022-03-24 DIAGNOSIS — K57.30 SEGMENTAL COLITIS ASSOCIATED WITH DIVERTICULOSIS (H): ICD-10-CM

## 2022-03-24 DIAGNOSIS — K50.10 SEGMENTAL COLITIS ASSOCIATED WITH DIVERTICULOSIS (H): ICD-10-CM

## 2022-03-24 PROCEDURE — 99214 OFFICE O/P EST MOD 30 MIN: CPT | Mod: 95 | Performed by: PHYSICIAN ASSISTANT

## 2022-03-24 RX ORDER — MESALAMINE 1.2 G/1
4800 TABLET, DELAYED RELEASE ORAL DAILY
Qty: 360 TABLET | Refills: 3 | Status: SHIPPED | OUTPATIENT
Start: 2022-03-24 | End: 2023-12-21

## 2022-03-24 RX ORDER — MESALAMINE 4 G/60ML
4 SUSPENSION RECTAL AT BEDTIME
Qty: 90 ENEMA | Refills: 1 | Status: SHIPPED | OUTPATIENT
Start: 2022-03-24 | End: 2023-03-29

## 2022-03-24 NOTE — LETTER
Date:April 1, 2022      Patient was self referred, no letter generated. Do not send.        [facility] Health Information

## 2022-03-24 NOTE — LETTER
3/24/2022         RE: Velma Mcfarlane  [address] [id number]        Dear Colleague,    Thank you for referring your patient, Velma Mcfarlane, to the [facility] [place]. Please see a copy of my visit note below.    Velma is a 47 year old who is being evaluated via a billable video visit.      How would you like to obtain your AVS? MyChart  If the video visit is dropped, the invitation should be resent by: Send to e-mail at: [email or web address]  Will anyone else be joining your video visit? No      Video Start Time: 1055 AM  Video-Visit Details    Type of service:  Video Visit    Video End Time:11:09 AM    Originating Location (pt. Location): Home    Distant Location (provider location):  [facility] [place]     Platform used for Video Visit: [facility]     IBD CLINIC VISIT -- follow up    CHIEF COMPLAINT: SCAD evaluation and management    SCAD HISTORY  Age at diagnosis: 44  Extent of disease: SCAD, left sided rui-diverticular inflammation  Current SCAD medications: Lialda 4.8 G daily, prednisone 10 mg daily --> 5 mg on Friday    Prior SCAD Medications: Cipro/flagyl (helped)    DISEASE ASSESSMENT  Labs:  Recent Labs   Lab Test 06/19/19  1009 06/20/18  1611   CRP <2.9 0.6   SED 9 20     Endoscopic assessment:     8/2019 icscope showed Dee 2 colitis from 30 cm-40cm. Diverticulosis in the sigmoid. Rest of colon was normal.     PATH:  SPECIMEN(S):   A: Cecal biopsy   B: Colon biopsy, ascending   C: Colon biopsy, transverse   D: Colon biopsy, descending   E: Sigmoid colon biopsy   F: Rectal biopsy     FINAL DIAGNOSIS:   A. Cecum, biopsy:   Colonic mucosa with no pathologic abnormalities     B. Ascending Colon, Biopsy:   Colonic mucosa with no pathologic abnormalities     C. Transverse Colon, biopsy:   Colonic mucosa with no pathologic abnormalities     D. Descending Colon, biopsy:   Colonic mucosa  with no pathologic abnormalities     E. Sigmoid Colon, biopsy:   Colitis with crypt injury, moderately active, with rectum sparing (see   part F below); consistent with SCAD   (segmental colitisassociated diverticulosis); negative for dysplasia     F. Rectum, biopsy:   Rectal mucosa with no pathologic abnormalities     EGD 4/15/19 Normal, normal biopsies of esophagus, stomach and duodenum  3/25/19 colonoscopy for rectal bleeding: left sided diverticular with edema, erythema, loss of vascular pattern noted in 2 segments (40-36 cm and 30-22 cm from the anus.    CT ab/pelvis 3/5/19:  Renal hypodensities stable from 4/16/19 exam, 2 mm calculus in right kidney, no gastric or small bowel abnormality  Fecal calprotectin: pending  C diff: negative per OSH records      ASSESSMENT/PLAN  Mrs. Salter is a 47 year old woman with history of cholecystectomy, 3 uncomplicated vaginal deliveries, thyroid cysts (per patient), and segmental colitis associated with diverticular disease who is following up for management of her disease. She is doing well on her current regimen, due for labs.    We will proceed with the following plan:     Plan:  --- Continue lialda 4.8g daily  --- Continue rowasa enemas   --- Fecal calprotectin to trend  --- Omeprazole for epigastric discomfort for possible GERD/dyspepsia   --- Labs due (CBC, LFTs, Creatinine)    Return to clinic in 12 months      Gerhard Nicolas PA-C  Division of Gastroenterology, Hepatology and Nutrition  [facility]       HPI:   Mrs. Salter is a 47 year old woman with history of cholecystectomy, 3 uncomplicated vaginal deliveries, thyroid cysts (per patient), and recent diagnosis of segmental colitis associated with diverticular disease who is establishing care for management of her disease.      Per chart:  Patient seen about 2014 at [facility] for diverticulitis and last seen 5/2019 for follow up of SCAD and nausea.  Patient had subtle symptoms of Patient had colonoscopy  "3/2019 with finding of edema, erythema, loss of and loss of vascular patter in 2 segments of left colon with mild to moderate diverticulosis.  Sigmoid biopsies showed mildly active chronic colitis consistent with SCAD.  Patient was treated with 10 day course of cipro/flagyl with improvement in symptoms followed by Lialda 4.8 G daily with possible improvement. On 4/11/19 the patient was found to be c.diff negative.      At time of consultation, patient reported frustation with lack of symptom improvement and communication at prior GI providers office.  Here to establish care.  Patient reports 5 years of alternating diarrhea and constipation along with occassional undigested food in stool and borrygymi.  She reports had initial improvement in symptoms following cipro/flagyl and mesalamine but called in as still was having blood streaked stools for which she was started on rowasa enemas.  She is concerned she is still flaring despite the therapies and putting herself on a low residue \"simple carbohydrate diet\" supplemented with \"high protein predigested cancer shakes\".  She reports weight loss in the setting of biking 12 miles daily and changed diet.      At time of consultation the patient reports two semiformed stools daily with blood streaks in all stools (10% blood) and with wiping.  She notes one day a week she will have bloating, increased flatus, general malaise, 5-6 watery BM's along with no associated abdominal pain.      Patient also reports 10 years of chronic food getting stuck in her esophagus until drinks some water with the food.  It doesn't happen with draper, but rather typically with meats.  She has never had a food impaction.  Happens with every meal.  No odynophagia, no nausea currently, no vomiting, rare reflux/heartburn. PPI not helpful in the past.  Reports was having nausea but improved with simple carbohydrate diet.  Reports when was nauseated, it was typically in the morning and improved with " a protein breakfast sandwich.    Prior report of dysphagia and nausea,  Had upper endoscopy 2019, 1 gastric polyp (fundic gland).  Separate mid/distal esophageal, gastric and duodenal biopsies negative.    Patient notes Hx of thyroid cysts and borderline TSH levels.       Social hx   -no smoking  -1-2 x month nsaids  -rare alcohol   -no illicit  -Bike 12 miles a day, weights    Fam Hx  Mother with history colitis - no issues in years, colon polyps  Maternal grandfather  of colon cancer at age 76  Both parents have diverticulitis    Interval history, 2019  In August, started cipro x 2 weeks, flagyl x 2 weeks, lialda and prednisone. Notices increased pain when delays Lialda by a few hours (increased pain). Now down to prednisone 10 mg daily, going to 5 mg daily on Friday. Feels much improved. Blood has stopped; left sided pain has improved. Gained 5 lb since August. Continues on SCD; met with Rebekah Browne.    No upcoming travel plans.     Interval history, 2019  Continues on SCD, which has helped a lot.     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   Off prednisone x 3 weeks. Continues on Cortenema daily x 2 months.   Also on Lialda 4.8 g daily.     Having 2 BMs per day (non-bloody, well-formed). No blood in stool since August.   Gained a few lb since Sep.     Interval history, 2020 (virtual visit)  Got sick in Dec with wheezing and was dx with walking PNA . Treated with abx and then prednisone x 5 days.   Continues on 5 mg prednisone daily and has difficulty weaning off it due to recurrent abd pain. Has been on prednisone 5 mg x 1 month.     Having 2 BMs per day, non bloody, loose.     For SCAD, currently on Lialda 4.8g and Rowasa enemas.     Interval history, 2020 (virtual visit)  In beginning of December, had a flare in the setting of stress, with increased bowel frequency and LLQ abd pain. Started on Rowasa enemas with significant help. But around  ate a piece of cake  and developed postprandial nausea and epigastric discomfort.      Continues on Lialda 4.8g daily and daily Rowasa enemas.     Currently having 2 BMs per day, soft. No blood.     EGD in 4/2019 was normal.     Interval history, 3/24/22  She has continued with SCD (since Aug 2019) and feels that symptoms are quite stable. She continues with lialda 4.8g daily and rowasa enemas every other night. Currently having 2 stools per day, formed no blood. No abdominal pain. If she deviates from her diet, then she may have some deviation from her baseline, but overall very stable.     She has bad migraines and is currently has one today.    ROS:    No fevers or chills  No weight loss  No blurry vision, double vision or change in vision  No sore throat  No lymphadenopathy  + headache (chronic migraines, takes Butalbital-acetaminophen-caffeine)  No shortness of breath or wheezing  No chest pain or pressure  No arthralgias or myalgias  No rashes or skin changes  Yes dysphagia  Yes BRBPR, hematochezia  No dysuria, frequency or urgency  No hot/cold intolerance or polyria  No anxiety or depression    Extra intestinal manifestations of IBD:  No uveitis/episcleritis  No aphthous ulcers   No arthritis   No erythema nodosum/pyoderma gangrenosum.     PERTINENT PAST MEDICAL HISTORY:  None     PREVIOUS SURGERIES:  Past Surgical History:   Procedure Laterality Date     CHOLECYSTECTOMY       COLONOSCOPY       COLONOSCOPY N/A 8/12/2019    Procedure: COLONOSCOPY, WITH POLYPECTOMY AND BIOPSY;  Surgeon: Yolanda Molina MD;  Location: [facility] OR     HC REMOVAL GALLBLADDER      Description: Cholecystectomy;  Recorded: 04/06/2011;     Surgical hx  -Cholecystectomy 2010  -3 children - vaginal x 3, Bladder sling placed 2016    PREVIOUS ENDOSCOPY:  See above    ALLERGIES:     Allergies   Allergen Reactions     Sulfa Drugs Hives     Other reaction(s): *Unknown       PERTINENT MEDICATIONS:    Current Outpatient Medications:      ACIDOPHILUS LACTOBACILLUS PO,  Take 1 capsule by mouth, Disp: , Rfl:      albuterol (PROAIR HFA/PROVENTIL HFA/VENTOLIN HFA) 108 (90 Base) MCG/ACT inhaler, , Disp: , Rfl:      amitriptyline (ELAVIL) 10 MG tablet, Take 10 mg by mouth, Disp: , Rfl:      budesonide (UCERIS) 9 MG 24 hr tablet, Take 1 tablet (9 mg) by mouth every morning For 30 days, then every other day for two weeks then stop (Patient not taking: Reported on 12/30/2020), Disp: 37 tablet, Rfl: 0     butalbital-acetaminophen-caffeine (FIORICET/ESGIC) -40 MG tablet, Take 1 tablet by mouth, Disp: , Rfl:      hydrocortisone (CORTENEMA) 100 MG/60ML enema, Place 1 enema rectally At Bedtime, Disp: 30 enema, Rfl: 0     mesalamine (LIALDA) 1.2 g EC tablet, Take 4 tablets (4,800 mg) by mouth daily For additional refills, please schedule a follow-up appointment at [phone], Disp: 360 tablet, Rfl: 0     mesalamine (ROWASA) 4 g enema, Place 1 enema (4 g) rectally At Bedtime, Disp: 90 enema, Rfl: 0     Multiple Vitamins-Iron (MULTIVITAMIN/IRON PO), Take 1 tablet by mouth, Disp: , Rfl:      omeprazole (PRILOSEC) 40 MG DR capsule, Take 1 capsule (40 mg) by mouth daily, Disp: 90 capsule, Rfl: 1     predniSONE (DELTASONE) 5 MG tablet, Take 20 mg (4 tablets)  for three days, then decrease by 5 mg (1 tablet) every three days until off., Disp: 45 tablet, Rfl: 0     predniSONE (DELTASONE) 5 MG tablet, Take 40 mg po for one week, then decrease by one tablet a week until off. (Patient not taking: Reported on 12/30/2020), Disp: 320 tablet, Rfl: 1    SOCIAL HISTORY:  Social History     Socioeconomic History     Marital status:      Spouse name: Not on file     Number of children: Not on file     Years of education: Not on file     Highest education level: Not on file   Occupational History     Not on file   Tobacco Use     Smoking status: Never Smoker     Smokeless tobacco: Never Used   Substance and Sexual Activity     Alcohol use: Yes     Comment: occasional     Drug use: Never     Sexual  activity: Not on file   Other Topics Concern     Parent/sibling w/ CABG, MI or angioplasty before 65F 55M? Not Asked   Social History Narrative     Not on file     Social Determinants of Health     Financial Resource Strain: Not on file   Food Insecurity: Not on file   Transportation Needs: Not on file   Physical Activity: Not on file   Stress: Not on file   Social Connections: Not on file   Intimate Partner Violence: Not on file   Housing Stability: Not on file     FAMILY HISTORY:  Family History   Problem Relation Age of Onset     Diverticulitis Father      Inflammatory Bowel Disease No family hx of      Colon Cancer No family hx of      Coronary Artery Disease Father      Cerebrovascular Disease Maternal Grandfather      Cerebrovascular Disease Paternal Grandfather        PHYSICAL EXAMINATION:  Constitutional: aaox3, cooperative, pleasant, not dyspneic/diaphoretic, no acute distress  Vitals reviewed: There were no vitals taken for this visit.  Wt:   Wt Readings from Last 2 Encounters:   12/30/20 61.2 kg (135 lb)   09/18/19 61.6 kg (135 lb 11.2 oz)      General appearance  Healthy appearing adult, in no acute distress     Eyes  Sclera anicteric  Pupils round and reactive to light     Ears, nose, mouth and throat  No obvious external lesions of ears and nose  Hearing intact     Neck  Symmetric  No obvious external lesions     Respiratory  Normal respiration, no use of accessory muscles      MSK  Gait normal     Skin  No rashes or jaundice      Psychiatric  Oriented to person, place and time  Appropriate mood and affect.     PERTINENT STUDIES:    Most recent hepatic panel:  Recent Labs   Lab Test 06/25/21  1229 07/22/19  0000   ALT 15 16   AST 15 19     Most recent creatinine:  Recent Labs   Lab Test 06/25/21  1229 07/22/19  0000   CR 0.69 0.72                     Again, thank you for allowing me to participate in the care of your patient.        Sincerely,        Gerhard Nicolas PA-C

## 2022-03-24 NOTE — PROGRESS NOTES
Velma is a 47 year old who is being evaluated via a billable video visit.      How would you like to obtain your AVS? MyChart  If the video visit is dropped, the invitation should be resent by: Send to e-mail at: [email or web address]  Will anyone else be joining your video visit? No      Video Start Time: 1055 AM  Video-Visit Details    Type of service:  Video Visit    Video End Time:11:09 AM    Originating Location (pt. Location): Home    Distant Location (provider location):  [facility]     Platform used for Video Visit: [facility]     IBD CLINIC VISIT -- follow up    CHIEF COMPLAINT: SCAD evaluation and management    SCAD HISTORY  Age at diagnosis: 44  Extent of disease: SCAD, left sided rui-diverticular inflammation  Current SCAD medications: Lialda 4.8 G daily, prednisone 10 mg daily --> 5 mg on Friday    Prior SCAD Medications: Cipro/flagyl (helped)    DISEASE ASSESSMENT  Labs:  Recent Labs   Lab Test 06/19/19  1009 06/20/18  1611   CRP <2.9 0.6   SED 9 20     Endoscopic assessment:     8/2019 icscope showed Dee 2 colitis from 30 cm-40cm. Diverticulosis in the sigmoid. Rest of colon was normal.     PATH:  SPECIMEN(S):   A: Cecal biopsy   B: Colon biopsy, ascending   C: Colon biopsy, transverse   D: Colon biopsy, descending   E: Sigmoid colon biopsy   F: Rectal biopsy     FINAL DIAGNOSIS:   A. Cecum, biopsy:   Colonic mucosa with no pathologic abnormalities     B. Ascending Colon, Biopsy:   Colonic mucosa with no pathologic abnormalities     C. Transverse Colon, biopsy:   Colonic mucosa with no pathologic abnormalities     D. Descending Colon, biopsy:   Colonic mucosa with no pathologic abnormalities     E. Sigmoid Colon, biopsy:   Colitis with crypt injury, moderately active, with rectum sparing (see   part F below); consistent with SCAD   (segmental colitisassociated diverticulosis); negative for dysplasia     F. Rectum, biopsy:   Rectal mucosa with no  pathologic abnormalities     EGD 4/15/19 Normal, normal biopsies of esophagus, stomach and duodenum  3/25/19 colonoscopy for rectal bleeding: left sided diverticular with edema, erythema, loss of vascular pattern noted in 2 segments (40-36 cm and 30-22 cm from the anus.    CT ab/pelvis 3/5/19:  Renal hypodensities stable from 4/16/19 exam, 2 mm calculus in right kidney, no gastric or small bowel abnormality  Fecal calprotectin: pending  C diff: negative per OSH records      ASSESSMENT/PLAN  Mrs. Salter is a 47 year old woman with history of cholecystectomy, 3 uncomplicated vaginal deliveries, thyroid cysts (per patient), and segmental colitis associated with diverticular disease who is following up for management of her disease. She is doing well on her current regimen, due for labs.    We will proceed with the following plan:     Plan:  --- Continue lialda 4.8g daily  --- Continue rowasa enemas   --- Fecal calprotectin to trend  --- Omeprazole for epigastric discomfort for possible GERD/dyspepsia   --- Labs due (CBC, LFTs, Creatinine)    Return to clinic in 12 months      Gerhard Nicolas PA-C  Division of Gastroenterology, Hepatology and Nutrition  [facility]       HPI:   Mrs. Salter is a 47 year old woman with history of cholecystectomy, 3 uncomplicated vaginal deliveries, thyroid cysts (per patient), and recent diagnosis of segmental colitis associated with diverticular disease who is establishing care for management of her disease.      Per chart:  Patient seen about 2014 at [facility] for diverticulitis and last seen 5/2019 for follow up of SCAD and nausea.  Patient had subtle symptoms of Patient had colonoscopy 3/2019 with finding of edema, erythema, loss of and loss of vascular patter in 2 segments of left colon with mild to moderate diverticulosis.  Sigmoid biopsies showed mildly active chronic colitis consistent with SCAD.  Patient was treated with 10 day course of cipro/flagyl with improvement in  "symptoms followed by Heather 4.8 G daily with possible improvement. On 4/11/19 the patient was found to be c.diff negative.      At time of consultation, patient reported frustation with lack of symptom improvement and communication at prior GI providers office.  Here to establish care.  Patient reports 5 years of alternating diarrhea and constipation along with occassional undigested food in stool and borrygymi.  She reports had initial improvement in symptoms following cipro/flagyl and mesalamine but called in as still was having blood streaked stools for which she was started on rowasa enemas.  She is concerned she is still flaring despite the therapies and putting herself on a low residue \"simple carbohydrate diet\" supplemented with \"high protein predigested cancer shakes\".  She reports weight loss in the setting of biking 12 miles daily and changed diet.      At time of consultation the patient reports two semiformed stools daily with blood streaks in all stools (10% blood) and with wiping.  She notes one day a week she will have bloating, increased flatus, general malaise, 5-6 watery BM's along with no associated abdominal pain.      Patient also reports 10 years of chronic food getting stuck in her esophagus until drinks some water with the food.  It doesn't happen with draper, but rather typically with meats.  She has never had a food impaction.  Happens with every meal.  No odynophagia, no nausea currently, no vomiting, rare reflux/heartburn. PPI not helpful in the past.  Reports was having nausea but improved with simple carbohydrate diet.  Reports when was nauseated, it was typically in the morning and improved with a protein breakfast sandwich.    Prior report of dysphagia and nausea,  Had upper endoscopy April 15th 2019, 1 gastric polyp (fundic gland).  Separate mid/distal esophageal, gastric and duodenal biopsies negative.    Patient notes Hx of thyroid cysts and borderline TSH levels.       Social hx "   -no smoking  -1-2 x month nsaids  -rare alcohol   -no illicit  -Bike 12 miles a day, weights    Fam Hx  Mother with history colitis - no issues in years, colon polyps  Maternal grandfather  of colon cancer at age 76  Both parents have diverticulitis    Interval history, 2019  In August, started cipro x 2 weeks, flagyl x 2 weeks, lialda and prednisone. Notices increased pain when delays Lialda by a few hours (increased pain). Now down to prednisone 10 mg daily, going to 5 mg daily on Friday. Feels much improved. Blood has stopped; left sided pain has improved. Gained 5 lb since August. Continues on SCD; met with Rebekah Browne.    No upcoming travel plans.     Interval history, 2019  Continues on SCD, which has helped a lot.     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   Off prednisone x 3 weeks. Continues on Cortenema daily x 2 months.   Also on Lialda 4.8 g daily.     Having 2 BMs per day (non-bloody, well-formed). No blood in stool since August.   Gained a few lb since Sep.     Interval history, 2020 (virtual visit)  Got sick in Dec with wheezing and was dx with walking PNA . Treated with abx and then prednisone x 5 days.   Continues on 5 mg prednisone daily and has difficulty weaning off it due to recurrent abd pain. Has been on prednisone 5 mg x 1 month.     Having 2 BMs per day, non bloody, loose.     For SCAD, currently on Lialda 4.8g and Rowasa enemas.     Interval history, 2020 (virtual visit)  In beginning of December, had a flare in the setting of stress, with increased bowel frequency and LLQ abd pain. Started on Rowasa enemas with significant help. But around  ate a piece of cake and developed postprandial nausea and epigastric discomfort.      Continues on Lialda 4.8g daily and daily Rowasa enemas.     Currently having 2 BMs per day, soft. No blood.     EGD in 2019 was normal.     Interval history, 3/24/22  She has continued with SCD (since Aug 2019) and feels that  symptoms are quite stable. She continues with lialda 4.8g daily and rowasa enemas every other night. Currently having 2 stools per day, formed no blood. No abdominal pain. If she deviates from her diet, then she may have some deviation from her baseline, but overall very stable.     She has bad migraines and is currently has one today.    ROS:    No fevers or chills  No weight loss  No blurry vision, double vision or change in vision  No sore throat  No lymphadenopathy  + headache (chronic migraines, takes Butalbital-acetaminophen-caffeine)  No shortness of breath or wheezing  No chest pain or pressure  No arthralgias or myalgias  No rashes or skin changes  Yes dysphagia  Yes BRBPR, hematochezia  No dysuria, frequency or urgency  No hot/cold intolerance or polyria  No anxiety or depression    Extra intestinal manifestations of IBD:  No uveitis/episcleritis  No aphthous ulcers   No arthritis   No erythema nodosum/pyoderma gangrenosum.     PERTINENT PAST MEDICAL HISTORY:  None     PREVIOUS SURGERIES:  Past Surgical History:   Procedure Laterality Date     CHOLECYSTECTOMY       COLONOSCOPY       COLONOSCOPY N/A 8/12/2019    Procedure: COLONOSCOPY, WITH POLYPECTOMY AND BIOPSY;  Surgeon: Yolanda Molina MD;  Location: [facility] OR     HC REMOVAL GALLBLADDER      Description: Cholecystectomy;  Recorded: 04/06/2011;     Surgical hx  -Cholecystectomy 2010  -3 children - vaginal x 3, Bladder sling placed 2016    PREVIOUS ENDOSCOPY:  See above    ALLERGIES:     Allergies   Allergen Reactions     Sulfa Drugs Hives     Other reaction(s): *Unknown       PERTINENT MEDICATIONS:    Current Outpatient Medications:      ACIDOPHILUS LACTOBACILLUS PO, Take 1 capsule by mouth, Disp: , Rfl:      albuterol (PROAIR HFA/PROVENTIL HFA/VENTOLIN HFA) 108 (90 Base) MCG/ACT inhaler, , Disp: , Rfl:      amitriptyline (ELAVIL) 10 MG tablet, Take 10 mg by mouth, Disp: , Rfl:      budesonide (UCERIS) 9 MG 24 hr tablet, Take 1 tablet (9 mg) by mouth every  morning For 30 days, then every other day for two weeks then stop (Patient not taking: Reported on 12/30/2020), Disp: 37 tablet, Rfl: 0     butalbital-acetaminophen-caffeine (FIORICET/ESGIC) -40 MG tablet, Take 1 tablet by mouth, Disp: , Rfl:      hydrocortisone (CORTENEMA) 100 MG/60ML enema, Place 1 enema rectally At Bedtime, Disp: 30 enema, Rfl: 0     mesalamine (LIALDA) 1.2 g EC tablet, Take 4 tablets (4,800 mg) by mouth daily For additional refills, please schedule a follow-up appointment at [phone], Disp: 360 tablet, Rfl: 0     mesalamine (ROWASA) 4 g enema, Place 1 enema (4 g) rectally At Bedtime, Disp: 90 enema, Rfl: 0     Multiple Vitamins-Iron (MULTIVITAMIN/IRON PO), Take 1 tablet by mouth, Disp: , Rfl:      omeprazole (PRILOSEC) 40 MG DR capsule, Take 1 capsule (40 mg) by mouth daily, Disp: 90 capsule, Rfl: 1     predniSONE (DELTASONE) 5 MG tablet, Take 20 mg (4 tablets)  for three days, then decrease by 5 mg (1 tablet) every three days until off., Disp: 45 tablet, Rfl: 0     predniSONE (DELTASONE) 5 MG tablet, Take 40 mg po for one week, then decrease by one tablet a week until off. (Patient not taking: Reported on 12/30/2020), Disp: 320 tablet, Rfl: 1    SOCIAL HISTORY:  Social History     Socioeconomic History     Marital status:      Spouse name: Not on file     Number of children: Not on file     Years of education: Not on file     Highest education level: Not on file   Occupational History     Not on file   Tobacco Use     Smoking status: Never Smoker     Smokeless tobacco: Never Used   Substance and Sexual Activity     Alcohol use: Yes     Comment: occasional     Drug use: Never     Sexual activity: Not on file   Other Topics Concern     Parent/sibling w/ CABG, MI or angioplasty before 65F 55M? Not Asked   Social History Narrative     Not on file     Social Determinants of Health     Financial Resource Strain: Not on file   Food Insecurity: Not on file   Transportation Needs: Not  on file   Physical Activity: Not on file   Stress: Not on file   Social Connections: Not on file   Intimate Partner Violence: Not on file   Housing Stability: Not on file     FAMILY HISTORY:  Family History   Problem Relation Age of Onset     Diverticulitis Father      Inflammatory Bowel Disease No family hx of      Colon Cancer No family hx of      Coronary Artery Disease Father      Cerebrovascular Disease Maternal Grandfather      Cerebrovascular Disease Paternal Grandfather        PHYSICAL EXAMINATION:  Constitutional: aaox3, cooperative, pleasant, not dyspneic/diaphoretic, no acute distress  Vitals reviewed: There were no vitals taken for this visit.  Wt:   Wt Readings from Last 2 Encounters:   12/30/20 61.2 kg (135 lb)   09/18/19 61.6 kg (135 lb 11.2 oz)      General appearance  Healthy appearing adult, in no acute distress     Eyes  Sclera anicteric  Pupils round and reactive to light     Ears, nose, mouth and throat  No obvious external lesions of ears and nose  Hearing intact     Neck  Symmetric  No obvious external lesions     Respiratory  Normal respiration, no use of accessory muscles      MSK  Gait normal     Skin  No rashes or jaundice      Psychiatric  Oriented to person, place and time  Appropriate mood and affect.     PERTINENT STUDIES:    Most recent hepatic panel:  Recent Labs   Lab Test 06/25/21  1229 07/22/19  0000   ALT 15 16   AST 15 19     Most recent creatinine:  Recent Labs   Lab Test 06/25/21  1229 07/22/19  0000   CR 0.69 0.72

## 2022-03-25 ENCOUNTER — TELEPHONE (OUTPATIENT)
Dept: GASTROENTEROLOGY | Facility: CLINIC | Age: 47
End: 2022-03-25
Payer: COMMERCIAL

## 2022-03-25 NOTE — TELEPHONE ENCOUNTER
Called to [facility] a year f/u with Pitzl 3/24/23

## 2022-07-06 ENCOUNTER — LAB REQUISITION (OUTPATIENT)
Dept: LAB | Facility: CLINIC | Age: 47
End: 2022-07-06

## 2022-07-06 DIAGNOSIS — E04.9 NONTOXIC GOITER, UNSPECIFIED: ICD-10-CM

## 2022-07-06 PROCEDURE — 84439 ASSAY OF FREE THYROXINE: CPT | Performed by: PHYSICIAN ASSISTANT

## 2022-07-06 PROCEDURE — 84443 ASSAY THYROID STIM HORMONE: CPT | Performed by: PHYSICIAN ASSISTANT

## 2022-07-06 PROCEDURE — 84481 FREE ASSAY (FT-3): CPT | Performed by: PHYSICIAN ASSISTANT

## 2022-07-07 LAB
T3FREE SERPL-MCNC: 2.4 PG/ML (ref 2–4.4)
T4 FREE SERPL-MCNC: 1.27 NG/DL (ref 0.9–1.7)
TSH SERPL DL<=0.005 MIU/L-ACNC: 0.96 UIU/ML (ref 0.3–4.2)

## 2022-10-24 ENCOUNTER — MYC MEDICAL ADVICE (OUTPATIENT)
Dept: GASTROENTEROLOGY | Facility: CLINIC | Age: 47
End: 2022-10-24

## 2022-10-25 NOTE — TELEPHONE ENCOUNTER
Called patient in response to YOOSEt message reporting worsening symptoms of SCAD. Scheduled an appointment with Gerhard Nicolas tomorrow at 7:00am to discuss further.

## 2022-10-26 ENCOUNTER — TELEPHONE (OUTPATIENT)
Dept: GASTROENTEROLOGY | Facility: CLINIC | Age: 47
End: 2022-10-26

## 2022-10-26 ENCOUNTER — VIRTUAL VISIT (OUTPATIENT)
Dept: GASTROENTEROLOGY | Facility: CLINIC | Age: 47
End: 2022-10-26
Payer: COMMERCIAL

## 2022-10-26 VITALS — BODY MASS INDEX: 20.72 KG/M2 | WEIGHT: 132 LBS | HEIGHT: 67 IN

## 2022-10-26 DIAGNOSIS — E71.312 SCAD (SHORT-CHAIN ACYL-COA DEHYDROGENASE DEFICIENCY) (H): Primary | ICD-10-CM

## 2022-10-26 PROCEDURE — 99214 OFFICE O/P EST MOD 30 MIN: CPT | Mod: 95 | Performed by: PHYSICIAN ASSISTANT

## 2022-10-26 ASSESSMENT — PAIN SCALES - GENERAL: PAINLEVEL: MODERATE PAIN (4)

## 2022-10-26 NOTE — PROGRESS NOTES
Velma is a 47 year old who is being evaluated via a billable video visit.      How would you like to obtain your AVS? MyChart  If the video visit is dropped, the invitation should be resent by: Text to cell phone: [phone]  Will anyone else be joining your video visit? No   No Bp taken        Video-Visit Details    Video Start Time: 7:15 AM    Type of service:  Video Visit    Video End Time:7:33 AM    Originating Location (pt. Location): Home        Distant Location (provider location):  Off-site    Platform used for Video Visit: [facility]     IBD CLINIC VISIT -- follow up    CHIEF COMPLAINT: SCAD evaluation and management    SCAD HISTORY  Age at diagnosis: 44  Extent of disease: SCAD, left sided rui-diverticular inflammation  Current SCAD medications: Lialda 4.8 G daily and rowasa    Prior SCAD Medications: Cipro/flagyl (helped)    DISEASE ASSESSMENT  Labs:  Recent Labs   Lab Test 06/19/19  1009 06/20/18  1611   CRP <2.9 0.6   SED 9 20     Endoscopic assessment:     8/2019 icscope showed Dee 2 colitis from 30 cm-40cm. Diverticulosis in the sigmoid. Rest of colon was normal.     PATH:  SPECIMEN(S):   A: Cecal biopsy   B: Colon biopsy, ascending   C: Colon biopsy, transverse   D: Colon biopsy, descending   E: Sigmoid colon biopsy   F: Rectal biopsy     FINAL DIAGNOSIS:   A. Cecum, biopsy:   Colonic mucosa with no pathologic abnormalities     B. Ascending Colon, Biopsy:   Colonic mucosa with no pathologic abnormalities     C. Transverse Colon, biopsy:   Colonic mucosa with no pathologic abnormalities     D. Descending Colon, biopsy:   Colonic mucosa with no pathologic abnormalities     E. Sigmoid Colon, biopsy:   Colitis with crypt injury, moderately active, with rectum sparing (see   part F below); consistent with SCAD   (segmental colitisassociated diverticulosis); negative for dysplasia     F. Rectum, biopsy:   Rectal mucosa with no pathologic abnormalities     EGD 4/15/19 Normal, normal biopsies of  esophagus, stomach and duodenum  3/25/19 colonoscopy for rectal bleeding: left sided diverticular with edema, erythema, loss of vascular pattern noted in 2 segments (40-36 cm and 30-22 cm from the anus.    CT ab/pelvis 3/5/19:  Renal hypodensities stable from 4/16/19 exam, 2 mm calculus in right kidney, no gastric or small bowel abnormality  Fecal calprotectin: pending  C diff: negative per OSH records      ASSESSMENT/PLAN  Mrs. Salter is a 47 year old woman with history of cholecystectomy, 3 uncomplicated vaginal deliveries, thyroid cysts (per patient), and segmental colitis associated with diverticular disease who is following up for management of her disease. Currently having some increase in symptoms over the last 6 months.  Stools remain formed, so unlikely an infectious process. Increased stress that is likely contributing.     We will proceed with the following plan:     Plan:  ------Fecal dakota: if elevated, would proceed with a flex sig   --- Start rifaximin: if too $$/not covered then will do cipro/flagyl.  If after 10 day course no improvement, then labs and flex sig  --- Continue lialda 4.8g daily  --- Continue rowasa enemas     Return to clinic in 6 months      Gerhard Nicolas PA-C  Division of Gastroenterology, Hepatology and Nutrition  [facility]       HPI:   Mrs. Salter is a 47 year old woman with history of cholecystectomy, 3 uncomplicated vaginal deliveries, thyroid cysts (per patient), and recent diagnosis of segmental colitis associated with diverticular disease who is establishing care for management of her disease.      Per chart:  Patient seen about 2014 at [facility] for diverticulitis and last seen 5/2019 for follow up of SCAD and nausea.  Patient had subtle symptoms of Patient had colonoscopy 3/2019 with finding of edema, erythema, loss of and loss of vascular patter in 2 segments of left colon with mild to moderate diverticulosis.  Sigmoid biopsies showed mildly active chronic  "colitis consistent with SCAD.  Patient was treated with 10 day course of cipro/flagyl with improvement in symptoms followed by Lialda 4.8 G daily with possible improvement. On 4/11/19 the patient was found to be c.diff negative.      At time of consultation, patient reported frustation with lack of symptom improvement and communication at prior GI providers office.  Here to establish care.  Patient reports 5 years of alternating diarrhea and constipation along with occassional undigested food in stool and borrygymi.  She reports had initial improvement in symptoms following cipro/flagyl and mesalamine but called in as still was having blood streaked stools for which she was started on rowasa enemas.  She is concerned she is still flaring despite the therapies and putting herself on a low residue \"simple carbohydrate diet\" supplemented with \"high protein predigested cancer shakes\".  She reports weight loss in the setting of biking 12 miles daily and changed diet.      At time of consultation the patient reports two semiformed stools daily with blood streaks in all stools (10% blood) and with wiping.  She notes one day a week she will have bloating, increased flatus, general malaise, 5-6 watery BM's along with no associated abdominal pain.      Patient also reports 10 years of chronic food getting stuck in her esophagus until drinks some water with the food.  It doesn't happen with draper, but rather typically with meats.  She has never had a food impaction.  Happens with every meal.  No odynophagia, no nausea currently, no vomiting, rare reflux/heartburn. PPI not helpful in the past.  Reports was having nausea but improved with simple carbohydrate diet.  Reports when was nauseated, it was typically in the morning and improved with a protein breakfast sandwich.    Prior report of dysphagia and nausea,  Had upper endoscopy April 15th 2019, 1 gastric polyp (fundic gland).  Separate mid/distal esophageal, gastric and " duodenal biopsies negative.    Patient notes Hx of thyroid cysts and borderline TSH levels.       Social hx   -no smoking  -1-2 x month nsaids  -rare alcohol   -no illicit  -Bike 12 miles a day, weights    Fam Hx  Mother with history colitis - no issues in years, colon polyps  Maternal grandfather  of colon cancer at age 76  Both parents have diverticulitis    Interval history, 2019  In August, started cipro x 2 weeks, flagyl x 2 weeks, lialda and prednisone. Notices increased pain when delays Lialda by a few hours (increased pain). Now down to prednisone 10 mg daily, going to 5 mg daily on Friday. Feels much improved. Blood has stopped; left sided pain has improved. Gained 5 lb since August. Continues on SCD; met with Rebekah Browne.    No upcoming travel plans.     Interval history, 2019  Continues on SCD, which has helped a lot.     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   Off prednisone x 3 weeks. Continues on Cortenema daily x 2 months.   Also on Lialda 4.8 g daily.     Having 2 BMs per day (non-bloody, well-formed). No blood in stool since August.   Gained a few lb since Sep.     Interval history, 2020 (virtual visit)  Got sick in Dec with wheezing and was dx with walking PNA . Treated with abx and then prednisone x 5 days.   Continues on 5 mg prednisone daily and has difficulty weaning off it due to recurrent abd pain. Has been on prednisone 5 mg x 1 month.     Having 2 BMs per day, non bloody, loose.     For SCAD, currently on Lialda 4.8g and Rowasa enemas.     Interval history, 2020 (virtual visit)  In beginning of December, had a flare in the setting of stress, with increased bowel frequency and LLQ abd pain. Started on Rowasa enemas with significant help. But around  ate a piece of cake and developed postprandial nausea and epigastric discomfort.      Continues on Lialda 4.8g daily and daily Rowasa enemas.     Currently having 2 BMs per day, soft. No blood.     EGD in  4/2019 was normal.     Interval history, 3/24/22  She has continued with SCD (since Aug 2019) and feels that symptoms are quite stable. She continues with lialda 4.8g daily and rowasa enemas every other night. Currently having 2 stools per day, formed no blood. No abdominal pain. If she deviates from her diet, then she may have some deviation from her baseline, but overall very stable.     She has bad migraines and is currently has one today.    Interval hx 10/26/22  Symptoms over the last 6 months. 1-3 stools per day, slightly looser than normal, variable consistency from more firm to looser stools.  No blood in the stool.  No urgency or nighttime stool.  She has been consistent and compliant with lialda and rowasa. Over the last 6 months she has had discomfort in lower left quadrant. Very tired, minimal appetite. Trying to minimize grains and sugar.    A lot of stress with father in law passing and coworker passing suddenly. Submitted message through portal to get rx for steroids and antibiotics which has worked well in the past.     ROS:    No fevers or chills  No weight loss  No blurry vision, double vision or change in vision  No sore throat  No lymphadenopathy  + headache (chronic migraines, takes Butalbital-acetaminophen-caffeine)  No shortness of breath or wheezing  No chest pain or pressure  No arthralgias or myalgias  No rashes or skin changes  No dysphagia  No BRBPR, hematochezia  No dysuria, frequency or urgency  No hot/cold intolerance or polyria  No anxiety or depression    Extra intestinal manifestations of IBD:  No uveitis/episcleritis  No aphthous ulcers   No arthritis   No erythema nodosum/pyoderma gangrenosum.     PERTINENT PAST MEDICAL HISTORY:  None     PREVIOUS SURGERIES:  Past Surgical History:   Procedure Laterality Date     CHOLECYSTECTOMY       COLONOSCOPY       COLONOSCOPY N/A 8/12/2019    Procedure: COLONOSCOPY, WITH POLYPECTOMY AND BIOPSY;  Surgeon: Yolanda Molina MD;  Location: [facility] OR      HC REMOVAL GALLBLADDER      Description: Cholecystectomy;  Recorded: 04/06/2011;     Surgical hx  -Cholecystectomy 2010  -3 children - vaginal x 3, Bladder sling placed 2016    PREVIOUS ENDOSCOPY:  See above    ALLERGIES:     Allergies   Allergen Reactions     Sulfa Drugs Hives     Other reaction(s): *Unknown       PERTINENT MEDICATIONS:    Current Outpatient Medications:      ACIDOPHILUS LACTOBACILLUS PO, Take 1 capsule by mouth, Disp: , Rfl:      albuterol (PROAIR HFA/PROVENTIL HFA/VENTOLIN HFA) 108 (90 Base) MCG/ACT inhaler, , Disp: , Rfl:      amitriptyline (ELAVIL) 10 MG tablet, Take 10 mg by mouth, Disp: , Rfl:      butalbital-acetaminophen-caffeine (FIORICET/ESGIC) -40 MG tablet, Take 1 tablet by mouth, Disp: , Rfl:      hydrocortisone (CORTENEMA) 100 MG/60ML enema, Place 1 enema rectally At Bedtime, Disp: 30 enema, Rfl: 0     mesalamine (LIALDA) 1.2 g DR tablet, Take 4 tablets (4,800 mg) by mouth daily, Disp: 360 tablet, Rfl: 3     mesalamine (ROWASA) 4 g enema, Place 1 enema (4 g) rectally At Bedtime, Disp: 90 enema, Rfl: 1     budesonide (UCERIS) 9 MG 24 hr tablet, Take 1 tablet (9 mg) by mouth every morning For 30 days, then every other day for two weeks then stop (Patient not taking: Reported on 12/30/2020), Disp: 37 tablet, Rfl: 0     Multiple Vitamins-Iron (MULTIVITAMIN/IRON PO), Take 1 tablet by mouth (Patient not taking: Reported on 10/26/2022), Disp: , Rfl:      omeprazole (PRILOSEC) 40 MG DR capsule, Take 1 capsule (40 mg) by mouth daily (Patient not taking: Reported on 10/26/2022), Disp: 90 capsule, Rfl: 1     predniSONE (DELTASONE) 5 MG tablet, Take 20 mg (4 tablets)  for three days, then decrease by 5 mg (1 tablet) every three days until off. (Patient not taking: Reported on 10/26/2022), Disp: 45 tablet, Rfl: 0     predniSONE (DELTASONE) 5 MG tablet, Take 40 mg po for one week, then decrease by one tablet a week until off. (Patient not taking: Reported on 12/30/2020), Disp: 320 tablet,  "Rfl: 1    SOCIAL HISTORY:  Social History     Socioeconomic History     Marital status:      Spouse name: Not on file     Number of children: Not on file     Years of education: Not on file     Highest education level: Not on file   Occupational History     Not on file   Tobacco Use     Smoking status: Never     Smokeless tobacco: Never   Substance and Sexual Activity     Alcohol use: Yes     Comment: occasional     Drug use: Never     Sexual activity: Not on file   Other Topics Concern     Parent/sibling w/ CABG, MI or angioplasty before 65F 55M? Not Asked   Social History Narrative     Not on file     Social Determinants of Health     Financial Resource Strain: Not on file   Food Insecurity: Not on file   Transportation Needs: Not on file   Physical Activity: Not on file   Stress: Not on file   Social Connections: Not on file   Intimate Partner Violence: Not on file   Housing Stability: Not on file     FAMILY HISTORY:  Family History   Problem Relation Age of Onset     Diverticulitis Father      Inflammatory Bowel Disease No family hx of      Colon Cancer No family hx of      Coronary Artery Disease Father      Cerebrovascular Disease Maternal Grandfather      Cerebrovascular Disease Paternal Grandfather        PHYSICAL EXAMINATION:  Constitutional: aaox3, cooperative, pleasant, not dyspneic/diaphoretic, no acute distress  Vitals reviewed: Ht 1.702 m (5' 7\")   Wt 59.9 kg (132 lb)   BMI 20.67 kg/m    Wt:   Wt Readings from Last 2 Encounters:   10/26/22 59.9 kg (132 lb)   12/30/20 61.2 kg (135 lb)      General appearance  Healthy appearing adult, in no acute distress     Eyes  Sclera anicteric  Pupils round and reactive to light     Ears, nose, mouth and throat  No obvious external lesions of ears and nose  Hearing intact     Neck  Symmetric  No obvious external lesions     Respiratory  Normal respiration, no use of accessory muscles      MSK  Gait normal     Skin  No rashes or jaundice "      Psychiatric  Oriented to person, place and time  Appropriate mood and affect.     PERTINENT STUDIES:    Most recent hepatic panel:  Recent Labs   Lab Test 06/25/21  1229 07/22/19  0000   ALT 15 16   AST 15 19     Most recent creatinine:  Recent Labs   Lab Test 06/25/21  1229 07/22/19  0000   CR 0.69 0.72

## 2022-10-26 NOTE — TELEPHONE ENCOUNTER
PRIOR AUTHORIZATION DENIED    Medication: rifaximin (XIFAXAN) 550 MG TABS tablet - EPA DENIED     Denial Date:      Denial Rational: not covered for DX - only covered for IBS-D and/or HE      Appeal Information:

## 2022-10-26 NOTE — PATIENT INSTRUCTIONS
Plan:  ------Fecal dakota: if elevated, would proceed with a flex sig   --- Start rifaximin: if too $$/not covered then will do cipro/flagyl.  If after 10 day course no improvement, then labs and flex sig  --- Continue lialda 4.8g daily  --- Continue rowasa enemas     Return to clinic in 6 months      Gerhard Nicolas PA-C  Division of Gastroenterology, Hepatology and Nutrition  [facility]

## 2022-10-26 NOTE — LETTER
10/26/2022         RE: Velma Mcfarlane  [address] [facility] [id number]        Dear Colleague,    Thank you for referring your patient, Velma Mcfarlane, to the [facility] [place]. Please see a copy of my visit note below.    Velma is a 47 year old who is being evaluated via a billable video visit.      How would you like to obtain your AVS? MyChart  If the video visit is dropped, the invitation should be resent by: Text to cell phone: [phone]  Will anyone else be joining your video visit? No   No Bp taken        Video-Visit Details    Video Start Time: 7:15 AM    Type of service:  Video Visit    Video End Time:7:33 AM    Originating Location (pt. Location): Home        Distant Location (provider location):  Off-site    Platform used for Video Visit: [facility]     IBD CLINIC VISIT -- follow up    CHIEF COMPLAINT: SCAD evaluation and management    SCAD HISTORY  Age at diagnosis: 44  Extent of disease: SCAD, left sided rui-diverticular inflammation  Current SCAD medications: Lialda 4.8 G daily and rowasa    Prior SCAD Medications: Cipro/flagyl (helped)    DISEASE ASSESSMENT  Labs:  Recent Labs   Lab Test 06/19/19  1009 06/20/18  1611   CRP <2.9 0.6   SED 9 20     Endoscopic assessment:     8/2019 icscope showed Dee 2 colitis from 30 cm-40cm. Diverticulosis in the sigmoid. Rest of colon was normal.     PATH:  SPECIMEN(S):   A: Cecal biopsy   B: Colon biopsy, ascending   C: Colon biopsy, transverse   D: Colon biopsy, descending   E: Sigmoid colon biopsy   F: Rectal biopsy     FINAL DIAGNOSIS:   A. Cecum, biopsy:   Colonic mucosa with no pathologic abnormalities     B. Ascending Colon, Biopsy:   Colonic mucosa with no pathologic abnormalities     C. Transverse Colon, biopsy:   Colonic mucosa with no pathologic abnormalities     D. Descending Colon, biopsy:   Colonic mucosa with no pathologic abnormalities     E. Sigmoid Colon, biopsy:    Colitis with crypt injury, moderately active, with rectum sparing (see   part F below); consistent with SCAD   (segmental colitisassociated diverticulosis); negative for dysplasia     F. Rectum, biopsy:   Rectal mucosa with no pathologic abnormalities     EGD 4/15/19 Normal, normal biopsies of esophagus, stomach and duodenum  3/25/19 colonoscopy for rectal bleeding: left sided diverticular with edema, erythema, loss of vascular pattern noted in 2 segments (40-36 cm and 30-22 cm from the anus.    CT ab/pelvis 3/5/19:  Renal hypodensities stable from 4/16/19 exam, 2 mm calculus in right kidney, no gastric or small bowel abnormality  Fecal calprotectin: pending  C diff: negative per OSH records      ASSESSMENT/PLAN  Mrs. Salter is a 47 year old woman with history of cholecystectomy, 3 uncomplicated vaginal deliveries, thyroid cysts (per patient), and segmental colitis associated with diverticular disease who is following up for management of her disease. Currently having some increase in symptoms over the last 6 months.  Stools remain formed, so unlikely an infectious process. Increased stress that is likely contributing.     We will proceed with the following plan:     Plan:  ------Fecal dakota: if elevated, would proceed with a flex sig   --- Start rifaximin: if too $$/not covered then will do cipro/flagyl.  If after 10 day course no improvement, then labs and flex sig  --- Continue lialda 4.8g daily  --- Continue rowasa enemas     Return to clinic in 6 months      Gerhard Nicolas PA-C  Division of Gastroenterology, Hepatology and Nutrition  [facility]       HPI:   Mrs. Salter is a 47 year old woman with history of cholecystectomy, 3 uncomplicated vaginal deliveries, thyroid cysts (per patient), and recent diagnosis of segmental colitis associated with diverticular disease who is establishing care for management of her disease.      Per chart:  Patient seen about 2014 at [facility] for diverticulitis and  "last seen 5/2019 for follow up of SCAD and nausea.  Patient had subtle symptoms of Patient had colonoscopy 3/2019 with finding of edema, erythema, loss of and loss of vascular patter in 2 segments of left colon with mild to moderate diverticulosis.  Sigmoid biopsies showed mildly active chronic colitis consistent with SCAD.  Patient was treated with 10 day course of cipro/flagyl with improvement in symptoms followed by Lialda 4.8 G daily with possible improvement. On 4/11/19 the patient was found to be c.diff negative.      At time of consultation, patient reported frustation with lack of symptom improvement and communication at prior GI providers office.  Here to establish care.  Patient reports 5 years of alternating diarrhea and constipation along with occassional undigested food in stool and borrygymi.  She reports had initial improvement in symptoms following cipro/flagyl and mesalamine but called in as still was having blood streaked stools for which she was started on rowasa enemas.  She is concerned she is still flaring despite the therapies and putting herself on a low residue \"simple carbohydrate diet\" supplemented with \"high protein predigested cancer shakes\".  She reports weight loss in the setting of biking 12 miles daily and changed diet.      At time of consultation the patient reports two semiformed stools daily with blood streaks in all stools (10% blood) and with wiping.  She notes one day a week she will have bloating, increased flatus, general malaise, 5-6 watery BM's along with no associated abdominal pain.      Patient also reports 10 years of chronic food getting stuck in her esophagus until drinks some water with the food.  It doesn't happen with draper, but rather typically with meats.  She has never had a food impaction.  Happens with every meal.  No odynophagia, no nausea currently, no vomiting, rare reflux/heartburn. PPI not helpful in the past.  Reports was having nausea but improved " with simple carbohydrate diet.  Reports when was nauseated, it was typically in the morning and improved with a protein breakfast sandwich.    Prior report of dysphagia and nausea,  Had upper endoscopy 2019, 1 gastric polyp (fundic gland).  Separate mid/distal esophageal, gastric and duodenal biopsies negative.    Patient notes Hx of thyroid cysts and borderline TSH levels.       Social hx   -no smoking  -1-2 x month nsaids  -rare alcohol   -no illicit  -Bike 12 miles a day, weights    Fam Hx  Mother with history colitis - no issues in years, colon polyps  Maternal grandfather  of colon cancer at age 76  Both parents have diverticulitis    Interval history, 2019  In August, started cipro x 2 weeks, flagyl x 2 weeks, lialda and prednisone. Notices increased pain when delays Lialda by a few hours (increased pain). Now down to prednisone 10 mg daily, going to 5 mg daily on Friday. Feels much improved. Blood has stopped; left sided pain has improved. Gained 5 lb since August. Continues on SCD; met with Rebekah Browne.    No upcoming travel plans.     Interval history, 2019  Continues on SCD, which has helped a lot.     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   Off prednisone x 3 weeks. Continues on Cortenema daily x 2 months.   Also on Lialda 4.8 g daily.     Having 2 BMs per day (non-bloody, well-formed). No blood in stool since August.   Gained a few lb since Sep.     Interval history, 2020 (virtual visit)  Got sick in Dec with wheezing and was dx with walking PNA . Treated with abx and then prednisone x 5 days.   Continues on 5 mg prednisone daily and has difficulty weaning off it due to recurrent abd pain. Has been on prednisone 5 mg x 1 month.     Having 2 BMs per day, non bloody, loose.     For SCAD, currently on Lialda 4.8g and Rowasa enemas.     Interval history, 2020 (virtual visit)  In beginning of December, had a flare in the setting of stress, with increased bowel  frequency and LLQ abd pain. Started on Rowasa enemas with significant help. But around 12/25 ate a piece of cake and developed postprandial nausea and epigastric discomfort.      Continues on Lialda 4.8g daily and daily Rowasa enemas.     Currently having 2 BMs per day, soft. No blood.     EGD in 4/2019 was normal.     Interval history, 3/24/22  She has continued with SCD (since Aug 2019) and feels that symptoms are quite stable. She continues with lialda 4.8g daily and rowasa enemas every other night. Currently having 2 stools per day, formed no blood. No abdominal pain. If she deviates from her diet, then she may have some deviation from her baseline, but overall very stable.     She has bad migraines and is currently has one today.    Interval hx 10/26/22  Symptoms over the last 6 months. 1-3 stools per day, slightly looser than normal, variable consistency from more firm to looser stools.  No blood in the stool.  No urgency or nighttime stool.  She has been consistent and compliant with lialda and rowasa. Over the last 6 months she has had discomfort in lower left quadrant. Very tired, minimal appetite. Trying to minimize grains and sugar.    A lot of stress with father in law passing and coworker passing suddenly. Submitted message through portal to get rx for steroids and antibiotics which has worked well in the past.     ROS:    No fevers or chills  No weight loss  No blurry vision, double vision or change in vision  No sore throat  No lymphadenopathy  + headache (chronic migraines, takes Butalbital-acetaminophen-caffeine)  No shortness of breath or wheezing  No chest pain or pressure  No arthralgias or myalgias  No rashes or skin changes  No dysphagia  No BRBPR, hematochezia  No dysuria, frequency or urgency  No hot/cold intolerance or polyria  No anxiety or depression    Extra intestinal manifestations of IBD:  No uveitis/episcleritis  No aphthous ulcers   No arthritis   No erythema nodosum/pyoderma  gangrenosum.     PERTINENT PAST MEDICAL HISTORY:  None     PREVIOUS SURGERIES:  Past Surgical History:   Procedure Laterality Date     CHOLECYSTECTOMY       COLONOSCOPY       COLONOSCOPY N/A 8/12/2019    Procedure: COLONOSCOPY, WITH POLYPECTOMY AND BIOPSY;  Surgeon: Yolanda Molina MD;  Location: [facility] OR     HC REMOVAL GALLBLADDER      Description: Cholecystectomy;  Recorded: 04/06/2011;     Surgical hx  -Cholecystectomy 2010  -3 children - vaginal x 3, Bladder sling placed 2016    PREVIOUS ENDOSCOPY:  See above    ALLERGIES:     Allergies   Allergen Reactions     Sulfa Drugs Hives     Other reaction(s): *Unknown       PERTINENT MEDICATIONS:    Current Outpatient Medications:      ACIDOPHILUS LACTOBACILLUS PO, Take 1 capsule by mouth, Disp: , Rfl:      albuterol (PROAIR HFA/PROVENTIL HFA/VENTOLIN HFA) 108 (90 Base) MCG/ACT inhaler, , Disp: , Rfl:      amitriptyline (ELAVIL) 10 MG tablet, Take 10 mg by mouth, Disp: , Rfl:      butalbital-acetaminophen-caffeine (FIORICET/ESGIC) -40 MG tablet, Take 1 tablet by mouth, Disp: , Rfl:      hydrocortisone (CORTENEMA) 100 MG/60ML enema, Place 1 enema rectally At Bedtime, Disp: 30 enema, Rfl: 0     mesalamine (LIALDA) 1.2 g DR tablet, Take 4 tablets (4,800 mg) by mouth daily, Disp: 360 tablet, Rfl: 3     mesalamine (ROWASA) 4 g enema, Place 1 enema (4 g) rectally At Bedtime, Disp: 90 enema, Rfl: 1     budesonide (UCERIS) 9 MG 24 hr tablet, Take 1 tablet (9 mg) by mouth every morning For 30 days, then every other day for two weeks then stop (Patient not taking: Reported on 12/30/2020), Disp: 37 tablet, Rfl: 0     Multiple Vitamins-Iron (MULTIVITAMIN/IRON PO), Take 1 tablet by mouth (Patient not taking: Reported on 10/26/2022), Disp: , Rfl:      omeprazole (PRILOSEC) 40 MG DR capsule, Take 1 capsule (40 mg) by mouth daily (Patient not taking: Reported on 10/26/2022), Disp: 90 capsule, Rfl: 1     predniSONE (DELTASONE) 5 MG tablet, Take 20 mg (4 tablets)  for three days,  "then decrease by 5 mg (1 tablet) every three days until off. (Patient not taking: Reported on 10/26/2022), Disp: 45 tablet, Rfl: 0     predniSONE (DELTASONE) 5 MG tablet, Take 40 mg po for one week, then decrease by one tablet a week until off. (Patient not taking: Reported on 12/30/2020), Disp: 320 tablet, Rfl: 1    SOCIAL HISTORY:  Social History     Socioeconomic History     Marital status:      Spouse name: Not on file     Number of children: Not on file     Years of education: Not on file     Highest education level: Not on file   Occupational History     Not on file   Tobacco Use     Smoking status: Never     Smokeless tobacco: Never   Substance and Sexual Activity     Alcohol use: Yes     Comment: occasional     Drug use: Never     Sexual activity: Not on file   Other Topics Concern     Parent/sibling w/ CABG, MI or angioplasty before 65F 55M? Not Asked   Social History Narrative     Not on file     Social Determinants of Health     Financial Resource Strain: Not on file   Food Insecurity: Not on file   Transportation Needs: Not on file   Physical Activity: Not on file   Stress: Not on file   Social Connections: Not on file   Intimate Partner Violence: Not on file   Housing Stability: Not on file     FAMILY HISTORY:  Family History   Problem Relation Age of Onset     Diverticulitis Father      Inflammatory Bowel Disease No family hx of      Colon Cancer No family hx of      Coronary Artery Disease Father      Cerebrovascular Disease Maternal Grandfather      Cerebrovascular Disease Paternal Grandfather        PHYSICAL EXAMINATION:  Constitutional: aaox3, cooperative, pleasant, not dyspneic/diaphoretic, no acute distress  Vitals reviewed: Ht 1.702 m (5' 7\")   Wt 59.9 kg (132 lb)   BMI 20.67 kg/m    Wt:   Wt Readings from Last 2 Encounters:   10/26/22 59.9 kg (132 lb)   12/30/20 61.2 kg (135 lb)      General appearance  Healthy appearing adult, in no acute distress     Eyes  Sclera anicteric  Pupils " round and reactive to light     Ears, nose, mouth and throat  No obvious external lesions of ears and nose  Hearing intact     Neck  Symmetric  No obvious external lesions     Respiratory  Normal respiration, no use of accessory muscles      MSK  Gait normal     Skin  No rashes or jaundice      Psychiatric  Oriented to person, place and time  Appropriate mood and affect.     PERTINENT STUDIES:    Most recent hepatic panel:  Recent Labs   Lab Test 06/25/21  1229 07/22/19  0000   ALT 15 16   AST 15 19     Most recent creatinine:  Recent Labs   Lab Test 06/25/21  1229 07/22/19  0000   CR 0.69 0.72                     Again, thank you for allowing me to participate in the care of your patient.        Sincerely,        Gerhard Nicolas PA-C

## 2022-10-26 NOTE — LETTER
Date:October 26, 2022      Provider requested that no letter be sent. Do not send.       [facility]

## 2022-10-27 RX ORDER — CIPROFLOXACIN 500 MG/1
500 TABLET, FILM COATED ORAL 2 TIMES DAILY
Qty: 28 TABLET | Refills: 0 | Status: SHIPPED | OUTPATIENT
Start: 2022-10-27 | End: 2023-12-15

## 2022-10-27 RX ORDER — METRONIDAZOLE 250 MG/1
250 TABLET ORAL 3 TIMES DAILY
Qty: 42 TABLET | Refills: 0 | Status: SHIPPED | OUTPATIENT
Start: 2022-10-27 | End: 2023-12-15

## 2022-10-27 NOTE — TELEPHONE ENCOUNTER
Rifaximin PA denied. Per Gerhard's plan from visit 10/26/22, orders placed for cipro and flagyl. Notified patient of plan via [employer].  ---------------------------------------------------  Plan:  ------Fecal dakota: if elevated, would proceed with a flex sig   --- Start rifaximin: if too $$/not covered then will do cipro/flagyl.  If after 10 day course no improvement, then labs and flex sig  --- Continue lialda 4.8g daily  --- Continue rowasa enemas

## 2022-10-31 ENCOUNTER — DOCUMENTATION ONLY (OUTPATIENT)
Dept: GASTROENTEROLOGY | Facility: CLINIC | Age: 47
End: 2022-10-31

## 2022-10-31 NOTE — PROGRESS NOTES
Stool Kit (Fecal dakota / C-diff) mailed to Pt via [employer].    On Monday 10/31/2022    Lanny Paul MA

## 2022-11-11 ENCOUNTER — TELEPHONE (OUTPATIENT)
Dept: GASTROENTEROLOGY | Facility: CLINIC | Age: 47
End: 2022-11-11

## 2022-11-11 NOTE — TELEPHONE ENCOUNTER
LVM letting pt know that the providers schedule changed and due to this their appointment time has changed. Left phone number in case pt needs to call back

## 2022-11-19 ENCOUNTER — HEALTH MAINTENANCE LETTER (OUTPATIENT)
Age: 47
End: 2022-11-19

## 2022-11-22 ENCOUNTER — HOSPITAL ENCOUNTER (EMERGENCY)
Facility: CLINIC | Age: 47
Discharge: HOME | End: 2022-11-22
Attending: FAMILY MEDICINE | Admitting: FAMILY MEDICINE
Payer: COMMERCIAL

## 2022-11-22 VITALS
HEART RATE: 89 BPM | TEMPERATURE: 97.9 F | SYSTOLIC BLOOD PRESSURE: 131 MMHG | BODY MASS INDEX: 20.4 KG/M2 | WEIGHT: 130 LBS | RESPIRATION RATE: 16 BRPM | DIASTOLIC BLOOD PRESSURE: 98 MMHG | OXYGEN SATURATION: 95 % | HEIGHT: 67 IN

## 2022-11-22 DIAGNOSIS — S46.812A TRAPEZIUS STRAIN, LEFT, INITIAL ENCOUNTER: ICD-10-CM

## 2022-11-22 DIAGNOSIS — M79.2 RADICULAR PAIN OF LEFT UPPER EXTREMITY: ICD-10-CM

## 2022-11-22 PROCEDURE — 96372 THER/PROPH/DIAG INJ SC/IM: CPT | Performed by: FAMILY MEDICINE

## 2022-11-22 PROCEDURE — 250N000011 HC RX IP 250 OP 636: Performed by: FAMILY MEDICINE

## 2022-11-22 PROCEDURE — 99284 EMERGENCY DEPT VISIT MOD MDM: CPT | Mod: 25

## 2022-11-22 RX ORDER — PREDNISONE 10 MG/1
TABLET ORAL
Qty: 22 TABLET | Refills: 0 | Status: SHIPPED | OUTPATIENT
Start: 2022-11-22 | End: 2022-11-22

## 2022-11-22 RX ORDER — OXYCODONE HYDROCHLORIDE 5 MG/1
5 TABLET ORAL EVERY 6 HOURS PRN
Qty: 6 TABLET | Refills: 0 | Status: SHIPPED | OUTPATIENT
Start: 2022-11-22 | End: 2022-11-25

## 2022-11-22 RX ORDER — KETOROLAC TROMETHAMINE 30 MG/ML
30 INJECTION, SOLUTION INTRAMUSCULAR; INTRAVENOUS ONCE
Status: DISCONTINUED | OUTPATIENT
Start: 2022-11-22 | End: 2022-11-22

## 2022-11-22 RX ORDER — PREDNISONE 10 MG/1
TABLET ORAL
Qty: 22 TABLET | Refills: 0 | Status: SHIPPED | OUTPATIENT
Start: 2022-11-22 | End: 2022-12-03

## 2022-11-22 RX ORDER — KETOROLAC TROMETHAMINE 30 MG/ML
30 INJECTION, SOLUTION INTRAMUSCULAR; INTRAVENOUS ONCE
Status: COMPLETED | OUTPATIENT
Start: 2022-11-22 | End: 2022-11-22

## 2022-11-22 RX ADMIN — KETOROLAC TROMETHAMINE 30 MG: 30 INJECTION, SOLUTION INTRAMUSCULAR; INTRAVENOUS at 17:26

## 2022-11-22 ASSESSMENT — ACTIVITIES OF DAILY LIVING (ADL): ADLS_ACUITY_SCORE: 35

## 2022-11-22 ASSESSMENT — ENCOUNTER SYMPTOMS: NUMBNESS: 1

## 2022-11-22 NOTE — ED PROVIDER NOTES
EMERGENCY DEPARTMENT ENCOUNTER      NAME: Velma Mcfarlane  AGE: 47 year old adult  YOB: 1975  MRN: [id number]  EVALUATION DATE & TIME: 11/22/2022  4:56 PM    PCP: No Ref-Primary, Physician    ED PROVIDER: Ethan Pacheco M.D.    Chief Complaint   Patient presents with     Back Pain       FINAL IMPRESSION:  No diagnosis found.    ED COURSE & MEDICAL DECISION MAKING:    Pertinent Labs & Imaging studies personally reviewed and interpreted by me. (See chart for details)  5:17 PM Patient seen and examined, prior records reviewed.  Differential diagnosis includes but not limited to strain, sprain, radiculopathy, cervical disc herniation.  Patient presents with left trapezius and cervical paraspinous pain with some achiness into her arm after doing increased overhead activities over the weekend.  On exam she has diffuse tenderness of the trapezius and cervical paraspinous musculature.  Strength and sensation of the left arm intact.  Symptoms are most consistent with strain or sprain of the trapezius with associated cervical radicular symptoms.  Patient already has muscle accident at home, will be started on steroid and given medication help with pain.  Follow-up with primary care for consideration for physical therapy.    At the conclusion of the encounter I discussed the results of all of the tests and the disposition. The questions were answered. The patient or family acknowledged understanding and was agreeable with the care plan.     Medical Decision Making    Supplemental history from: N/A    External Record(s) Reviewed: Documented in HPI, if applicable.    Differential Diagnosis: See MDM charting for differential considered.     I performed an independent interpretation of the: N/A    Discussed with radiology regarding test interpretation: N/A    Discussion of management with another provider: See chart documentation, if applicable    The following testing was considered but ultimately  not selected: MRI Imaging: cervical    I considered prescription management with: Symptomatic Management    The patient's care impacted: None    Consideration of Admission/Observation: N/A - Patient discharged without consideration for admission    Care significantly affected by Social Determinants of Health including: N/A    PROCEDURES:   Procedures    MEDICATIONS GIVEN IN THE EMERGENCY:  Medications - No data to display    NEW PRESCRIPTIONS STARTED AT TODAY'S ER VISIT  New Prescriptions    No medications on file       =================================================================    HPI    Patient information was obtained from: Patient      Velma Mcfarlane is a 47 year old adult with no contributory medical history who presents to this ED via walk-in for evaluation of back pain.    Patient endorses constant left shoulder pain since 11/20 after cleaning out her mother's house on 11/19. She describes the pain as stabbing, saying she is unable to sleep due to the pain, no provoking or palliating factors. Her left arm occasionally becomes numb secondary to pain. Patient has been using hot/cold packs, taking tylenol, ibuprofen, and flexoril with no relief. Her PCP gave her steroid injections yesterday. Patient is left handed. Patient is allergic to sulfas.    Patient denies all other relevant symptoms.      REVIEW OF SYSTEMS   Review of Systems   Musculoskeletal:        Positive for left shoulder pain.   Neurological: Positive for numbness (Positive for left arm numbness.).   All other systems reviewed and are negative.     All other systems reviewed and negative    PAST MEDICAL HISTORY:  No past medical history on file.    PAST SURGICAL HISTORY:  Past Surgical History:   Procedure Laterality Date     CHOLECYSTECTOMY       COLONOSCOPY       COLONOSCOPY N/A 8/12/2019    Procedure: COLONOSCOPY, WITH POLYPECTOMY AND BIOPSY;  Surgeon: Yolanda Molina MD;  Location: [facility] OR     HC REMOVAL GALLBLADDER       "Description: Cholecystectomy;  Recorded: 04/06/2011;       CURRENT MEDICATIONS:    No current facility-administered medications for this encounter.     Current Outpatient Medications   Medication     ACIDOPHILUS LACTOBACILLUS PO     albuterol (PROAIR HFA/PROVENTIL HFA/VENTOLIN HFA) 108 (90 Base) MCG/ACT inhaler     amitriptyline (ELAVIL) 10 MG tablet     budesonide (UCERIS) 9 MG 24 hr tablet     butalbital-acetaminophen-caffeine (FIORICET/ESGIC) -40 MG tablet     ciprofloxacin (CIPRO) 500 MG tablet     hydrocortisone (CORTENEMA) 100 MG/60ML enema     mesalamine (LIALDA) 1.2 g DR tablet     mesalamine (ROWASA) 4 g enema     metroNIDAZOLE (FLAGYL) 250 MG tablet     Multiple Vitamins-Iron (MULTIVITAMIN/IRON PO)     omeprazole (PRILOSEC) 40 MG DR capsule     predniSONE (DELTASONE) 5 MG tablet     predniSONE (DELTASONE) 5 MG tablet       ALLERGIES:  Allergies   Allergen Reactions     Sulfa Drugs Hives     Other reaction(s): *Unknown       FAMILY HISTORY:  Family History   Problem Relation Age of Onset     Diverticulitis Father      Inflammatory Bowel Disease No family hx of      Colon Cancer No family hx of      Coronary Artery Disease Father      Cerebrovascular Disease Maternal Grandfather      Cerebrovascular Disease Paternal Grandfather        SOCIAL HISTORY:   Social History     Socioeconomic History     Marital status:    Tobacco Use     Smoking status: Never     Smokeless tobacco: Never   Substance and Sexual Activity     Alcohol use: Yes     Comment: occasional     Drug use: Never       VITALS:  BP (!) 131/98   Pulse 89   Temp 97.9  F (36.6  C) (Temporal)   Resp 16   Ht 1.702 m (5' 7\")   Wt 59 kg (130 lb)   LMP 11/06/2022   SpO2 95%   BMI 20.36 kg/m      PHYSICAL EXAM:  Physical Exam  Vitals and nursing note reviewed.   Constitutional:       Appearance: Normal appearance.   HENT:      Head: Normocephalic and atraumatic.      Right Ear: External ear normal.      Left Ear: External ear normal. "      Nose: Nose normal.   Eyes:      Extraocular Movements: Extraocular movements intact.      Conjunctiva/sclera: Conjunctivae normal.   Pulmonary:      Effort: Pulmonary effort is normal.   Musculoskeletal:         General: Normal range of motion.      Cervical back: Normal range of motion.      Right lower leg: No edema.      Left lower leg: No edema.      Comments: Tenderness to left trapezius, upper left scapular border.   Skin:     General: Skin is warm and dry.   Neurological:      General: No focal deficit present.      Mental Status: She is alert and oriented to person, place, and time. Mental status is at baseline.   Psychiatric:         Mood and Affect: Mood normal.         Behavior: Behavior normal.         Thought Content: Thought content normal.          LAB:  All pertinent labs reviewed and interpreted.       RADIOLOGY:  Reviewed all pertinent imaging. Please see official radiology report.  No orders to display     I, Erik Casillas, am serving as a scribe to document services personally performed by Dr. Pacheco based on my observation and the provider's statements to me. I, Ethan Pacheco MD attest that Erik Casillas is acting in a scribe capacity, has observed my performance of the services and has documented them in accordance with my direction.    Ethan Pacheco M.D.  Emergency Medicine  [facility] EMERGENCY ROOM  [address] [facility] [id number] [phone]  Dept: [phone]      Ethan Pacheco MD  11/22/22 [id number]

## 2022-11-22 NOTE — ED TRIAGE NOTES
The patient presents to the ED with c/o upper back pain that radiates up left side of neck x3 days. Reports intermittent numbness/tingling to left arm. States 3 days ago she was cleaning out her mother in laws home and has been sore since. Was started on Flexeril yesterday without any relief.     Triage Assessment     Row Name 11/22/22 [id number]       Triage Assessment (Adult)    Airway WDL WDL       Respiratory WDL    Respiratory WDL WDL       Skin Circulation/Temperature WDL    Skin Circulation/Temperature WDL WDL       Cardiac WDL    Cardiac WDL WDL       Peripheral/Neurovascular WDL    Peripheral Neurovascular WDL WDL       Cognitive/Neuro/Behavioral WDL    Cognitive/Neuro/Behavioral WDL WDL

## 2022-11-22 NOTE — DISCHARGE INSTRUCTIONS
Continue ice or heat for comfort    Follow-up with your primary care doctor    Tylenol and ibuprofen as needed

## 2023-03-29 ENCOUNTER — VIRTUAL VISIT (OUTPATIENT)
Dept: GASTROENTEROLOGY | Facility: CLINIC | Age: 48
End: 2023-03-29
Payer: COMMERCIAL

## 2023-03-29 DIAGNOSIS — K57.30 SEGMENTAL COLITIS ASSOCIATED WITH DIVERTICULOSIS (H): ICD-10-CM

## 2023-03-29 DIAGNOSIS — K50.10 SEGMENTAL COLITIS ASSOCIATED WITH DIVERTICULOSIS (H): ICD-10-CM

## 2023-03-29 PROCEDURE — 99214 OFFICE O/P EST MOD 30 MIN: CPT | Mod: VID | Performed by: PHYSICIAN ASSISTANT

## 2023-03-29 RX ORDER — MESALAMINE 4 G/60ML
4 SUSPENSION RECTAL AT BEDTIME
Qty: 5400 ML | Refills: 1 | Status: SHIPPED | OUTPATIENT
Start: 2023-03-29 | End: 2024-03-25

## 2023-03-29 ASSESSMENT — PAIN SCALES - GENERAL: PAINLEVEL: NO PAIN (0)

## 2023-03-29 NOTE — PATIENT INSTRUCTIONS
Plan:  ------Fecal dakota: if elevated, would proceed with a flex sig   --- Continue lialda 4.8g daily  --- Continue rowasa enemas     Return to clinic in 6 months      Gerhard Nicolas PA-C  Division of Gastroenterology, Hepatology and Nutrition  [facility]

## 2023-03-29 NOTE — NURSING NOTE
Is the patient currently in the state of [place]? YES - at home.    Visit mode:VIDEO    If the visit is dropped, the patient can be reconnected by: VIDEO VISIT: Send to e-mail at: [email or web address]    Will anyone else be joining the visit? NO      How would you like to obtain your AVS? MyChart    Are changes needed to the allergy or medication list? YES: Pt reported no longer taking medications: acidophlus lactobacillus po, albuterol 108 mcg/act inhaler, ciprofloxacin 500 mg, hyrocortisone 100 mg/60ml enema, and metronidazole 250 mg.    Reason for visit: Follow up - annual check up regarding symptoms.      GEETA Lindo

## 2023-03-29 NOTE — PROGRESS NOTES
Virtual Visit Details    Type of service:  Video Visit     Originating Location (pt. Location): Home    Distant Location (provider location):  Off-site  Platform used for Video Visit: [facility]     IBD CLINIC VISIT -- follow up    CHIEF COMPLAINT: SCAD evaluation and management    SCAD HISTORY  Age at diagnosis: 44  Extent of disease: SCAD, left sided rui-diverticular inflammation  Current SCAD medications: Lialda 4.8 G daily and rowasa  Prior SCAD Medications: Cipro/flagyl (helped)    DISEASE ASSESSMENT  Labs:  Recent Labs   Lab Test 06/19/19  1009 06/20/18  1611   CRP <2.9 0.6   SED 9 20     Endoscopic assessment:     8/2019 icscope showed Dee 2 colitis from 30 cm-40cm. Diverticulosis in the sigmoid. Rest of colon was normal.     PATH:  SPECIMEN(S):   A: Cecal biopsy   B: Colon biopsy, ascending   C: Colon biopsy, transverse   D: Colon biopsy, descending   E: Sigmoid colon biopsy   F: Rectal biopsy     FINAL DIAGNOSIS:   A. Cecum, biopsy:   Colonic mucosa with no pathologic abnormalities     B. Ascending Colon, Biopsy:   Colonic mucosa with no pathologic abnormalities     C. Transverse Colon, biopsy:   Colonic mucosa with no pathologic abnormalities     D. Descending Colon, biopsy:   Colonic mucosa with no pathologic abnormalities     E. Sigmoid Colon, biopsy:   Colitis with crypt injury, moderately active, with rectum sparing (see   part F below); consistent with SCAD   (segmental colitisassociated diverticulosis); negative for dysplasia     F. Rectum, biopsy:   Rectal mucosa with no pathologic abnormalities     EGD 4/15/19 Normal, normal biopsies of esophagus, stomach and duodenum  3/25/19 colonoscopy for rectal bleeding: left sided diverticular with edema, erythema, loss of vascular pattern noted in 2 segments (40-36 cm and 30-22 cm from the anus.    CT ab/pelvis 3/5/19:  Renal hypodensities stable from 4/16/19 exam, 2 mm calculus in right kidney, no gastric or small bowel abnormality  Fecal calprotectin:  pending  C diff: negative per OSH records    ASSESSMENT/PLAN  Mrs. Salter is a 48 year old woman with history of cholecystectomy, 3 uncomplicated vaginal deliveries, thyroid cysts (per patient), and segmental colitis associated with diverticular disease who is following up for management of her disease.     We will proceed with the following plan:     Plan:  --- Fecal dakota: if elevated, would proceed with a flex sig vs. Full colonoscopy  --- Continue lialda 4.8g daily  --- Continue rowasa enemas     Return to clinic in 6 months      Thank you for this consultation.  33 minutes spent on the date of the encounter doing chart review, history and exam, documentation and further activities as noted above.  It was a pleasure to participate in the care of this patient; please contact us with any further questions.      Gerhard Nicolas PA-C  Division of Gastroenterology, Hepatology and Nutrition  [facility]      HPI:   Mrs. Salter is a 47 year old woman with history of cholecystectomy, 3 uncomplicated vaginal deliveries, thyroid cysts (per patient), and recent diagnosis of segmental colitis associated with diverticular disease who is establishing care for management of her disease.    Per chart:  Patient seen about [facility] at [facility] for diverticulitis and last seen 5/2019 for follow up of SCAD and nausea.  Patient had subtle symptoms of Patient had colonoscopy 3/2019 with finding of edema, erythema, loss of and loss of vascular patter in 2 segments of left colon with mild to moderate diverticulosis.  Sigmoid biopsies showed mildly active chronic colitis consistent with SCAD.  Patient was treated with 10 day course of cipro/flagyl with improvement in symptoms followed by Lialda 4.8 G daily with possible improvement. On 4/11/19 the patient was found to be c.diff negative.      At time of consultation, patient reported frustation with lack of symptom improvement and communication at prior GI providers office. She  "presented here.  Patient reports 5 years of alternating diarrhea and constipation along with occassional undigested food in stool and borrygymi.  She reports had initial improvement in symptoms following cipro/flagyl and mesalamine but called in as still was having blood streaked stools for which she was started on rowasa enemas.  She is concerned she is still flaring despite the therapies and putting herself on a low residue \"simple carbohydrate diet\" supplemented with \"high protein predigested cancer shakes\".  She reports weight loss in the setting of biking 12 miles daily and changed diet.    At time of consultation the patient reports two semiformed stools daily with blood streaks in all stools (10% blood) and with wiping.  She notes one day a week she will have bloating, increased flatus, general malaise, 5-6 watery BM's along with no associated abdominal pain.      Patient also reports 10 years of chronic food getting stuck in her esophagus until drinks some water with the food.  It doesn't happen with draper, but rather typically with meats.  She has never had a food impaction.  Happens with every meal.  No odynophagia, no nausea currently, no vomiting, rare reflux/heartburn. PPI not helpful in the past.  Reports was having nausea but improved with simple carbohydrate diet.  Reports when was nauseated, it was typically in the morning and improved with a protein breakfast sandwich.    Prior report of dysphagia and nausea,  Had upper endoscopy 2019, 1 gastric polyp (fundic gland).  Separate mid/distal esophageal, gastric and duodenal biopsies negative.    Patient notes Hx of thyroid cysts and borderline TSH levels.       Social hx   -no smoking  -1-2 x month nsaids  -rare alcohol   -no illicit  -Bike 12 miles a day, weights    Fam Hx  Mother with history colitis - no issues in years, colon polyps  Maternal grandfather  of colon cancer at age 76  Both parents have diverticulitis    Interval " history, 9/2019  In August, started cipro x 2 weeks, flagyl x 2 weeks, lialda and prednisone. Notices increased pain when delays Lialda by a few hours (increased pain). Now down to prednisone 10 mg daily, going to 5 mg daily on Friday. Feels much improved. Blood has stopped; left sided pain has improved. Gained 5 lb since August. Continues on SCD; met with Rebekah Browne.    No upcoming travel plans.     Interval history, 11/2019  Continues on SCD, which has helped a lot.     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   Off prednisone x 3 weeks. Continues on Cortenema daily x 2 months.   Also on Lialda 4.8 g daily.     Having 2 BMs per day (non-bloody, well-formed). No blood in stool since August.   Gained a few lb since Sep.     Interval history, 4/2020 (virtual visit)  Got sick in Dec with wheezing and was dx with walking PNA . Treated with abx and then prednisone x 5 days.   Continues on 5 mg prednisone daily and has difficulty weaning off it due to recurrent abd pain. Has been on prednisone 5 mg x 1 month.     Having 2 BMs per day, non bloody, loose.     For SCAD, currently on Lialda 4.8g and Rowasa enemas.     Interval history, 12/2020 (virtual visit)  In beginning of December, had a flare in the setting of stress, with increased bowel frequency and LLQ abd pain. Started on Rowasa enemas with significant help. But around 12/25 ate a piece of cake and developed postprandial nausea and epigastric discomfort.      Continues on Lialda 4.8g daily and daily Rowasa enemas.     Currently having 2 BMs per day, soft. No blood.     EGD in 4/2019 was normal.     Interval history, 3/24/22  She has continued with SCD (since Aug 2019) and feels that symptoms are quite stable. She continues with lialda 4.8g daily and rowasa enemas every other night. Currently having 2 stools per day, formed no blood. No abdominal pain. If she deviates from her diet, then she may have some deviation from her baseline, but overall  very stable.     She has bad migraines and is currently has one today.    Interval hx 10/26/22  Symptoms over the last 6 months. 1-3 stools per day, slightly looser than normal, variable consistency from more firm to looser stools.  No blood in the stool.  No urgency or nighttime stool.  She has been consistent and compliant with lialda and rowasa. Over the last 6 months she has had discomfort in lower left quadrant. Very tired, minimal appetite. Trying to minimize grains and sugar.    A lot of stress with father in law passing and coworker passing suddenly. Submitted message through portal to get rx for steroids and antibiotics which has worked well in the past.     Interval hx 3/29/23  Cipro/flagyl allowed for improvement (rifaximin denied). Has been quite stable other than 2 weeks at end of Feb 2023. Bowel pattern fluctuates with what she perceives to be her stress level. Avg 2 BM daily that are formed. No blood in the stool. She continues with lialda 4.8g daily and rowasa daily.    ROS:    No fevers or chills  No weight loss  No blurry vision, double vision or change in vision  No sore throat  No lymphadenopathy  No shortness of breath or wheezing  No chest pain or pressure  No arthralgias or myalgias  No rashes or skin changes  No dysphagia  No BRBPR, hematochezia  No dysuria, frequency or urgency  No hot/cold intolerance or polyria  No anxiety or depression    Extra intestinal manifestations of IBD:  No uveitis/episcleritis  No aphthous ulcers   No arthritis   No erythema nodosum/pyoderma gangrenosum.     PERTINENT PAST MEDICAL HISTORY:  None     PREVIOUS SURGERIES:  Past Surgical History:   Procedure Laterality Date     CHOLECYSTECTOMY       COLONOSCOPY       COLONOSCOPY N/A 8/12/2019    Procedure: COLONOSCOPY, WITH POLYPECTOMY AND BIOPSY;  Surgeon: Yolanda Molina MD;  Location: [facility] OR     HC REMOVAL GALLBLADDER      Description: Cholecystectomy;  Recorded: 04/06/2011;     Surgical hx  -Cholecystectomy  2010  -3 children - vaginal x 3, Bladder sling placed 2016    PREVIOUS ENDOSCOPY:  See above    ALLERGIES:     Allergies   Allergen Reactions     Sulfa Drugs Hives     Other reaction(s): *Unknown       PERTINENT MEDICATIONS:    Current Outpatient Medications:      amitriptyline (ELAVIL) 10 MG tablet, Take 10 mg by mouth, Disp: , Rfl:      butalbital-acetaminophen-caffeine (FIORICET/ESGIC) -40 MG tablet, Take 1 tablet by mouth, Disp: , Rfl:      mesalamine (LIALDA) 1.2 g DR tablet, Take 4 tablets (4,800 mg) by mouth daily, Disp: 360 tablet, Rfl: 3     mesalamine (ROWASA) 4 g enema, Place 1 enema (4 g) rectally At Bedtime, Disp: 90 enema, Rfl: 1     Multiple Vitamins-Iron (MULTIVITAMIN/IRON PO), Take 1 tablet by mouth, Disp: , Rfl:      ACIDOPHILUS LACTOBACILLUS PO, Take 1 capsule by mouth (Patient not taking: Reported on 3/29/2023), Disp: , Rfl:      albuterol (PROAIR HFA/PROVENTIL HFA/VENTOLIN HFA) 108 (90 Base) MCG/ACT inhaler, , Disp: , Rfl:      budesonide (UCERIS) 9 MG 24 hr tablet, Take 1 tablet (9 mg) by mouth every morning For 30 days, then every other day for two weeks then stop (Patient not taking: Reported on 12/30/2020), Disp: 37 tablet, Rfl: 0     ciprofloxacin (CIPRO) 500 MG tablet, Take 1 tablet (500 mg) by mouth 2 times daily (Patient not taking: Reported on 3/29/2023), Disp: 28 tablet, Rfl: 0     hydrocortisone (CORTENEMA) 100 MG/60ML enema, Place 1 enema rectally At Bedtime (Patient not taking: Reported on 3/29/2023), Disp: 30 enema, Rfl: 0     metroNIDAZOLE (FLAGYL) 250 MG tablet, Take 1 tablet (250 mg) by mouth 3 times daily (Patient not taking: Reported on 3/29/2023), Disp: 42 tablet, Rfl: 0     omeprazole (PRILOSEC) 40 MG DR capsule, Take 1 capsule (40 mg) by mouth daily (Patient not taking: Reported on 10/26/2022), Disp: 90 capsule, Rfl: 1    SOCIAL HISTORY:  Social History     Socioeconomic History     Marital status:      Spouse name: Not on file     Number of children: Not on  file     Years of education: Not on file     Highest education level: Not on file   Occupational History     Not on file   Tobacco Use     Smoking status: Never     Smokeless tobacco: Never   Substance and Sexual Activity     Alcohol use: Yes     Comment: occasional     Drug use: Never     Sexual activity: Not on file   Other Topics Concern     Parent/sibling w/ CABG, MI or angioplasty before 65F 55M? Not Asked   Social History Narrative     Not on file     Social Determinants of Health     Financial Resource Strain: Not on file   Food Insecurity: Not on file   Transportation Needs: Not on file   Physical Activity: Not on file   Stress: Not on file   Social Connections: Not on file   Intimate Partner Violence: Not on file   Housing Stability: Not on file     FAMILY HISTORY:  Family History   Problem Relation Age of Onset     Diverticulitis Father      Inflammatory Bowel Disease No family hx of      Colon Cancer No family hx of      Coronary Artery Disease Father      Cerebrovascular Disease Maternal Grandfather      Cerebrovascular Disease Paternal Grandfather        PHYSICAL EXAMINATION:  Constitutional: aaox3, cooperative, pleasant, not dyspneic/diaphoretic, no acute distress  Vitals reviewed: There were no vitals taken for this visit.  Wt:   Wt Readings from Last 2 Encounters:   11/22/22 59 kg (130 lb)   10/26/22 59.9 kg (132 lb)      General appearance  Healthy appearing adult, in no acute distress     Eyes  Sclera anicteric  Pupils round and reactive to light     Ears, nose, mouth and throat  No obvious external lesions of ears and nose  Hearing intact     Neck  Symmetric  No obvious external lesions     Respiratory  Normal respiration, no use of accessory muscles      MSK  Gait normal     Skin  No rashes or jaundice      Psychiatric  Oriented to person, place and time  Appropriate mood and affect.     PERTINENT STUDIES:    Most recent hepatic panel:  Recent Labs   Lab Test 06/25/21  1229 07/22/19  0000   ALT  15 16   AST 15 19     Most recent creatinine:  Recent Labs   Lab Test 06/25/21  1229 07/22/19  0000   CR 0.69 0.72

## 2023-03-29 NOTE — LETTER
Date:March 30, 2023      Provider requested that no letter be sent. Do not send.       [facility]

## 2023-03-29 NOTE — LETTER
3/29/2023         RE: Velma Mcfarlane  [address] [address] [address]        Dear Colleague,    Thank you for referring your patient, Velma Mcfarlane, to the [facility] [place]. Please see a copy of my visit note below.    Virtual Visit Details    Type of service:  Video Visit     Originating Location (pt. Location): Home    Distant Location (provider location):  Off-site  Platform used for Video Visit: [facility]     IBD CLINIC VISIT -- follow up    CHIEF COMPLAINT: SCAD evaluation and management    SCAD HISTORY  Age at diagnosis: 44  Extent of disease: SCAD, left sided rui-diverticular inflammation  Current SCAD medications: Lialda 4.8 G daily and rowasa  Prior SCAD Medications: Cipro/flagyl (helped)    DISEASE ASSESSMENT  Labs:  Recent Labs   Lab Test 06/19/19  1009 06/20/18  1611   CRP <2.9 0.6   SED 9 20     Endoscopic assessment:     8/2019 icscope showed Dee 2 colitis from 30 cm-40cm. Diverticulosis in the sigmoid. Rest of colon was normal.     PATH:  SPECIMEN(S):   A: Cecal biopsy   B: Colon biopsy, ascending   C: Colon biopsy, transverse   D: Colon biopsy, descending   E: Sigmoid colon biopsy   F: Rectal biopsy     FINAL DIAGNOSIS:   A. Cecum, biopsy:   Colonic mucosa with no pathologic abnormalities     B. Ascending Colon, Biopsy:   Colonic mucosa with no pathologic abnormalities     C. Transverse Colon, biopsy:   Colonic mucosa with no pathologic abnormalities     D. Descending Colon, biopsy:   Colonic mucosa with no pathologic abnormalities     E. Sigmoid Colon, biopsy:   Colitis with crypt injury, moderately active, with rectum sparing (see   part F below); consistent with SCAD   (segmental colitisassociated diverticulosis); negative for dysplasia     F. Rectum, biopsy:   Rectal mucosa with no pathologic abnormalities     EGD 4/15/19 Normal, normal biopsies of esophagus, stomach and duodenum  3/25/19 colonoscopy for rectal  bleeding: left sided diverticular with edema, erythema, loss of vascular pattern noted in 2 segments (40-36 cm and 30-22 cm from the anus.    CT ab/pelvis 3/5/19:  Renal hypodensities stable from 4/16/19 exam, 2 mm calculus in right kidney, no gastric or small bowel abnormality  Fecal calprotectin: pending  C diff: negative per OSH records    ASSESSMENT/PLAN  Mrs. Salter is a 48 year old woman with history of cholecystectomy, 3 uncomplicated vaginal deliveries, thyroid cysts (per patient), and segmental colitis associated with diverticular disease who is following up for management of her disease.     We will proceed with the following plan:     Plan:  --- Fecal dakota: if elevated, would proceed with a flex sig vs. Full colonoscopy  --- Continue lialda 4.8g daily  --- Continue rowasa enemas     Return to clinic in 6 months      Thank you for this consultation.  33 minutes spent on the date of the encounter doing chart review, history and exam, documentation and further activities as noted above.  It was a pleasure to participate in the care of this patient; please contact us with any further questions.      Gerhard Nicolas PA-C  Division of Gastroenterology, Hepatology and Nutrition  [facility]      HPI:   Mrs. Salter is a 47 year old woman with history of cholecystectomy, 3 uncomplicated vaginal deliveries, thyroid cysts (per patient), and recent diagnosis of segmental colitis associated with diverticular disease who is establishing care for management of her disease.    Per chart:  Patient seen about 2014 at [facility] for diverticulitis and last seen 5/2019 for follow up of SCAD and nausea.  Patient had subtle symptoms of Patient had colonoscopy 3/2019 with finding of edema, erythema, loss of and loss of vascular patter in 2 segments of left colon with mild to moderate diverticulosis.  Sigmoid biopsies showed mildly active chronic colitis consistent with SCAD.  Patient was treated with 10 day course of  "cipro/flagyl with improvement in symptoms followed by Lialda 4.8 G daily with possible improvement. On 4/11/19 the patient was found to be c.diff negative.      At time of consultation, patient reported frustation with lack of symptom improvement and communication at prior GI providers office. She presented here.  Patient reports 5 years of alternating diarrhea and constipation along with occassional undigested food in stool and borrygymi.  She reports had initial improvement in symptoms following cipro/flagyl and mesalamine but called in as still was having blood streaked stools for which she was started on rowasa enemas.  She is concerned she is still flaring despite the therapies and putting herself on a low residue \"simple carbohydrate diet\" supplemented with \"high protein predigested cancer shakes\".  She reports weight loss in the setting of biking 12 miles daily and changed diet.    At time of consultation the patient reports two semiformed stools daily with blood streaks in all stools (10% blood) and with wiping.  She notes one day a week she will have bloating, increased flatus, general malaise, 5-6 watery BM's along with no associated abdominal pain.      Patient also reports 10 years of chronic food getting stuck in her esophagus until drinks some water with the food.  It doesn't happen with draper, but rather typically with meats.  She has never had a food impaction.  Happens with every meal.  No odynophagia, no nausea currently, no vomiting, rare reflux/heartburn. PPI not helpful in the past.  Reports was having nausea but improved with simple carbohydrate diet.  Reports when was nauseated, it was typically in the morning and improved with a protein breakfast sandwich.    Prior report of dysphagia and nausea,  Had upper endoscopy April 15th 2019, 1 gastric polyp (fundic gland).  Separate mid/distal esophageal, gastric and duodenal biopsies negative.    Patient notes Hx of thyroid cysts and borderline TSH " levels.       Social hx   -no smoking  -1-2 x month nsaids  -rare alcohol   -no illicit  -Bike 12 miles a day, weights    Fam Hx  Mother with history colitis - no issues in years, colon polyps  Maternal grandfather  of colon cancer at age 76  Both parents have diverticulitis    Interval history, 2019  In August, started cipro x 2 weeks, flagyl x 2 weeks, lialda and prednisone. Notices increased pain when delays Lialda by a few hours (increased pain). Now down to prednisone 10 mg daily, going to 5 mg daily on Friday. Feels much improved. Blood has stopped; left sided pain has improved. Gained 5 lb since August. Continues on SCD; met with Rebekah Browne.    No upcoming travel plans.     Interval history, 2019  Continues on SCD, which has helped a lot.     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   Off prednisone x 3 weeks. Continues on Cortenema daily x 2 months.   Also on Lialda 4.8 g daily.     Having 2 BMs per day (non-bloody, well-formed). No blood in stool since August.   Gained a few lb since Sep.     Interval history, 2020 (virtual visit)  Got sick in Dec with wheezing and was dx with walking PNA . Treated with abx and then prednisone x 5 days.   Continues on 5 mg prednisone daily and has difficulty weaning off it due to recurrent abd pain. Has been on prednisone 5 mg x 1 month.     Having 2 BMs per day, non bloody, loose.     For SCAD, currently on Lialda 4.8g and Rowasa enemas.     Interval history, 2020 (virtual visit)  In beginning of December, had a flare in the setting of stress, with increased bowel frequency and LLQ abd pain. Started on Rowasa enemas with significant help. But around  ate a piece of cake and developed postprandial nausea and epigastric discomfort.      Continues on Lialda 4.8g daily and daily Rowasa enemas.     Currently having 2 BMs per day, soft. No blood.     EGD in 2019 was normal.     Interval history, 3/24/22  She has continued with SCD (since  Aug 2019) and feels that symptoms are quite stable. She continues with lialda 4.8g daily and rowasa enemas every other night. Currently having 2 stools per day, formed no blood. No abdominal pain. If she deviates from her diet, then she may have some deviation from her baseline, but overall very stable.     She has bad migraines and is currently has one today.    Interval hx 10/26/22  Symptoms over the last 6 months. 1-3 stools per day, slightly looser than normal, variable consistency from more firm to looser stools.  No blood in the stool.  No urgency or nighttime stool.  She has been consistent and compliant with lialda and rowasa. Over the last 6 months she has had discomfort in lower left quadrant. Very tired, minimal appetite. Trying to minimize grains and sugar.    A lot of stress with father in law passing and coworker passing suddenly. Submitted message through portal to get rx for steroids and antibiotics which has worked well in the past.     Interval hx 3/29/23  Cipro/flagyl allowed for improvement (rifaximin denied). Has been quite stable other than 2 weeks at end of Feb 2023. Bowel pattern fluctuates with what she perceives to be her stress level. Avg 2 BM daily that are formed. No blood in the stool. She continues with lialda 4.8g daily and rowasa daily.    ROS:    No fevers or chills  No weight loss  No blurry vision, double vision or change in vision  No sore throat  No lymphadenopathy  No shortness of breath or wheezing  No chest pain or pressure  No arthralgias or myalgias  No rashes or skin changes  No dysphagia  No BRBPR, hematochezia  No dysuria, frequency or urgency  No hot/cold intolerance or polyria  No anxiety or depression    Extra intestinal manifestations of IBD:  No uveitis/episcleritis  No aphthous ulcers   No arthritis   No erythema nodosum/pyoderma gangrenosum.     PERTINENT PAST MEDICAL HISTORY:  None     PREVIOUS SURGERIES:  Past Surgical History:   Procedure Laterality Date      CHOLECYSTECTOMY       COLONOSCOPY       COLONOSCOPY N/A 8/12/2019    Procedure: COLONOSCOPY, WITH POLYPECTOMY AND BIOPSY;  Surgeon: Yolanda Molina MD;  Location: [facility] OR     HC REMOVAL GALLBLADDER      Description: Cholecystectomy;  Recorded: 04/06/2011;     Surgical hx  -Cholecystectomy 2010  -3 children - vaginal x 3, Bladder sling placed 2016    PREVIOUS ENDOSCOPY:  See above    ALLERGIES:     Allergies   Allergen Reactions     Sulfa Drugs Hives     Other reaction(s): *Unknown       PERTINENT MEDICATIONS:    Current Outpatient Medications:      amitriptyline (ELAVIL) 10 MG tablet, Take 10 mg by mouth, Disp: , Rfl:      butalbital-acetaminophen-caffeine (FIORICET/ESGIC) -40 MG tablet, Take 1 tablet by mouth, Disp: , Rfl:      mesalamine (LIALDA) 1.2 g DR tablet, Take 4 tablets (4,800 mg) by mouth daily, Disp: 360 tablet, Rfl: 3     mesalamine (ROWASA) 4 g enema, Place 1 enema (4 g) rectally At Bedtime, Disp: 90 enema, Rfl: 1     Multiple Vitamins-Iron (MULTIVITAMIN/IRON PO), Take 1 tablet by mouth, Disp: , Rfl:      ACIDOPHILUS LACTOBACILLUS PO, Take 1 capsule by mouth (Patient not taking: Reported on 3/29/2023), Disp: , Rfl:      albuterol (PROAIR HFA/PROVENTIL HFA/VENTOLIN HFA) 108 (90 Base) MCG/ACT inhaler, , Disp: , Rfl:      budesonide (UCERIS) 9 MG 24 hr tablet, Take 1 tablet (9 mg) by mouth every morning For 30 days, then every other day for two weeks then stop (Patient not taking: Reported on 12/30/2020), Disp: 37 tablet, Rfl: 0     ciprofloxacin (CIPRO) 500 MG tablet, Take 1 tablet (500 mg) by mouth 2 times daily (Patient not taking: Reported on 3/29/2023), Disp: 28 tablet, Rfl: 0     hydrocortisone (CORTENEMA) 100 MG/60ML enema, Place 1 enema rectally At Bedtime (Patient not taking: Reported on 3/29/2023), Disp: 30 enema, Rfl: 0     metroNIDAZOLE (FLAGYL) 250 MG tablet, Take 1 tablet (250 mg) by mouth 3 times daily (Patient not taking: Reported on 3/29/2023), Disp: 42 tablet, Rfl: 0     omeprazole  (PRILOSEC) 40 MG DR capsule, Take 1 capsule (40 mg) by mouth daily (Patient not taking: Reported on 10/26/2022), Disp: 90 capsule, Rfl: 1    SOCIAL HISTORY:  Social History     Socioeconomic History     Marital status:      Spouse name: Not on file     Number of children: Not on file     Years of education: Not on file     Highest education level: Not on file   Occupational History     Not on file   Tobacco Use     Smoking status: Never     Smokeless tobacco: Never   Substance and Sexual Activity     Alcohol use: Yes     Comment: occasional     Drug use: Never     Sexual activity: Not on file   Other Topics Concern     Parent/sibling w/ CABG, MI or angioplasty before 65F 55M? Not Asked   Social History Narrative     Not on file     Social Determinants of Health     Financial Resource Strain: Not on file   Food Insecurity: Not on file   Transportation Needs: Not on file   Physical Activity: Not on file   Stress: Not on file   Social Connections: Not on file   Intimate Partner Violence: Not on file   Housing Stability: Not on file     FAMILY HISTORY:  Family History   Problem Relation Age of Onset     Diverticulitis Father      Inflammatory Bowel Disease No family hx of      Colon Cancer No family hx of      Coronary Artery Disease Father      Cerebrovascular Disease Maternal Grandfather      Cerebrovascular Disease Paternal Grandfather        PHYSICAL EXAMINATION:  Constitutional: aaox3, cooperative, pleasant, not dyspneic/diaphoretic, no acute distress  Vitals reviewed: There were no vitals taken for this visit.  Wt:   Wt Readings from Last 2 Encounters:   11/22/22 59 kg (130 lb)   10/26/22 59.9 kg (132 lb)      General appearance  Healthy appearing adult, in no acute distress     Eyes  Sclera anicteric  Pupils round and reactive to light     Ears, nose, mouth and throat  No obvious external lesions of ears and nose  Hearing intact     Neck  Symmetric  No obvious external lesions     Respiratory  Normal  respiration, no use of accessory muscles      MSK  Gait normal     Skin  No rashes or jaundice      Psychiatric  Oriented to person, place and time  Appropriate mood and affect.     PERTINENT STUDIES:    Most recent hepatic panel:  Recent Labs   Lab Test 06/25/21  1229 07/22/19  0000   ALT 15 16   AST 15 19     Most recent creatinine:  Recent Labs   Lab Test 06/25/21  1229 07/22/19  0000   CR 0.69 0.72                             Again, thank you for allowing me to participate in the care of your patient.        Sincerely,        Gerhard Nicolas PA-C

## 2023-04-09 ENCOUNTER — HEALTH MAINTENANCE LETTER (OUTPATIENT)
Age: 48
End: 2023-04-09

## 2023-04-11 DIAGNOSIS — K57.30 SEGMENTAL COLITIS ASSOCIATED WITH DIVERTICULOSIS (H): ICD-10-CM

## 2023-04-11 DIAGNOSIS — K50.10 SEGMENTAL COLITIS ASSOCIATED WITH DIVERTICULOSIS (H): ICD-10-CM

## 2023-04-13 ENCOUNTER — MYC MEDICAL ADVICE (OUTPATIENT)
Dept: GASTROENTEROLOGY | Facility: CLINIC | Age: 48
End: 2023-04-13
Payer: COMMERCIAL

## 2023-04-13 ENCOUNTER — PATIENT OUTREACH (OUTPATIENT)
Dept: GASTROENTEROLOGY | Facility: CLINIC | Age: 48
End: 2023-04-13
Payer: COMMERCIAL

## 2023-04-13 DIAGNOSIS — K50.10 SEGMENTAL COLITIS ASSOCIATED WITH DIVERTICULOSIS (H): Primary | ICD-10-CM

## 2023-04-13 DIAGNOSIS — K57.30 SEGMENTAL COLITIS ASSOCIATED WITH DIVERTICULOSIS (H): Primary | ICD-10-CM

## 2023-04-13 RX ORDER — MESALAMINE 1.2 G/1
4800 TABLET, DELAYED RELEASE ORAL DAILY
Qty: 360 TABLET | OUTPATIENT
Start: 2023-04-13

## 2023-04-13 NOTE — TELEPHONE ENCOUNTER
MESALAMINE DR 1.2 GM TABLET  Last Written Prescription Date:  3/29/2023  Last Fill Quantity: 5400,   # refills: 1  Last Office Visit :  3/29/2023  Future Office visit:   9/27/2023    Routing refill request to provider for review/approval because:  Needing updated Labs (ALT, CBC, CR)   Refer to clinic for review     Recent Labs   Lab Test 06/25/21  1229   ALT 15       Normal CBC on file within past 12 months        Recent Labs   Lab Test 03/18/19  1652   WBC 10.7   RBC 3.94   HGB 11.4*   HCT 35.1              Normal Serum Creatinine on file within past 12 months        Recent Labs   Lab Test 06/25/21  1229   CR 0.69       Olesya Funk RN  Central Triage Red Flags/Med Refills

## 2023-04-13 NOTE — TELEPHONE ENCOUNTER
Needs a refill of meslamine Lialda   Last creatinine was in June of 2021      Creatinine lab order was placed in March at time of gi appt   Message to patient no refills until lab completed

## 2023-04-14 RX ORDER — MESALAMINE 1.2 G/1
4800 TABLET, DELAYED RELEASE ORAL
Qty: 40 TABLET | Refills: 0 | Status: SHIPPED | OUTPATIENT
Start: 2023-04-14 | End: 2023-04-24

## 2023-04-18 NOTE — PATIENT INSTRUCTIONS
Asymptomatic,    ·  >136  · Likely secondary to dehydration and diarrhea  · IV bolus and maintenance fluids given  · Trend CMP in a m    · Consult nephro if unresolved It was a pleasure taking care of you today.  I've included a brief summary of our discussion and care plan from today's visit below.  Please review this information with your primary care provider.  ______________________________________________________________________    My recommendations are summarized as follows:    -- Labs + stool sample when able  The order for this is in, can be completed at our lab. To schedule lab appointment here, use my chart or call [phone].       Return to GI Clinic in 12 months to review your progress.    ______________________________________________________________________    How do I schedule labs, imaging studies, or procedures that were ordered in clinic today?     Labs: To schedule lab appointment at the [facility], use my chart or call [phone]. If you have a [facility] lab closer to home where you are regularly seen you can give them a call.     Procedures: If a colonoscopy, upper endoscopy, breath test, esophageal manometry, or pH impedence was ordered today, our endoscopy team will call you to schedule this. If you have not heard from our endoscopy team within a week, please call [phone] to schedule.     Imaging Studies: If you were scheduled for a CT scan, X-ray, MRI, ultrasound, HIDA scan or other imaging study, please call [phone] to have this scheduled.     Referral: If a referral to another specialty was ordered, expect a phone call or follow instructions above. If you have not heard from anyone regarding your referral in a week, please call our clinic to check the status.     Who do I call with any questions after my visit?  Please be in touch if there are any further questions that arise following today's visit.  There are multiple ways to contact your gastroenterology care team.        During business hours, you may reach a Gastroenterology nurse at [phone]      To schedule or reschedule an appointment, please call [phone].        You can always send a secure message through [employer].  [employer] messages are answered by your nurse or doctor typically within 24 hours.  Please allow extra time on weekends and holidays.        For urgent/emergent questions after business hours, you may reach the on-call GI Fellow by contacting the [facility]  at [phone].     How will I get the results of any tests ordered?    You will receive all of your results.  If you have signed up for [email or web address], any tests ordered at your visit will be available to you after your physician reviews them.  Typically this takes 1-2 weeks.  If there are urgent results that require a change in your care plan, your physician or nurse will call you to discuss the next steps.      What is [employer]?  [employer]con is a secure way for you to access all of your healthcare records from the [facility].  It is a web based computer program, so you can sign on to it from any location.  It also allows you to send secure messages to your care team.  I recommend signing up for Vycon access if you have not already done so and are comfortable with using a computer.         Sincerely,    Gerhard Nicolas PA-C  [facility]  Division of Gastroenterology

## 2023-09-08 ENCOUNTER — TELEPHONE (OUTPATIENT)
Dept: GASTROENTEROLOGY | Facility: CLINIC | Age: 48
End: 2023-09-08
Payer: COMMERCIAL

## 2023-09-08 NOTE — TELEPHONE ENCOUNTER
"Patient calls in reporting moderate to severe pain across her entire lower abdomen. She states she feels like she is \"going into a bad diverticulitis flare\". She is having chills, but has not taken her temperature. Experiencing nausea, but denies vomiting. Poor appetite. Stools are a bit more loose, but denies frequency or blood in the stools. Symptoms started Tuesday and have progressively gotten worse throughout the week.    Patient states she has only been taking her Rowasa enemas. She self-discontinued Lialda in March 2023 because she was due for lab work in order to receive refills and did not do the labs. FCP due to be collected as well.    Discussed patient presentation with Gerhard Nicolas. The location and types of pain accompanied with possible fever is concerning for diverticulitis complication by an abscess. Patient needs imaging for work-up. As we enter into the weekend, if patient spikes a fever or has worsening pain, she should go the ED for evaluation.    Called patient back to relay provider's plan. She understands the criteria for going to the ED. She has not taken her temperature stating she \"cannot get out of bed\". Will plan to touch base on Monday and likely overbook an appointment for her to meet with Gerhard next week. No orders placed at this time; further work-up is warranted before medications can be prescribed.  "

## 2023-09-11 NOTE — TELEPHONE ENCOUNTER
Called patient to check-in to see how symptoms presented over the weekend. No answer; left voicemail with request for call to be returned.

## 2023-09-11 NOTE — TELEPHONE ENCOUNTER
Patient returned call. Symptoms are still present, but manageable enough to stay home throughout the weekend without seeking care. She switched to a low-fiber diet. Plan for overbook appointment with Gerhard tomorrow @ 11:00am.

## 2023-09-12 ENCOUNTER — VIRTUAL VISIT (OUTPATIENT)
Dept: GASTROENTEROLOGY | Facility: CLINIC | Age: 48
End: 2023-09-12
Payer: COMMERCIAL

## 2023-09-12 DIAGNOSIS — K57.30 SEGMENTAL COLITIS ASSOCIATED WITH DIVERTICULOSIS (H): Primary | ICD-10-CM

## 2023-09-12 DIAGNOSIS — K50.10 SEGMENTAL COLITIS ASSOCIATED WITH DIVERTICULOSIS (H): Primary | ICD-10-CM

## 2023-09-12 PROCEDURE — 99214 OFFICE O/P EST MOD 30 MIN: CPT | Mod: 95 | Performed by: PHYSICIAN ASSISTANT

## 2023-09-12 NOTE — LETTER
9/12/2023         RE: Velma Mcfarlane  [address] [id number]        Dear Colleague,    Thank you for referring your patient, Velma Mcfarlane, to the [facility] [place]. Please see a copy of my visit note below.    Virtual Visit Details    Type of service:  Video Visit     Originating Location (pt. Location): Home    Distant Location (provider location):  Off-site  Platform used for Video Visit: [employer]  Start 1116  Stop 1127      IBD CLINIC VISIT -- follow up    CHIEF COMPLAINT: SCAD evaluation and management    SCAD HISTORY  Age at diagnosis: 44  Extent of disease: SCAD, left sided rui-diverticular inflammation  Current SCAD medications: rowasa  ( previously on Lialda 4.8 G daily, self discontinued in March 2023)  Prior SCAD Medications: Cipro/flagyl (helped)    DISEASE ASSESSMENT  Labs:  Recent Labs   Lab Test 06/19/19  1009 06/20/18  1611   CRP <2.9 0.6   SED 9 20     Endoscopic assessment:     8/2019 icscope showed Dee 2 colitis from 30 cm-40cm. Diverticulosis in the sigmoid. Rest of colon was normal.     PATH:  SPECIMEN(S):   A: Cecal biopsy   B: Colon biopsy, ascending   C: Colon biopsy, transverse   D: Colon biopsy, descending   E: Sigmoid colon biopsy   F: Rectal biopsy     FINAL DIAGNOSIS:   A. Cecum, biopsy:   Colonic mucosa with no pathologic abnormalities     B. Ascending Colon, Biopsy:   Colonic mucosa with no pathologic abnormalities     C. Transverse Colon, biopsy:   Colonic mucosa with no pathologic abnormalities     D. Descending Colon, biopsy:   Colonic mucosa with no pathologic abnormalities     E. Sigmoid Colon, biopsy:   Colitis with crypt injury, moderately active, with rectum sparing (see   part F below); consistent with SCAD   (segmental colitisassociated diverticulosis); negative for dysplasia     F. Rectum, biopsy:   Rectal mucosa with no pathologic abnormalities     EGD 4/15/19 Normal, normal biopsies of  esophagus, stomach and duodenum  3/25/19 colonoscopy for rectal bleeding: left sided diverticular with edema, erythema, loss of vascular pattern noted in 2 segments (40-36 cm and 30-22 cm from the anus.    CT ab/pelvis 3/5/19:  Renal hypodensities stable from 4/16/19 exam, 2 mm calculus in right kidney, no gastric or small bowel abnormality  Fecal calprotectin: pending  C diff: negative per OSH records    ASSESSMENT/PLAN  Mrs. Salter is a 48 year old woman with history of cholecystectomy, 3 uncomplicated vaginal deliveries, thyroid cysts (per patient), and segmental colitis associated with diverticular disease who is following up for management of her disease and recent episode of abdominal pain.    We will proceed with the following plan:     Plan:  --- Colonoscopy in 8 weeks  --- Continue rowasa enemas, pending scope finding, may restart lialda  --- CBC, LFTs, CRP, ESR    Return to clinic in 6 months    Thank you for this consultation.    It was a pleasure to participate in the care of this patient; please contact us with any further questions.      Gerhard Nicolas PA-C  Division of Gastroenterology, Hepatology and Nutrition  [facility]      HPI:   Mrs. Salter is a 48 year old woman with history of cholecystectomy, 3 uncomplicated vaginal deliveries, thyroid cysts (per patient), and recent diagnosis of segmental colitis associated with diverticular disease who is establishing care for management of her disease.    Per chart:  Patient seen about 2014 at [facility] for diverticulitis and last seen 5/2019 for follow up of SCAD and nausea.  Patient had subtle symptoms of Patient had colonoscopy 3/2019 with finding of edema, erythema, loss of and loss of vascular patter in 2 segments of left colon with mild to moderate diverticulosis.  Sigmoid biopsies showed mildly active chronic colitis consistent with SCAD.  Patient was treated with 10 day course of cipro/flagyl with improvement in symptoms followed by Heather  "4.8 G daily with possible improvement. On 4/11/19 the patient was found to be c.diff negative.      At time of consultation, patient reported frustation with lack of symptom improvement and communication at prior GI providers office. She presented here.  Patient reports 5 years of alternating diarrhea and constipation along with occassional undigested food in stool and borrygymi.  She reports had initial improvement in symptoms following cipro/flagyl and mesalamine but called in as still was having blood streaked stools for which she was started on rowasa enemas.  She is concerned she is still flaring despite the therapies and putting herself on a low residue \"simple carbohydrate diet\" supplemented with \"high protein predigested cancer shakes\".  She reports weight loss in the setting of biking 12 miles daily and changed diet.    At time of consultation the patient reports two semiformed stools daily with blood streaks in all stools (10% blood) and with wiping.  She notes one day a week she will have bloating, increased flatus, general malaise, 5-6 watery BM's along with no associated abdominal pain.      Patient also reports 10 years of chronic food getting stuck in her esophagus until drinks some water with the food.  It doesn't happen with draper, but rather typically with meats.  She has never had a food impaction.  Happens with every meal.  No odynophagia, no nausea currently, no vomiting, rare reflux/heartburn. PPI not helpful in the past.  Reports was having nausea but improved with simple carbohydrate diet.  Reports when was nauseated, it was typically in the morning and improved with a protein breakfast sandwich.    Prior report of dysphagia and nausea,  Had upper endoscopy April 15th 2019, 1 gastric polyp (fundic gland).  Separate mid/distal esophageal, gastric and duodenal biopsies negative.    Patient notes Hx of thyroid cysts and borderline TSH levels.       Social hx   -no smoking  -1-2 x month " nsaids  -rare alcohol   -no illicit  -Bike 12 miles a day, weights    Fam Hx  Mother with history colitis - no issues in years, colon polyps  Maternal grandfather  of colon cancer at age 76  Both parents have diverticulitis    Interval history, 2019  In August, started cipro x 2 weeks, flagyl x 2 weeks, lialda and prednisone. Notices increased pain when delays Lialda by a few hours (increased pain). Now down to prednisone 10 mg daily, going to 5 mg daily on Friday. Feels much improved. Blood has stopped; left sided pain has improved. Gained 5 lb since August. Continues on SCD; met with Rebekah Browne.    No upcoming travel plans.     Interval history, 2019  Continues on SCD, which has helped a lot.     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   Off prednisone x 3 weeks. Continues on Cortenema daily x 2 months.   Also on Lialda 4.8 g daily.     Having 2 BMs per day (non-bloody, well-formed). No blood in stool since August.   Gained a few lb since Sep.     Interval history, 2020 (virtual visit)  Got sick in Dec with wheezing and was dx with walking PNA . Treated with abx and then prednisone x 5 days.   Continues on 5 mg prednisone daily and has difficulty weaning off it due to recurrent abd pain. Has been on prednisone 5 mg x 1 month.     Having 2 BMs per day, non bloody, loose.     For SCAD, currently on Lialda 4.8g and Rowasa enemas.     Interval history, 2020 (virtual visit)  In beginning of December, had a flare in the setting of stress, with increased bowel frequency and LLQ abd pain. Started on Rowasa enemas with significant help. But around  ate a piece of cake and developed postprandial nausea and epigastric discomfort.      Continues on Lialda 4.8g daily and daily Rowasa enemas.     Currently having 2 BMs per day, soft. No blood.     EGD in 2019 was normal.     Interval history, 3/24/22  She has continued with SCD (since Aug 2019) and feels that symptoms are quite stable.  She continues with lialda 4.8g daily and rowasa enemas every other night. Currently having 2 stools per day, formed no blood. No abdominal pain. If she deviates from her diet, then she may have some deviation from her baseline, but overall very stable.     She has bad migraines and is currently has one today.    Interval hx 10/26/22  Symptoms over the last 6 months. 1-3 stools per day, slightly looser than normal, variable consistency from more firm to looser stools.  No blood in the stool.  No urgency or nighttime stool.  She has been consistent and compliant with lialda and rowasa. Over the last 6 months she has had discomfort in lower left quadrant. Very tired, minimal appetite. Trying to minimize grains and sugar.    A lot of stress with father in law passing and coworker passing suddenly. Submitted message through portal to get rx for steroids and antibiotics which has worked well in the past.     Interval hx 3/29/23  Cipro/flagyl allowed for improvement (rifaximin denied). Has been quite stable other than 2 weeks at end of Feb 2023. Bowel pattern fluctuates with what she perceives to be her stress level. Avg 2 BM daily that are formed. No blood in the stool. She continues with lialda 4.8g daily and rowasa daily.    Interval hx 9/12/23  Earlier last week began having abdominal discomfort in lower abdomen.  Over the weekend had moderate to severe pain across abdomen. Had chills, but did not take temp.Only doing rowasa enemas, self d/c'd lialda March 2023 bc due for blood work but ultimately did not complete. Rested and hydrated and seems to be improving. LLQ pain is still there and slightly worse than normal but improving. No fever or chills.    ROS:    No fevers or chills  No weight loss  No blurry vision, double vision or change in vision  No sore throat  No lymphadenopathy  No shortness of breath or wheezing  No chest pain or pressure  No arthralgias or myalgias  No rashes or skin changes  No dysphagia  No  BRBPR, hematochezia  No dysuria, frequency or urgency  No hot/cold intolerance or polyria  No anxiety or depression    Extra intestinal manifestations of IBD:  No uveitis/episcleritis  No aphthous ulcers   No arthritis   No erythema nodosum/pyoderma gangrenosum.     PERTINENT PAST MEDICAL HISTORY:  None     PREVIOUS SURGERIES:  Past Surgical History:   Procedure Laterality Date    CHOLECYSTECTOMY      COLONOSCOPY      COLONOSCOPY N/A 8/12/2019    Procedure: COLONOSCOPY, WITH POLYPECTOMY AND BIOPSY;  Surgeon: Yolanda Molina MD;  Location: [facility] OR    HC REMOVAL GALLBLADDER      Description: Cholecystectomy;  Recorded: 04/06/2011;     Surgical hx  -Cholecystectomy 2010  -3 children - vaginal x 3, Bladder sling placed 2016    PREVIOUS ENDOSCOPY:  See above    ALLERGIES:     Allergies   Allergen Reactions    Sulfa Antibiotics Hives     Other reaction(s): *Unknown       PERTINENT MEDICATIONS:    Current Outpatient Medications:     ACIDOPHILUS LACTOBACILLUS PO, Take 1 capsule by mouth (Patient not taking: Reported on 3/29/2023), Disp: , Rfl:     albuterol (PROAIR HFA/PROVENTIL HFA/VENTOLIN HFA) 108 (90 Base) MCG/ACT inhaler, , Disp: , Rfl:     amitriptyline (ELAVIL) 10 MG tablet, Take 10 mg by mouth, Disp: , Rfl:     budesonide (UCERIS) 9 MG 24 hr tablet, Take 1 tablet (9 mg) by mouth every morning For 30 days, then every other day for two weeks then stop (Patient not taking: Reported on 12/30/2020), Disp: 37 tablet, Rfl: 0    butalbital-acetaminophen-caffeine (FIORICET/ESGIC) -40 MG tablet, Take 1 tablet by mouth, Disp: , Rfl:     ciprofloxacin (CIPRO) 500 MG tablet, Take 1 tablet (500 mg) by mouth 2 times daily (Patient not taking: Reported on 3/29/2023), Disp: 28 tablet, Rfl: 0    hydrocortisone (CORTENEMA) 100 MG/60ML enema, Place 1 enema rectally At Bedtime (Patient not taking: Reported on 3/29/2023), Disp: 30 enema, Rfl: 0    mesalamine (LIALDA) 1.2 g DR tablet, Take 4 tablets (4,800 mg) by mouth daily, Disp:  360 tablet, Rfl: 3    mesalamine (ROWASA) 4 g enema, Place 1 enema (4 g) rectally At Bedtime, Disp: 5400 mL, Rfl: 1    metroNIDAZOLE (FLAGYL) 250 MG tablet, Take 1 tablet (250 mg) by mouth 3 times daily (Patient not taking: Reported on 3/29/2023), Disp: 42 tablet, Rfl: 0    Multiple Vitamins-Iron (MULTIVITAMIN/IRON PO), Take 1 tablet by mouth, Disp: , Rfl:     omeprazole (PRILOSEC) 40 MG DR capsule, Take 1 capsule (40 mg) by mouth daily (Patient not taking: Reported on 10/26/2022), Disp: 90 capsule, Rfl: 1    SOCIAL HISTORY:  Social History     Socioeconomic History    Marital status:      Spouse name: Not on file    Number of children: Not on file    Years of education: Not on file    Highest education level: Not on file   Occupational History    Not on file   Tobacco Use    Smoking status: Never    Smokeless tobacco: Never   Substance and Sexual Activity    Alcohol use: Yes     Comment: occasional    Drug use: Never    Sexual activity: Not on file   Other Topics Concern    Parent/sibling w/ CABG, MI or angioplasty before 65F 55M? Not Asked   Social History Narrative    Not on file     Social Determinants of Health     Financial Resource Strain: Not on file   Food Insecurity: Not on file   Transportation Needs: Not on file   Physical Activity: Not on file   Stress: Not on file   Social Connections: Not on file   Intimate Partner Violence: Not on file   Housing Stability: Not on file     FAMILY HISTORY:  Family History   Problem Relation Age of Onset    Diverticulitis Father     Inflammatory Bowel Disease No family hx of     Colon Cancer No family hx of     Coronary Artery Disease Father     Cerebrovascular Disease Maternal Grandfather     Cerebrovascular Disease Paternal Grandfather        PHYSICAL EXAMINATION:  Constitutional: aaox3, cooperative, pleasant, not dyspneic/diaphoretic, no acute distress  Vitals reviewed: There were no vitals taken for this visit.  Wt:   Wt Readings from Last 2 Encounters:    11/22/22 59 kg (130 lb)   10/26/22 59.9 kg (132 lb)      General appearance  Healthy appearing adult, in no acute distress     Eyes  Sclera anicteric  Pupils round and reactive to light     Ears, nose, mouth and throat  No obvious external lesions of ears and nose  Hearing intact     Neck  Symmetric  No obvious external lesions     Respiratory  Normal respiration, no use of accessory muscles      MSK  Gait normal     Skin  No rashes or jaundice      Psychiatric  Oriented to person, place and time  Appropriate mood and affect.     PERTINENT STUDIES:    Most recent hepatic panel:  Recent Labs   Lab Test 06/25/21  1229 07/22/19  0000   ALT 15 16   AST 15 19     Most recent creatinine:  Recent Labs   Lab Test 06/25/21  1229 07/22/19  0000   CR 0.69 0.72           Again, thank you for allowing me to participate in the care of your patient.      Sincerely,    Gerhard Nicolas PA-C

## 2023-09-12 NOTE — PROGRESS NOTES
Virtual Visit Details    Type of service:  Video Visit     Originating Location (pt. Location): Home    Distant Location (provider location):  Off-site  Platform used for Video Visit: [employer]  Start 1116  Stop 1127      IBD CLINIC VISIT -- follow up    CHIEF COMPLAINT: SCAD evaluation and management    SCAD HISTORY  Age at diagnosis: 44  Extent of disease: SCAD, left sided rui-diverticular inflammation  Current SCAD medications: rowasa  ( previously on Lialda 4.8 G daily, self discontinued in March 2023)  Prior SCAD Medications: Cipro/flagyl (helped)    DISEASE ASSESSMENT  Labs:  Recent Labs   Lab Test 06/19/19  1009 06/20/18  1611   CRP <2.9 0.6   SED 9 20     Endoscopic assessment:     8/2019 icscope showed Dee 2 colitis from 30 cm-40cm. Diverticulosis in the sigmoid. Rest of colon was normal.     PATH:  SPECIMEN(S):   A: Cecal biopsy   B: Colon biopsy, ascending   C: Colon biopsy, transverse   D: Colon biopsy, descending   E: Sigmoid colon biopsy   F: Rectal biopsy     FINAL DIAGNOSIS:   A. Cecum, biopsy:   Colonic mucosa with no pathologic abnormalities     B. Ascending Colon, Biopsy:   Colonic mucosa with no pathologic abnormalities     C. Transverse Colon, biopsy:   Colonic mucosa with no pathologic abnormalities     D. Descending Colon, biopsy:   Colonic mucosa with no pathologic abnormalities     E. Sigmoid Colon, biopsy:   Colitis with crypt injury, moderately active, with rectum sparing (see   part F below); consistent with SCAD   (segmental colitisassociated diverticulosis); negative for dysplasia     F. Rectum, biopsy:   Rectal mucosa with no pathologic abnormalities     EGD 4/15/19 Normal, normal biopsies of esophagus, stomach and duodenum  3/25/19 colonoscopy for rectal bleeding: left sided diverticular with edema, erythema, loss of vascular pattern noted in 2 segments (40-36 cm and 30-22 cm from the anus.    CT ab/pelvis 3/5/19:  Renal hypodensities stable from 4/16/19 exam, 2 mm calculus in right  kidney, no gastric or small bowel abnormality  Fecal calprotectin: pending  C diff: negative per OSH records    ASSESSMENT/PLAN  Mrs. Salter is a 48 year old woman with history of cholecystectomy, 3 uncomplicated vaginal deliveries, thyroid cysts (per patient), and segmental colitis associated with diverticular disease who is following up for management of her disease and recent episode of abdominal pain.    We will proceed with the following plan:     Plan:  --- Colonoscopy in 8 weeks  --- Continue rowasa enemas, pending scope finding, may restart lialda  --- CBC, LFTs, CRP, ESR    Return to clinic in 6 months    Thank you for this consultation.    It was a pleasure to participate in the care of this patient; please contact us with any further questions.      Gerhard Nicolas PA-C  Division of Gastroenterology, Hepatology and Nutrition  [facility]      HPI:   Mrs. Salter is a 48 year old woman with history of cholecystectomy, 3 uncomplicated vaginal deliveries, thyroid cysts (per patient), and recent diagnosis of segmental colitis associated with diverticular disease who is establishing care for management of her disease.    Per chart:  Patient seen about 2014 at [facility] for diverticulitis and last seen 5/2019 for follow up of SCAD and nausea.  Patient had subtle symptoms of Patient had colonoscopy 3/2019 with finding of edema, erythema, loss of and loss of vascular patter in 2 segments of left colon with mild to moderate diverticulosis.  Sigmoid biopsies showed mildly active chronic colitis consistent with SCAD.  Patient was treated with 10 day course of cipro/flagyl with improvement in symptoms followed by Lialda 4.8 G daily with possible improvement. On 4/11/19 the patient was found to be c.diff negative.      At time of consultation, patient reported frustation with lack of symptom improvement and communication at prior GI providers office. She presented here.  Patient reports 5 years of alternating  "diarrhea and constipation along with occassional undigested food in stool and borrygymi.  She reports had initial improvement in symptoms following cipro/flagyl and mesalamine but called in as still was having blood streaked stools for which she was started on rowasa enemas.  She is concerned she is still flaring despite the therapies and putting herself on a low residue \"simple carbohydrate diet\" supplemented with \"high protein predigested cancer shakes\".  She reports weight loss in the setting of biking 12 miles daily and changed diet.    At time of consultation the patient reports two semiformed stools daily with blood streaks in all stools (10% blood) and with wiping.  She notes one day a week she will have bloating, increased flatus, general malaise, 5-6 watery BM's along with no associated abdominal pain.      Patient also reports 10 years of chronic food getting stuck in her esophagus until drinks some water with the food.  It doesn't happen with draper, but rather typically with meats.  She has never had a food impaction.  Happens with every meal.  No odynophagia, no nausea currently, no vomiting, rare reflux/heartburn. PPI not helpful in the past.  Reports was having nausea but improved with simple carbohydrate diet.  Reports when was nauseated, it was typically in the morning and improved with a protein breakfast sandwich.    Prior report of dysphagia and nausea,  Had upper endoscopy 2019, 1 gastric polyp (fundic gland).  Separate mid/distal esophageal, gastric and duodenal biopsies negative.    Patient notes Hx of thyroid cysts and borderline TSH levels.       Social hx   -no smoking  -1-2 x month nsaids  -rare alcohol   -no illicit  -Bike 12 miles a day, weights    Fam Hx  Mother with history colitis - no issues in years, colon polyps  Maternal grandfather  of colon cancer at age 76  Both parents have diverticulitis    Interval history, 2019  In August, started cipro x 2 weeks, flagyl x " 2 weeks, lialda and prednisone. Notices increased pain when delays Lialda by a few hours (increased pain). Now down to prednisone 10 mg daily, going to 5 mg daily on Friday. Feels much improved. Blood has stopped; left sided pain has improved. Gained 5 lb since August. Continues on SCD; met with Rebekah Browne.    No upcoming travel plans.     Interval history, 11/2019  Continues on SCD, which has helped a lot.     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   Off prednisone x 3 weeks. Continues on Cortenema daily x 2 months.   Also on Lialda 4.8 g daily.     Having 2 BMs per day (non-bloody, well-formed). No blood in stool since August.   Gained a few lb since Sep.     Interval history, 4/2020 (virtual visit)  Got sick in Dec with wheezing and was dx with walking PNA . Treated with abx and then prednisone x 5 days.   Continues on 5 mg prednisone daily and has difficulty weaning off it due to recurrent abd pain. Has been on prednisone 5 mg x 1 month.     Having 2 BMs per day, non bloody, loose.     For SCAD, currently on Lialda 4.8g and Rowasa enemas.     Interval history, 12/2020 (virtual visit)  In beginning of December, had a flare in the setting of stress, with increased bowel frequency and LLQ abd pain. Started on Rowasa enemas with significant help. But around 12/25 ate a piece of cake and developed postprandial nausea and epigastric discomfort.      Continues on Lialda 4.8g daily and daily Rowasa enemas.     Currently having 2 BMs per day, soft. No blood.     EGD in 4/2019 was normal.     Interval history, 3/24/22  She has continued with SCD (since Aug 2019) and feels that symptoms are quite stable. She continues with lialda 4.8g daily and rowasa enemas every other night. Currently having 2 stools per day, formed no blood. No abdominal pain. If she deviates from her diet, then she may have some deviation from her baseline, but overall very stable.     She has bad migraines and is currently has  one today.    Interval hx 10/26/22  Symptoms over the last 6 months. 1-3 stools per day, slightly looser than normal, variable consistency from more firm to looser stools.  No blood in the stool.  No urgency or nighttime stool.  She has been consistent and compliant with lialda and rowasa. Over the last 6 months she has had discomfort in lower left quadrant. Very tired, minimal appetite. Trying to minimize grains and sugar.    A lot of stress with father in law passing and coworker passing suddenly. Submitted message through portal to get rx for steroids and antibiotics which has worked well in the past.     Interval hx 3/29/23  Cipro/flagyl allowed for improvement (rifaximin denied). Has been quite stable other than 2 weeks at end of Feb 2023. Bowel pattern fluctuates with what she perceives to be her stress level. Avg 2 BM daily that are formed. No blood in the stool. She continues with lialda 4.8g daily and rowasa daily.    Interval hx 9/12/23  Earlier last week began having abdominal discomfort in lower abdomen.  Over the weekend had moderate to severe pain across abdomen. Had chills, but did not take temp.Only doing rowasa enemas, self d/c'd lialda March 2023 bc due for blood work but ultimately did not complete. Rested and hydrated and seems to be improving. LLQ pain is still there and slightly worse than normal but improving. No fever or chills.    ROS:    No fevers or chills  No weight loss  No blurry vision, double vision or change in vision  No sore throat  No lymphadenopathy  No shortness of breath or wheezing  No chest pain or pressure  No arthralgias or myalgias  No rashes or skin changes  No dysphagia  No BRBPR, hematochezia  No dysuria, frequency or urgency  No hot/cold intolerance or polyria  No anxiety or depression    Extra intestinal manifestations of IBD:  No uveitis/episcleritis  No aphthous ulcers   No arthritis   No erythema nodosum/pyoderma gangrenosum.     PERTINENT PAST MEDICAL  HISTORY:  None     PREVIOUS SURGERIES:  Past Surgical History:   Procedure Laterality Date    CHOLECYSTECTOMY      COLONOSCOPY      COLONOSCOPY N/A 8/12/2019    Procedure: COLONOSCOPY, WITH POLYPECTOMY AND BIOPSY;  Surgeon: Yolanda Molina MD;  Location: [facility] OR    HC REMOVAL GALLBLADDER      Description: Cholecystectomy;  Recorded: 04/06/2011;     Surgical hx  -Cholecystectomy 2010  -3 children - vaginal x 3, Bladder sling placed 2016    PREVIOUS ENDOSCOPY:  See above    ALLERGIES:     Allergies   Allergen Reactions    Sulfa Antibiotics Hives     Other reaction(s): *Unknown       PERTINENT MEDICATIONS:    Current Outpatient Medications:     ACIDOPHILUS LACTOBACILLUS PO, Take 1 capsule by mouth (Patient not taking: Reported on 3/29/2023), Disp: , Rfl:     albuterol (PROAIR HFA/PROVENTIL HFA/VENTOLIN HFA) 108 (90 Base) MCG/ACT inhaler, , Disp: , Rfl:     amitriptyline (ELAVIL) 10 MG tablet, Take 10 mg by mouth, Disp: , Rfl:     budesonide (UCERIS) 9 MG 24 hr tablet, Take 1 tablet (9 mg) by mouth every morning For 30 days, then every other day for two weeks then stop (Patient not taking: Reported on 12/30/2020), Disp: 37 tablet, Rfl: 0    butalbital-acetaminophen-caffeine (FIORICET/ESGIC) -40 MG tablet, Take 1 tablet by mouth, Disp: , Rfl:     ciprofloxacin (CIPRO) 500 MG tablet, Take 1 tablet (500 mg) by mouth 2 times daily (Patient not taking: Reported on 3/29/2023), Disp: 28 tablet, Rfl: 0    hydrocortisone (CORTENEMA) 100 MG/60ML enema, Place 1 enema rectally At Bedtime (Patient not taking: Reported on 3/29/2023), Disp: 30 enema, Rfl: 0    mesalamine (LIALDA) 1.2 g DR tablet, Take 4 tablets (4,800 mg) by mouth daily, Disp: 360 tablet, Rfl: 3    mesalamine (ROWASA) 4 g enema, Place 1 enema (4 g) rectally At Bedtime, Disp: 5400 mL, Rfl: 1    metroNIDAZOLE (FLAGYL) 250 MG tablet, Take 1 tablet (250 mg) by mouth 3 times daily (Patient not taking: Reported on 3/29/2023), Disp: 42 tablet, Rfl: 0    Multiple  Vitamins-Iron (MULTIVITAMIN/IRON PO), Take 1 tablet by mouth, Disp: , Rfl:     omeprazole (PRILOSEC) 40 MG DR capsule, Take 1 capsule (40 mg) by mouth daily (Patient not taking: Reported on 10/26/2022), Disp: 90 capsule, Rfl: 1    SOCIAL HISTORY:  Social History     Socioeconomic History    Marital status:      Spouse name: Not on file    Number of children: Not on file    Years of education: Not on file    Highest education level: Not on file   Occupational History    Not on file   Tobacco Use    Smoking status: Never    Smokeless tobacco: Never   Substance and Sexual Activity    Alcohol use: Yes     Comment: occasional    Drug use: Never    Sexual activity: Not on file   Other Topics Concern    Parent/sibling w/ CABG, MI or angioplasty before 65F 55M? Not Asked   Social History Narrative    Not on file     Social Determinants of Health     Financial Resource Strain: Not on file   Food Insecurity: Not on file   Transportation Needs: Not on file   Physical Activity: Not on file   Stress: Not on file   Social Connections: Not on file   Intimate Partner Violence: Not on file   Housing Stability: Not on file     FAMILY HISTORY:  Family History   Problem Relation Age of Onset    Diverticulitis Father     Inflammatory Bowel Disease No family hx of     Colon Cancer No family hx of     Coronary Artery Disease Father     Cerebrovascular Disease Maternal Grandfather     Cerebrovascular Disease Paternal Grandfather        PHYSICAL EXAMINATION:  Constitutional: aaox3, cooperative, pleasant, not dyspneic/diaphoretic, no acute distress  Vitals reviewed: There were no vitals taken for this visit.  Wt:   Wt Readings from Last 2 Encounters:   11/22/22 59 kg (130 lb)   10/26/22 59.9 kg (132 lb)      General appearance  Healthy appearing adult, in no acute distress     Eyes  Sclera anicteric  Pupils round and reactive to light     Ears, nose, mouth and throat  No obvious external lesions of ears and nose  Hearing intact      Neck  Symmetric  No obvious external lesions     Respiratory  Normal respiration, no use of accessory muscles      MSK  Gait normal     Skin  No rashes or jaundice      Psychiatric  Oriented to person, place and time  Appropriate mood and affect.     PERTINENT STUDIES:    Most recent hepatic panel:  Recent Labs   Lab Test 06/25/21  1229 07/22/19  0000   ALT 15 16   AST 15 19     Most recent creatinine:  Recent Labs   Lab Test 06/25/21  1229 07/22/19  0000   CR 0.69 0.72

## 2023-09-12 NOTE — NURSING NOTE
Is the patient currently in the state of [place]? YES    Visit mode:VIDEO    If the visit is dropped, the patient can be reconnected by: VIDEO VISIT: Text to cell phone:   Telephone Information:   Mobile [phone]       Will anyone else be joining the visit? NO  (If patient encounters technical issues they should call [phone]    How would you like to obtain your AVS? MyChart    Are changes needed to the allergy or medication list? No    Reason for visit: RECHECK    Basilio CONNOR

## 2023-09-14 ENCOUNTER — TELEPHONE (OUTPATIENT)
Dept: GASTROENTEROLOGY | Facility: CLINIC | Age: 48
End: 2023-09-14
Payer: COMMERCIAL

## 2023-09-14 NOTE — TELEPHONE ENCOUNTER
"Endoscopy Scheduling Screen    Have you had a positive Covid test in the last 14 days?  No    Are you active on MyChart?   Yes    What insurance is in the chart?  Other:  PO    Ordering/Referring Provider:     CHUCHO SAUNDERS      (If ordering provider performs procedure, schedule with ordering provider unless otherwise instructed. )    BMI: Estimated body mass index is 20.36 kg/m  as calculated from the following:    Height as of 11/22/22: 1.702 m (5' 7\").    Weight as of 11/22/22: 59 kg (130 lb).     Sedation Ordered  moderate sedation.   If patient BMI > 50 do not schedule in [facility].    If patient BMI > 45 do not schedule at [facility].    Are you taking methadone or Suboxone?  No    Are you taking any prescription medications for pain 3 or more times per week?   No    Do you have a history of malignant hyperthermia or adverse reaction to anesthesia?  No    (Females) Are you currently pregnant?   No     Have you been diagnosed or told you have pulmonary hypertension?   No    Do you have an LVAD?  No    Have you been told you have moderate to severe sleep apnea?  No    Have you been told you have COPD, asthma, or any other lung disease?  Yes     What breathing problems do you have?  Asthma     Do you use home oxygen?  No    Have your breathing problems required an ED visit or hospitalization in the last year?  No    Do you have any heart conditions?  No     Have you ever had an organ transplant?   No    Have you ever had or are you awaiting a heart or lung transplant?   No    Have you had a stroke or transient ischemic attack (TIA aka \"mini stroke\" in the last 6 months?   No    Have you been diagnosed with or been told you have cirrhosis of the liver?   No    Are you currently on dialysis?   No    Do you need assistance transferring?   No    BMI: Estimated body mass index is 20.36 kg/m  as calculated from the following:    Height as of 11/22/22: 1.702 m (5' 7\").    Weight as of 11/22/22: 59 kg (130 lb).     Is patients " BMI > 40 and scheduling location [facility]?  No    Do you take an injectable medication for weight loss or diabetes (excluding insulin)?  No    Do you take the medication Naltrexone?  No    Do you take blood thinners?  No       Prep   Are you currently on dialysis or do you have chronic kidney disease?  No    Do you have a diagnosis of diabetes?  No    Do you have a diagnosis of cystic fibrosis (CF)?  No    On a regular basis do you go 3 -5 days between bowel movements?  No    BMI > 40?  No    Preferred Pharmacy:    [facility]/pharmacy #[id number] - [place], [place] - [address] [address]  [address] [address]  Phone: [phone] Fax: [phone]      Final Scheduling Details   Colonoscopy prep sent?  Standard MiraLAX    Procedure scheduled  Colonoscopy    Surgeon:  KAILEY     Date of procedure:  12/20     Pre-OP / PAC:   No - Not required for this site.    Location  [facility] - [facility] - Per order.    Sedation   Moderate Sedation - Per order.      Patient Reminders:   You will receive a call from a Nurse to review instructions and health history.  This assessment must be completed prior to your procedure.  Failure to complete the Nurse assessment may result in the procedure being cancelled.      On the day of your procedure, please designate an adult(s) who can drive you home stay with you for the next 24 hours. The medicines used in the exam will make you sleepy. You will not be able to drive.      You cannot take public transportation, ride share services, or non-medical taxi service without a responsible caregiver.  Medical transport services are allowed with the requirement that a responsible caregiver will receive you at your destination.  We require that drivers and caregivers are confirmed prior to your procedure.

## 2023-09-18 ENCOUNTER — LAB (OUTPATIENT)
Dept: LAB | Facility: CLINIC | Age: 48
End: 2023-09-18
Payer: COMMERCIAL

## 2023-09-18 DIAGNOSIS — K57.30 SEGMENTAL COLITIS ASSOCIATED WITH DIVERTICULOSIS (H): ICD-10-CM

## 2023-09-18 DIAGNOSIS — K50.10 SEGMENTAL COLITIS ASSOCIATED WITH DIVERTICULOSIS (H): ICD-10-CM

## 2023-09-18 LAB
ALBUMIN SERPL BCG-MCNC: 4.6 G/DL (ref 3.5–5.2)
ALP SERPL-CCNC: 71 U/L (ref 35–129)
ALT SERPL W P-5'-P-CCNC: 12 U/L (ref 0–70)
AST SERPL W P-5'-P-CCNC: 17 U/L (ref 0–45)
BASOPHILS # BLD MANUAL: 0.1 10E3/UL (ref 0–0.2)
BASOPHILS NFR BLD MANUAL: 1 %
BILIRUB DIRECT SERPL-MCNC: <0.2 MG/DL (ref 0–0.3)
BILIRUB SERPL-MCNC: 0.4 MG/DL
CRP SERPL-MCNC: 10.5 MG/L
EOSINOPHIL # BLD MANUAL: 5.6 10E3/UL (ref 0–0.7)
EOSINOPHIL NFR BLD MANUAL: 49 %
ERYTHROCYTE [DISTWIDTH] IN BLOOD BY AUTOMATED COUNT: 14.3 % (ref 10–15)
ERYTHROCYTE [SEDIMENTATION RATE] IN BLOOD BY WESTERGREN METHOD: 15 MM/HR (ref 0–20)
HCT VFR BLD AUTO: 45.2 % (ref 35–53)
HGB BLD-MCNC: 15.6 G/DL (ref 11.7–17.7)
LYMPHOCYTES # BLD MANUAL: 2.5 10E3/UL (ref 0.8–5.3)
LYMPHOCYTES NFR BLD MANUAL: 22 %
MCH RBC QN AUTO: 31 PG (ref 26.5–33)
MCHC RBC AUTO-ENTMCNC: 34.5 G/DL (ref 31.5–36.5)
MCV RBC AUTO: 90 FL (ref 78–100)
MONOCYTES # BLD MANUAL: 0.6 10E3/UL (ref 0–1.3)
MONOCYTES NFR BLD MANUAL: 5 %
NEUTROPHILS # BLD MANUAL: 2.6 10E3/UL (ref 1.6–8.3)
NEUTROPHILS NFR BLD MANUAL: 23 %
PLAT MORPH BLD: ABNORMAL
PLATELET # BLD AUTO: 351 10E3/UL (ref 150–450)
PROT SERPL-MCNC: 8 G/DL (ref 6.4–8.3)
RBC # BLD AUTO: 5.04 10E6/UL (ref 3.8–5.9)
RBC MORPH BLD: ABNORMAL
WBC # BLD AUTO: 11.5 10E3/UL (ref 4–11)

## 2023-09-18 PROCEDURE — 80076 HEPATIC FUNCTION PANEL: CPT

## 2023-09-18 PROCEDURE — 82565 ASSAY OF CREATININE: CPT

## 2023-09-18 PROCEDURE — 85652 RBC SED RATE AUTOMATED: CPT

## 2023-09-18 PROCEDURE — 85027 COMPLETE CBC AUTOMATED: CPT

## 2023-09-18 PROCEDURE — 36415 COLL VENOUS BLD VENIPUNCTURE: CPT

## 2023-09-18 PROCEDURE — 86140 C-REACTIVE PROTEIN: CPT

## 2023-09-18 PROCEDURE — 85007 BL SMEAR W/DIFF WBC COUNT: CPT

## 2023-09-19 ENCOUNTER — MYC MEDICAL ADVICE (OUTPATIENT)
Dept: GASTROENTEROLOGY | Facility: CLINIC | Age: 48
End: 2023-09-19
Payer: COMMERCIAL

## 2023-09-19 LAB
CREAT SERPL-MCNC: 0.81 MG/DL (ref 0.51–1.17)
EGFRCR SERPLBLD CKD-EPI 2021: 89 ML/MIN/1.73M2

## 2023-09-20 DIAGNOSIS — K50.119 SEGMENTAL COLITIS WITH COMPLICATION (H): Primary | ICD-10-CM

## 2023-09-21 ENCOUNTER — HOSPITAL ENCOUNTER (OUTPATIENT)
Dept: CT IMAGING | Facility: HOSPITAL | Age: 48
Discharge: HOME | End: 2023-09-21
Attending: PHYSICIAN ASSISTANT | Admitting: PHYSICIAN ASSISTANT
Payer: COMMERCIAL

## 2023-09-21 DIAGNOSIS — K50.119 SEGMENTAL COLITIS WITH COMPLICATION (H): ICD-10-CM

## 2023-09-21 PROCEDURE — 74178 CT ABD&PLV WO CNTR FLWD CNTR: CPT

## 2023-09-21 PROCEDURE — 250N000011 HC RX IP 250 OP 636: Mod: JZ | Performed by: PHYSICIAN ASSISTANT

## 2023-09-21 RX ORDER — IOPAMIDOL 755 MG/ML
90 INJECTION, SOLUTION INTRAVASCULAR ONCE
Status: COMPLETED | OUTPATIENT
Start: 2023-09-21 | End: 2023-09-21

## 2023-09-21 RX ADMIN — IOPAMIDOL 90 ML: 755 INJECTION, SOLUTION INTRAVENOUS at 19:59

## 2023-09-26 DIAGNOSIS — I72.8 SPLENIC ARTERY ANEURYSM (H): Primary | ICD-10-CM

## 2023-10-04 ENCOUNTER — VIRTUAL VISIT (OUTPATIENT)
Dept: VASCULAR SURGERY | Facility: CLINIC | Age: 48
End: 2023-10-04
Payer: COMMERCIAL

## 2023-10-04 DIAGNOSIS — I72.8 SPLENIC ARTERY ANEURYSM (H): ICD-10-CM

## 2023-10-04 PROCEDURE — 99203 OFFICE O/P NEW LOW 30 MIN: CPT | Mod: 95 | Performed by: RADIOLOGY

## 2023-10-04 NOTE — PROGRESS NOTES
INTERVENTIONAL RADIOLOGY CONSULTATION    Name: Velma Mcfarlane  Age: 48 year old   Referring Physician: Dr. Nicolas   REASON FOR REFERRAL: Splenic arterial aneurysm      HPI:   Ms Salter is a 48 year old female with a history of ulcerative colitis seen in clinic for consultation for a splenic arterial aneurysm. Over the summer she was having abdominal symptoms related to her ulcerative colitis and as part of her work-up a CT of the abdomen and pelvis was performed on 9/21/23 which demonstrated a incidental aneurysm of the splenic artery.     She reports that she was previously unaware of this aneurysm and has not had any other aneurysm to which she is aware. Her mother, who was a low volume smoker earlier in life, did have a abdominal aneurysm which was treated with a stent, which she believes was of the aorta. There is no other family history of aneurysms. She is no planning on future pregnancies.     PAST MEDICAL HISTORY:   No past medical history on file.    PAST SURGICAL HISTORY:   Past Surgical History:   Procedure Laterality Date     CHOLECYSTECTOMY       COLONOSCOPY       COLONOSCOPY N/A 8/12/2019    Procedure: COLONOSCOPY, WITH POLYPECTOMY AND BIOPSY;  Surgeon: Yolanda Molina MD;  [facility]     HC REMOVAL GALLBLADDER      Description: Cholecystectomy;  Recorded: 04/06/2011;       FAMILY HISTORY:   Family History   Problem Relation Age of Onset     Diverticulitis Father      Inflammatory Bowel Disease No family hx of      Colon Cancer No family hx of      Coronary Artery Disease Father      Cerebrovascular Disease Maternal Grandfather      Cerebrovascular Disease Paternal Grandfather        SOCIAL HISTORY:   Social History     Tobacco Use     Smoking status: Never     Smokeless tobacco: Never   Substance Use Topics     Alcohol use: Yes     Comment: occasional       PROBLEM LIST:   There are no problems to display for this patient.      MEDICATIONS:   Prescription Medications as of 10/4/2023          Rx Number Disp Refills Start End Last Dispensed Date Next Fill Date Owning Pharmacy    ACIDOPHILUS LACTOBACILLUS PO            Sig: Take 1 capsule by mouth    Class: Historical    Route: Oral    albuterol (PROAIR HFA/PROVENTIL HFA/VENTOLIN HFA) 108 (90 Base) MCG/ACT inhaler    5/10/2019        Class: Historical    amitriptyline (ELAVIL) 10 MG tablet            Sig: Take 10 mg by mouth    Class: Historical    Route: Oral    budesonide (UCERIS) 9 MG 24 hr tablet  37 tablet 0 6/18/2020    [facility]/pharmacy [address] [place] [address]    Sig: Take 1 tablet (9 mg) by mouth every morning For 30 days, then every other day for two weeks then stop    Class: E-Prescribe    Route: Oral    butalbital-acetaminophen-caffeine (FIORICET/ESGIC) -40 MG tablet    9/24/2018        Sig: Take 1 tablet by mouth    Class: Historical    Route: Oral    ciprofloxacin (CIPRO) 500 MG tablet  28 tablet 0 10/27/2022    [facility]/pharmacy [address] [place] [address]    Sig: Take 1 tablet (500 mg) by mouth 2 times daily    Class: E-Prescribe    Route: Oral    hydrocortisone (CORTENEMA) 100 MG/60ML enema  30 enema 0 2/22/2022    [facility]/pharmacy [address] [place] [address]    Sig: Place 1 enema rectally At Bedtime    Class: E-Prescribe    Route: Rectal    mesalamine (LIALDA) 1.2 g DR tablet  360 tablet 3 3/24/2022    [facility]/pharmacy [address] [place] [address]    Sig: Take 4 tablets (4,800 mg) by mouth daily    Class: E-Prescribe    Route: Oral    mesalamine (ROWASA) 4 g enema  5400 mL 1 3/29/2023    [facility]/pharmacy #[address] [place] [address]    Sig: Place 1 enema (4 g) rectally At Bedtime    Class: E-Prescribe    Route: Rectal    metroNIDAZOLE (FLAGYL) 250 MG tablet  42 tablet 0 10/27/2022    [facility]/pharmacy #[address] [place] [address]    Sig: Take 1 tablet (250 mg) by mouth 3 times daily    Class: E-Prescribe    Route: Oral    Multiple Vitamins-Iron  (MULTIVITAMIN/IRON PO)            Sig: Take 1 tablet by mouth    Class: Historical    Route: Oral    omeprazole (PRILOSEC) 40 MG DR capsule  90 capsule 1 12/30/2020    [facility]/pharmacy #[id number] - [place] [address]    Sig: Take 1 capsule (40 mg) by mouth daily    Class: E-Prescribe    Route: Oral            ALLERGIES:   Sulfa antibiotics    ROS:  Negative unless otherwise stated in HPI.      Physical Examination:   VITALS:   There were no vitals taken for this visit.  Patient was seen via a billable video visit  Gen: alert and oriented, no acute distress  HEENT: EOMI, normocephalic  Neck: supple  Labs:    BMP RESULTS:  Lab Results   Component Value Date     06/25/2021    POTASSIUM 4.2 06/25/2021    POTASSIUM 3.5 07/22/2019    CHLORIDE 105 06/25/2021    CO2 24 06/25/2021    ANIONGAP 10 06/25/2021    GLC 98 06/25/2021    GLC 96 07/22/2019    BUN 11 06/25/2021    CR 0.81 09/18/2023    CR 0.72 07/22/2019    GFRESTIMATED 89 09/18/2023    GFRESTIMATED >60 06/25/2021    GFRESTIMATED 101.5 07/22/2019    GFRESTBLACK >60 06/25/2021    JILLIAN 9.1 06/25/2021        CBC RESULTS:  Lab Results   Component Value Date    WBC 11.5 (H) 09/18/2023    RBC 5.04 09/18/2023    HGB 15.6 09/18/2023    HCT 45.2 09/18/2023    MCV 90 09/18/2023    MCH 31.0 09/18/2023    MCHC 34.5 09/18/2023    RDW 14.3 09/18/2023     09/18/2023       INR/PTT:  Lab Results   Component Value Date    INR 1.12 (H) 03/18/2019       Diagnostic studies:   CTA of the liver 9/21/2023 demonstrates a 1 cm aneurysm of the splenic artery at an infundibular branch near the hilum of the spleen         This appeared to be present on CT 3/5/2019 and MRI 5/10/2018 and appears to be similar in size at those timepoints    3/5/2019      MRI 5/10/2018              Assessment   Ms Salter is a 48 year old female with a history of UC found to have a incidentally splenic arterial aneurysm measuring 10-11mm which appears to be stable going to back at least 2018.  Given she is not planning on future childbearing the recommended approach is surveillance imaging.   Plan   Follow up in 1 year with MRA of the abdomen with gadavist   - Will not plan to treat unless > 20% threshold growth     I interviewed the patient with the resident and agree with the assessment and plan. Risks and benefits of treatment vs observation discussed. Will proceed with annual observation given stability of aneurysm over last 5 years.    Carter Delgado MD    CC  Patient Care Team:  No Ref-Primary, Physician as PCP - General  Gerhard Saunders PA-C as Assigned Gastroenterology Provider  GERHARD SAUNDERS      ----- Service Performed and Documented by Resident or Fellow ------             Video-Visit Details     Type of service:  Video Visit     Video Start and End Time: 4:27 pm - 4:54 pm    Originating Location (pt. Location): Home     Distant Location (provider location):  [facility] [place]      Platform used for Video Visit: Ally Dodd MD    Case discussed with staff, Carter Delgado MD

## 2023-10-04 NOTE — LETTER
10/4/2023       RE: Velma Mcfarlane  [address] [address]     Dear Colleague,    Thank you for referring your patient, Velma Mcfarlane, to the [facility] [place] [facility]. Please see a copy of my visit note below.    Virtual Visit Details    Type of service:  Video Visit           INTERVENTIONAL RADIOLOGY CONSULTATION    Name: Velma Mcfarlane  Age: 48 year old   Referring Physician: Dr. Nicolas   REASON FOR REFERRAL: Splenic arterial aneurysm      HPI:   Ms Salter is a 48 year old female with a history of ulcerative colitis seen in clinic for consultation for a splenic arterial aneurysm. Over the summer she was having abdominal symptoms related to her ulcerative colitis and as part of her work-up a CT of the abdomen and pelvis was performed on 9/21/23 which demonstrated a incidental aneurysm of the splenic artery.     She reports that she was previously unaware of this aneurysm and has not had any other aneurysm to which she is aware. Her mother, who was a low volume smoker earlier in life, did have a abdominal aneurysm which was treated with a stent, which she believes was of the aorta. There is no other family history of aneurysms. She is no planning on future pregnancies.     PAST MEDICAL HISTORY:   No past medical history on file.    PAST SURGICAL HISTORY:   Past Surgical History:   Procedure Laterality Date    CHOLECYSTECTOMY      COLONOSCOPY      COLONOSCOPY N/A 8/12/2019    Procedure: COLONOSCOPY, WITH POLYPECTOMY AND BIOPSY;  Surgeon: Yolanda Molina MD;  [facility]    HC REMOVAL GALLBLADDER      Description: Cholecystectomy;  Recorded: 04/06/2011;       FAMILY HISTORY:   Family History   Problem Relation Age of Onset    Diverticulitis Father     Inflammatory Bowel Disease No family hx of     Colon Cancer No family hx of     Coronary Artery Disease Father     Cerebrovascular  Disease Maternal Grandfather     Cerebrovascular Disease Paternal Grandfather        SOCIAL HISTORY:   Social History     Tobacco Use    Smoking status: Never    Smokeless tobacco: Never   Substance Use Topics    Alcohol use: Yes     Comment: occasional       PROBLEM LIST:   There are no problems to display for this patient.      MEDICATIONS:   Prescription Medications as of 10/4/2023         Rx Number Disp Refills Start End Last Dispensed Date Next Fill Date Owning Pharmacy    ACIDOPHILUS LACTOBACILLUS PO            Sig: Take 1 capsule by mouth    Class: Historical    Route: Oral    albuterol (PROAIR HFA/PROVENTIL HFA/VENTOLIN HFA) 108 (90 Base) MCG/ACT inhaler    5/10/2019        Class: Historical    amitriptyline (ELAVIL) 10 MG tablet            Sig: Take 10 mg by mouth    Class: Historical    Route: Oral    budesonide (UCERIS) 9 MG 24 hr tablet  37 tablet 0 6/18/2020    [facility]/pharmacy #[address] [place] [address]    Sig: Take 1 tablet (9 mg) by mouth every morning For 30 days, then every other day for two weeks then stop    Class: E-Prescribe    Route: Oral    butalbital-acetaminophen-caffeine (FIORICET/ESGIC) -40 MG tablet    9/24/2018        Sig: Take 1 tablet by mouth    Class: Historical    Route: Oral    ciprofloxacin (CIPRO) 500 MG tablet  28 tablet 0 10/27/2022    [facility]/pharmacy #[address] [place] [address]    Sig: Take 1 tablet (500 mg) by mouth 2 times daily    Class: E-Prescribe    Route: Oral    hydrocortisone (CORTENEMA) 100 MG/60ML enema  30 enema 0 2/22/2022    [facility]/pharmacy #[address] [place] [address]    Sig: Place 1 enema rectally At Bedtime    Class: E-Prescribe    Route: Rectal    mesalamine (LIALDA) 1.2 g DR tablet  360 tablet 3 3/24/2022    [facility]/pharmacy #[address] [place] [address]    Sig: Take 4 tablets (4,800 mg) by mouth daily    Class: E-Prescribe    Route: Oral    mesalamine (ROWASA) 4 g enema  5400 mL 1 3/29/2023     [facility]/pharmacy #[id number] - [place] [address]    Sig: Place 1 enema (4 g) rectally At Bedtime    Class: E-Prescribe    Route: Rectal    metroNIDAZOLE (FLAGYL) 250 MG tablet  42 tablet 0 10/27/2022    [facility]/pharmacy #[id number] - [place] [address]    Sig: Take 1 tablet (250 mg) by mouth 3 times daily    Class: E-Prescribe    Route: Oral    Multiple Vitamins-Iron (MULTIVITAMIN/IRON PO)            Sig: Take 1 tablet by mouth    Class: Historical    Route: Oral    omeprazole (PRILOSEC) 40 MG DR capsule  90 capsule 1 12/30/2020    [facility]/pharmacy #[id number] - [place] [address]    Sig: Take 1 capsule (40 mg) by mouth daily    Class: E-Prescribe    Route: Oral            ALLERGIES:   Sulfa antibiotics    ROS:  Negative unless otherwise stated in HPI.      Physical Examination:   VITALS:   There were no vitals taken for this visit.  Patient was seen via a billable video visit  Gen: alert and oriented, no acute distress  HEENT: EOMI, normocephalic  Neck: supple  Labs:    BMP RESULTS:  Lab Results   Component Value Date     06/25/2021    POTASSIUM 4.2 06/25/2021    POTASSIUM 3.5 07/22/2019    CHLORIDE 105 06/25/2021    CO2 24 06/25/2021    ANIONGAP 10 06/25/2021    GLC 98 06/25/2021    GLC 96 07/22/2019    BUN 11 06/25/2021    CR 0.81 09/18/2023    CR 0.72 07/22/2019    GFRESTIMATED 89 09/18/2023    GFRESTIMATED >60 06/25/2021    GFRESTIMATED 101.5 07/22/2019    GFRESTBLACK >60 06/25/2021    JILLIAN 9.1 06/25/2021        CBC RESULTS:  Lab Results   Component Value Date    WBC 11.5 (H) 09/18/2023    RBC 5.04 09/18/2023    HGB 15.6 09/18/2023    HCT 45.2 09/18/2023    MCV 90 09/18/2023    MCH 31.0 09/18/2023    MCHC 34.5 09/18/2023    RDW 14.3 09/18/2023     09/18/2023       INR/PTT:  Lab Results   Component Value Date    INR 1.12 (H) 03/18/2019       Diagnostic studies:   CTA of the liver 9/21/2023 demonstrates a 1 cm aneurysm of the splenic artery at an infundibular branch near  the hilum of the spleen         This appeared to be present on CT 3/5/2019 and MRI 5/10/2018 and appears to be similar in size at those timepoints    3/5/2019      MRI 5/10/2018              Assessment   Ms Salter is a 48 year old female with a history of UC found to have a incidentally splenic arterial aneurysm measuring 10-11mm which appears to be stable going to back at least 2018. Given she is not planning on future childbearing the recommended approach is surveillance imaging.   Plan   Follow up in 1 year with MRA of the abdomen with gadavist   - Will not plan to treat unless > 20% threshold growth     I interviewed the patient with the resident and agree with the assessment and plan. Risks and benefits of treatment vs observation discussed. Will proceed with annual observation given stability of aneurysm over last 5 years.    ----- Service Performed and Documented by Resident or Fellow ------           Again, thank you for allowing me to participate in the care of your patient.      Sincerely,    Carter Delgado MD

## 2023-10-06 NOTE — PATIENT INSTRUCTIONS
You were seen today in the Vascular IR Clinic by Dr Delgado for consult regarding splenic artery aneurysm.    Plan:    - Complete MRA Abdomen with Contrast in one year prior to follow up.  - Follow up in person or virtually in clinic once testing complete to discuss results.    Please call or send a MyChart with any questions or concerns.    Nessa TAVERAS LPN / Cinda SHARMA, RNCC  [phone]

## 2023-10-09 ENCOUNTER — APPOINTMENT (OUTPATIENT)
Dept: CT IMAGING | Facility: CLINIC | Age: 48
End: 2023-10-09
Attending: EMERGENCY MEDICINE
Payer: COMMERCIAL

## 2023-10-09 ENCOUNTER — HOSPITAL ENCOUNTER (EMERGENCY)
Facility: CLINIC | Age: 48
Discharge: HOME | End: 2023-10-09
Attending: EMERGENCY MEDICINE | Admitting: EMERGENCY MEDICINE
Payer: COMMERCIAL

## 2023-10-09 VITALS
HEART RATE: 96 BPM | SYSTOLIC BLOOD PRESSURE: 104 MMHG | RESPIRATION RATE: 21 BRPM | DIASTOLIC BLOOD PRESSURE: 64 MMHG | WEIGHT: 130 LBS | TEMPERATURE: 97.7 F | OXYGEN SATURATION: 90 % | HEIGHT: 67 IN | BODY MASS INDEX: 20.4 KG/M2

## 2023-10-09 DIAGNOSIS — J45.901 EXACERBATION OF ASTHMA, UNSPECIFIED ASTHMA SEVERITY, UNSPECIFIED WHETHER PERSISTENT: ICD-10-CM

## 2023-10-09 LAB
ANION GAP SERPL CALCULATED.3IONS-SCNC: 14 MMOL/L (ref 7–15)
BASE EXCESS BLDV CALC-SCNC: 1.9 MMOL/L
BASOPHILS # BLD MANUAL: 0.1 10E3/UL (ref 0–0.2)
BASOPHILS NFR BLD MANUAL: 1 %
BUN SERPL-MCNC: 8 MG/DL (ref 6–20)
CALCIUM SERPL-MCNC: 9.4 MG/DL (ref 8.6–10)
CHLORIDE SERPL-SCNC: 100 MMOL/L (ref 98–107)
CREAT SERPL-MCNC: 0.6 MG/DL (ref 0.51–1.17)
DEPRECATED HCO3 PLAS-SCNC: 25 MMOL/L (ref 22–29)
EGFRCR SERPLBLD CKD-EPI 2021: >90 ML/MIN/1.73M2
EOSINOPHIL # BLD MANUAL: 2 10E3/UL (ref 0–0.7)
EOSINOPHIL NFR BLD MANUAL: 20 %
ERYTHROCYTE [DISTWIDTH] IN BLOOD BY AUTOMATED COUNT: 13.8 % (ref 10–15)
FLUAV RNA SPEC QL NAA+PROBE: NEGATIVE
FLUBV RNA RESP QL NAA+PROBE: NEGATIVE
GLUCOSE SERPL-MCNC: 99 MG/DL (ref 70–99)
HCO3 BLDV-SCNC: 27 MMOL/L (ref 24–30)
HCT VFR BLD AUTO: 43.9 % (ref 35–53)
HGB BLD-MCNC: 14.7 G/DL (ref 11.7–17.7)
LYMPHOCYTES # BLD MANUAL: 2.8 10E3/UL (ref 0.8–5.3)
LYMPHOCYTES NFR BLD MANUAL: 28 %
MCH RBC QN AUTO: 30.4 PG (ref 26.5–33)
MCHC RBC AUTO-ENTMCNC: 33.5 G/DL (ref 31.5–36.5)
MCV RBC AUTO: 91 FL (ref 78–100)
MONOCYTES # BLD MANUAL: 0.3 10E3/UL (ref 0–1.3)
MONOCYTES NFR BLD MANUAL: 3 %
NEUTROPHILS # BLD MANUAL: 4.8 10E3/UL (ref 1.6–8.3)
NEUTROPHILS NFR BLD MANUAL: 48 %
NT-PROBNP SERPL-MCNC: <36 PG/ML (ref 0–450)
OXYHGB MFR BLDV: 35.2 % (ref 70–75)
PCO2 BLDV: 45 MM HG (ref 35–50)
PH BLDV: 7.39 [PH] (ref 7.35–7.45)
PLAT MORPH BLD: ABNORMAL
PLATELET # BLD AUTO: 286 10E3/UL (ref 150–450)
PO2 BLDV: 24 MM HG (ref 25–47)
POTASSIUM SERPL-SCNC: 3.7 MMOL/L (ref 3.4–5.3)
RBC # BLD AUTO: 4.84 10E6/UL (ref 3.8–5.9)
RBC MORPH BLD: ABNORMAL
RSV RNA SPEC NAA+PROBE: NEGATIVE
SAO2 % BLDV: 35.7 % (ref 70–75)
SARS-COV-2 RNA RESP QL NAA+PROBE: NEGATIVE
SODIUM SERPL-SCNC: 139 MMOL/L (ref 135–145)
WBC # BLD AUTO: 10.1 10E3/UL (ref 4–11)

## 2023-10-09 PROCEDURE — 82805 BLOOD GASES W/O2 SATURATION: CPT | Performed by: EMERGENCY MEDICINE

## 2023-10-09 PROCEDURE — 87040 BLOOD CULTURE FOR BACTERIA: CPT | Performed by: EMERGENCY MEDICINE

## 2023-10-09 PROCEDURE — 250N000009 HC RX 250: Performed by: EMERGENCY MEDICINE

## 2023-10-09 PROCEDURE — 94640 AIRWAY INHALATION TREATMENT: CPT

## 2023-10-09 PROCEDURE — 83880 ASSAY OF NATRIURETIC PEPTIDE: CPT | Performed by: FAMILY MEDICINE

## 2023-10-09 PROCEDURE — 82310 ASSAY OF CALCIUM: CPT | Performed by: FAMILY MEDICINE

## 2023-10-09 PROCEDURE — 36415 COLL VENOUS BLD VENIPUNCTURE: CPT | Performed by: EMERGENCY MEDICINE

## 2023-10-09 PROCEDURE — 87637 SARSCOV2&INF A&B&RSV AMP PRB: CPT | Performed by: EMERGENCY MEDICINE

## 2023-10-09 PROCEDURE — 250N000011 HC RX IP 250 OP 636: Performed by: STUDENT IN AN ORGANIZED HEALTH CARE EDUCATION/TRAINING PROGRAM

## 2023-10-09 PROCEDURE — 85027 COMPLETE CBC AUTOMATED: CPT | Performed by: FAMILY MEDICINE

## 2023-10-09 PROCEDURE — 93005 ELECTROCARDIOGRAM TRACING: CPT | Performed by: FAMILY MEDICINE

## 2023-10-09 PROCEDURE — 71275 CT ANGIOGRAPHY CHEST: CPT

## 2023-10-09 PROCEDURE — 99291 CRITICAL CARE FIRST HOUR: CPT | Mod: 25

## 2023-10-09 PROCEDURE — 85007 BL SMEAR W/DIFF WBC COUNT: CPT | Performed by: FAMILY MEDICINE

## 2023-10-09 PROCEDURE — 96374 THER/PROPH/DIAG INJ IV PUSH: CPT | Mod: 59

## 2023-10-09 PROCEDURE — 250N000011 HC RX IP 250 OP 636: Mod: JZ | Performed by: EMERGENCY MEDICINE

## 2023-10-09 PROCEDURE — 36415 COLL VENOUS BLD VENIPUNCTURE: CPT | Performed by: FAMILY MEDICINE

## 2023-10-09 RX ORDER — PREDNISONE 20 MG/1
60 TABLET ORAL DAILY
Qty: 12 TABLET | Refills: 0 | Status: SHIPPED | OUTPATIENT
Start: 2023-10-10 | End: 2023-10-14

## 2023-10-09 RX ORDER — AZITHROMYCIN 250 MG/1
TABLET, FILM COATED ORAL
Qty: 6 TABLET | Refills: 0 | Status: SHIPPED | OUTPATIENT
Start: 2023-10-09 | End: 2023-10-14

## 2023-10-09 RX ORDER — IOPAMIDOL 755 MG/ML
70 INJECTION, SOLUTION INTRAVASCULAR ONCE
Status: COMPLETED | OUTPATIENT
Start: 2023-10-09 | End: 2023-10-09

## 2023-10-09 RX ORDER — IPRATROPIUM BROMIDE AND ALBUTEROL SULFATE 2.5; .5 MG/3ML; MG/3ML
3 SOLUTION RESPIRATORY (INHALATION) ONCE
Status: COMPLETED | OUTPATIENT
Start: 2023-10-09 | End: 2023-10-09

## 2023-10-09 RX ORDER — ALBUTEROL SULFATE 90 UG/1
2 AEROSOL, METERED RESPIRATORY (INHALATION) EVERY 4 HOURS PRN
Qty: 18 G | Refills: 0 | Status: SHIPPED | OUTPATIENT
Start: 2023-10-09

## 2023-10-09 RX ORDER — METHYLPREDNISOLONE SODIUM SUCCINATE 125 MG/2ML
125 INJECTION, POWDER, LYOPHILIZED, FOR SOLUTION INTRAMUSCULAR; INTRAVENOUS ONCE
Status: COMPLETED | OUTPATIENT
Start: 2023-10-09 | End: 2023-10-09

## 2023-10-09 RX ORDER — ALBUTEROL SULFATE 5 MG/ML
2.5 SOLUTION RESPIRATORY (INHALATION) EVERY 6 HOURS PRN
Status: DISCONTINUED | OUTPATIENT
Start: 2023-10-09 | End: 2023-10-09 | Stop reason: HOSPADM

## 2023-10-09 RX ADMIN — IPRATROPIUM BROMIDE AND ALBUTEROL SULFATE 3 ML: .5; 3 SOLUTION RESPIRATORY (INHALATION) at 13:18

## 2023-10-09 RX ADMIN — IPRATROPIUM BROMIDE AND ALBUTEROL SULFATE 3 ML: .5; 3 SOLUTION RESPIRATORY (INHALATION) at 13:19

## 2023-10-09 RX ADMIN — METHYLPREDNISOLONE SODIUM SUCCINATE 125 MG: 125 INJECTION, POWDER, FOR SOLUTION INTRAMUSCULAR; INTRAVENOUS at 13:55

## 2023-10-09 RX ADMIN — IOPAMIDOL 70 ML: 755 INJECTION, SOLUTION INTRAVENOUS at 15:13

## 2023-10-09 ASSESSMENT — ACTIVITIES OF DAILY LIVING (ADL)
ADLS_ACUITY_SCORE: 35
ADLS_ACUITY_SCORE: 35
ADLS_ACUITY_SCORE: 33

## 2023-10-09 NOTE — ED TRIAGE NOTES
Pt with cough for past 2 weeks, SOB and chest tightness for past week. SOB/chest tightness with activity. Hx of asthma and had been using inhaler but stopped because it was making her heart race. Pt alert.     Triage Assessment       Row Name 10/09/23 1225       Triage Assessment (Adult)    Airway WDL WDL       Respiratory WDL    Respiratory WDL X       Skin Circulation/Temperature WDL    Skin Circulation/Temperature WDL WDL       Cardiac WDL    Cardiac WDL X;chest pain       Chest Pain Assessment    Chest Pain Location substernal    Character tightness    Duration 1 week    Precipitating Factors activity;physical exertion    Alleviating Factors sitting up;rest    Associated Signs/Symptoms dyspnea    Chest Pain Intervention 12-lead ECG obtained       Cognitive/Neuro/Behavioral WDL    Cognitive/Neuro/Behavioral WDL WDL

## 2023-10-09 NOTE — ED NOTES
EMERGENCY DEPARTMENT SIGN OUT NOTE        ED COURSE AND MEDICAL DECISION MAKING  Patient was signed out to me by Dr Tana Mcdonald at 2:40 PM.     3:58 PM Rechecked and updated patient.   5:28 PM Updated patient. Patient notes she feels better.     In brief, Velma Mcfarlane is a 48 year old adult who initially presented with shortness of breath and wheezing.    - CTA normal.  Patient was ambulated throughout the department with no significant hypoxia or worsening of symptoms.  Labs reviewed.  Shared decision making with patient at bedside.  Offered admission however patient states she stated that she would most likely sleep better at home.  Think this is reasonable.  Vitals otherwise are stable.  Patient reports that she feels significantly better.  Sent home with prednisone as well as albuterol and outpatient follow-up.          At time of sign out, disposition was pending CTA chest and re-assessment.     FINAL IMPRESSION    1. Exacerbation of asthma, unspecified asthma severity, unspecified whether persistent        ED MEDS  Medications   albuterol (PROVENTIL) neb solution 2.5 mg (has no administration in time range)   ipratropium - albuterol 0.5 mg/2.5 mg/3 mL (DUONEB) neb solution 3 mL (3 mLs Nebulization $Given 10/9/23 1319)   ipratropium - albuterol 0.5 mg/2.5 mg/3 mL (DUONEB) neb solution 3 mL (3 mLs Nebulization $Given 10/9/23 1318)   methylPREDNISolone sodium succinate (solu-MEDROL) injection 125 mg (125 mg Intravenous $Given 10/9/23 1355)   iopamidol (ISOVUE-370) solution 70 mL (70 mLs Intravenous $Given 10/9/23 1513)       LAB  Labs Ordered and Resulted from Time of ED Arrival to Time of ED Departure   BLOOD GAS VENOUS - Abnormal       Result Value    pH Venous 7.39      pCO2 Venous 45      pO2 Venous 24 (*)     Bicarbonate Venous 27      Base Excess/Deficit 1.9      Oxyhemoglobin Venous 35.2 (*)     O2 Sat, Venous 35.7 (*)    DIFFERENTIAL - Abnormal    % Neutrophils 48      % Lymphocytes 28       % Monocytes 3      % Eosinophils 20      % Basophils 1      Absolute Neutrophils 4.8      Absolute Lymphocytes 2.8      Absolute Monocytes 0.3      Absolute Eosinophils 2.0 (*)     Absolute Basophils 0.1      RBC Morphology Confirmed RBC Indices      Platelet Assessment        Value: Automated Count Confirmed. Platelet morphology is normal.   INFLUENZA A/B, RSV, & SARS-COV2 PCR - Normal    Influenza A PCR Negative      Influenza B PCR Negative      RSV PCR Negative      SARS CoV2 PCR Negative     BASIC METABOLIC PANEL - Normal    Sodium 139      Potassium 3.7      Chloride 100      Carbon Dioxide (CO2) 25      Anion Gap 14      Urea Nitrogen 8.0      Creatinine 0.60      GFR Estimate >90      Calcium 9.4      Glucose 99     N TERMINAL PRO BNP OUTPATIENT - Normal    N Terminal Pro BNP Outpatient <36     CBC WITH PLATELETS AND DIFFERENTIAL - Normal    WBC Count 10.1      RBC Count 4.84      Hemoglobin 14.7      Hematocrit 43.9      MCV 91      MCH 30.4      MCHC 33.5      RDW 13.8      Platelet Count 286     BLOOD CULTURE   BLOOD CULTURE       EKG      RADIOLOGY    CT Chest Pulmonary Embolism w Contrast   Final Result   IMPRESSION:   1.  No acute pulmonary emboli.      2.  Multifocal groundglass opacities bilaterally, likely infectious or inflammatory. Scattered endobronchial opacities could be secondary to aspiration or small airway inflammation.          DISCHARGE MEDS  New Prescriptions    No medications on file         I, Ana Enrique am serving as a scribe to document services personally performed by Gerard Lazaro DO, based on my observations and the provider's statements to me.  I, Gerard Lazaro DO, attest that Ana Enrique is acting in a scribe capacity, has observed my performance of the services and has documented them in accordance with my direction.    Gerard Lazaro DO  [facility] EMERGENCY ROOM  [address] [facility] [address] [phone]       Gerard Lazaro  DO  10/09/23 173

## 2023-10-09 NOTE — DISCHARGE INSTRUCTIONS
Please continue to take your Advair twice daily.    Use the albuterol as needed up to every 2 hours.    Return for any worsening symptoms.  Take prednisone as prescribed.  Please also take the antibiotic as prescribed.  This should also help with irritation/inflammation of your airways.

## 2023-10-09 NOTE — ED PROVIDER NOTES
EMERGENCY DEPARTMENT ENCOUNTER      NAME: Velma Mcfarlane  AGE: 48 year old adult  YOB: 1975  MRN: [id number]  EVALUATION DATE & TIME: 10/9/2023 12:49 PM    PCP: No Ref-Primary, Physician    ED PROVIDER: Tana Mcdonald MD      Chief Complaint   Patient presents with    Shortness of Breath         FINAL IMPRESSION:  1. Exacerbation of asthma, unspecified asthma severity, unspecified whether persistent          ED COURSE & MEDICAL DECISION MAKING:    Pertinent Labs & Imaging studies reviewed. (See chart for details)  48 year old adult presents to the Emergency Department for evaluation of shortness of breath.  Patient with 2 excessive cough productive of colored sputum, no hemoptysis.  She has had ongoing chest tightness, becomes winded with minimal activity.  She used her albuterol inhaler 4 times on Saturday with minimal relief.  She has continued to feel short of breath.  She is not normally on oxygen.  Here the patient is hypoxic on room air.  On 2 L her sats are 94%.  Speaking in full sentences makes her winded.  Attempted to duo nebs, IV Solu-Medrol, will plan for CT of the chest to rule out PE.  Patient with expiratory wheezing on exam.  After nebs will reassess for improvement.  Patient signed out to oncoming provider with pending imaging and reassessment.  COVID and influenza negative.    12:58 PM I met with the patient for an initial encounter and evaluation.  2:40 PM Patient signed out to Dr. Gerard Lazaro with pending CTA chest and re-assessment    At the conclusion of the encounter I discussed the results of all of the tests and the disposition. The questions were answered. The patient or family acknowledged understanding and was agreeable with the care plan.       60 minutes of critical care time     Medical Decision Making    History:  Supplemental history from: Documented in chart, if applicable  External Record(s) reviewed: Documented in chart, if applicable.    Work Up:  Chart  "documentation includes differential considered and any EKGs or imaging independently interpreted by provider, where specified.  In additional to work up documented, I considered the following work up: Documented in chart, if applicable.    External consultation:  Discussion of management with another provider: Documented in chart, if applicable    Complicating factors:  Care impacted by chronic illness: Splenic Artery Aneurysm  Care affected by social determinants of health: Access to Medical Care    Disposition considerations: Admission considered. Patient was signed out to the oncoming physician, disposition pending.      MEDICATIONS GIVEN IN THE EMERGENCY:  Medications   ipratropium - albuterol 0.5 mg/2.5 mg/3 mL (DUONEB) neb solution 3 mL (3 mLs Nebulization $Given 10/9/23 1319)   ipratropium - albuterol 0.5 mg/2.5 mg/3 mL (DUONEB) neb solution 3 mL (3 mLs Nebulization $Given 10/9/23 1318)   methylPREDNISolone sodium succinate (solu-MEDROL) injection 125 mg (125 mg Intravenous $Given 10/9/23 1355)       NEW PRESCRIPTIONS STARTED AT TODAY'S ER VISIT  New Prescriptions    No medications on file          =================================================================    HPI    Patient information was obtained from: Patient    Use of : N/A       Velma Mcfarlane is a 48 year old adult with a pertinent history of splenic artery aneurysm, insomnia, non-toxic multinodular goiter, who presents to this ED by private car for evaluation of shortness of breath.    Patient reports an onset of a cough and shortness of breath  which occurred 2-3 weeks ago, prior to arrival in the ED. She endorses \"greenish\"phlegm, chest tightness, and chills. She has been using her albuterol inhaler with steroids. She last took her inhaler four times on 10/7 (~2 days prior) and hasn't used it since. Patient has been drinking fluids well. However, she has been more winded with minimal activity. She hasn't been around " anyone ill. Patient denies fever.     Patient has a history of asthma but isn't on oxygen. She experienced an ulcerative colitis flare prior to her symptoms today. She didn't have any melena or diarrhea. However, her GI provider found changes on her recent CT abdomen pelvic images, which she is unsure of. No other medical concerns.    PAST MEDICAL HISTORY:  History reviewed. No pertinent past medical history.    PAST SURGICAL HISTORY:  Past Surgical History:   Procedure Laterality Date    CHOLECYSTECTOMY      COLONOSCOPY      COLONOSCOPY N/A 8/12/2019    Procedure: COLONOSCOPY, WITH POLYPECTOMY AND BIOPSY;  Surgeon: Yolanda Molina MD;  Location: [facility] OR    HC REMOVAL GALLBLADDER      Description: Cholecystectomy;  Recorded: 04/06/2011;           CURRENT MEDICATIONS:    ACIDOPHILUS LACTOBACILLUS PO  albuterol (PROAIR HFA/PROVENTIL HFA/VENTOLIN HFA) 108 (90 Base) MCG/ACT inhaler  amitriptyline (ELAVIL) 10 MG tablet  budesonide (UCERIS) 9 MG 24 hr tablet  butalbital-acetaminophen-caffeine (FIORICET/ESGIC) -40 MG tablet  ciprofloxacin (CIPRO) 500 MG tablet  hydrocortisone (CORTENEMA) 100 MG/60ML enema  mesalamine (LIALDA) 1.2 g DR tablet  mesalamine (ROWASA) 4 g enema  metroNIDAZOLE (FLAGYL) 250 MG tablet  Multiple Vitamins-Iron (MULTIVITAMIN/IRON PO)  omeprazole (PRILOSEC) 40 MG DR capsule        ALLERGIES:  Allergies   Allergen Reactions    Sulfa Antibiotics Hives     Other reaction(s): *Unknown       FAMILY HISTORY:  Family History   Problem Relation Age of Onset    Diverticulitis Father     Inflammatory Bowel Disease No family hx of     Colon Cancer No family hx of     Coronary Artery Disease Father     Cerebrovascular Disease Maternal Grandfather     Cerebrovascular Disease Paternal Grandfather        SOCIAL HISTORY:   Social History     Socioeconomic History    Marital status:    Tobacco Use    Smoking status: Never    Smokeless tobacco: Never   Substance and Sexual Activity    Alcohol use: Yes      "Comment: occasional    Drug use: Never       VITALS:  /86   Pulse 108   Temp 97.7  F (36.5  C) (Oral)   Resp 18   Ht 1.702 m (5' 7\")   Wt 59 kg (130 lb)   SpO2 91%   BMI 20.36 kg/m      PHYSICAL EXAM    Constitutional: Well developed, Well nourished. Winded when speaking in full sentences.  HENT: Normocephalic, Atraumatic, Bilateral external ears normal, Oropharynx normal, mucous membranes moist, Nose normal.   Neck- Normal range of motion, No tenderness, Supple, No stridor.  Eyes: PERRL, EOMI, Conjunctiva normal, No discharge.   Respiratory: Normal breath sounds. Diffuse expiratory wheezing.  Cardiovascular: Normal heart rate, Regular rhythm  GI: Bowel sounds normal, Soft, No tenderness,   Musculoskeletal: No edema. Good range of motion in all major joints. No tenderness to palpation or major deformities noted. No calf tenderness.  Integument: Warm, Dry, No erythema, No rash  Neurologic: Alert & oriented x 3, Normal motor function, Normal sensory function, No focal deficits noted. Normal gait.   Psychiatric: Affect normal, Judgment normal, Mood normal.      LAB:  All pertinent labs reviewed and interpreted.  Results for orders placed or performed during the hospital encounter of 10/09/23   Symptomatic Influenza A/B, RSV, & SARS-CoV2 PCR (COVID-19) Nasopharyngeal    Specimen: Nasopharyngeal; Swab   Result Value Ref Range    Influenza A PCR Negative Negative    Influenza B PCR Negative Negative    RSV PCR Negative Negative    SARS CoV2 PCR Negative Negative   Basic metabolic panel   Result Value Ref Range    Sodium 139 135 - 145 mmol/L    Potassium 3.7 3.4 - 5.3 mmol/L    Chloride 100 98 - 107 mmol/L    Carbon Dioxide (CO2) 25 22 - 29 mmol/L    Anion Gap 14 7 - 15 mmol/L    Urea Nitrogen 8.0 6.0 - 20.0 mg/dL    Creatinine 0.60 0.51 - 1.17 mg/dL    GFR Estimate >90 >60 mL/min/1.73m2    Calcium 9.4 8.6 - 10.0 mg/dL    Glucose 99 70 - 99 mg/dL   N terminal pro BNP outpatient   Result Value Ref Range    N " Terminal Pro BNP Outpatient <36 0 - 450 pg/mL   Blood gas venous   Result Value Ref Range    pH Venous 7.39 7.35 - 7.45    pCO2 Venous 45 35 - 50 mm Hg    pO2 Venous 24 (L) 25 - 47 mm Hg    Bicarbonate Venous 27 24 - 30 mmol/L    Base Excess/Deficit 1.9   mmol/L    Oxyhemoglobin Venous 35.2 (L) 70.0 - 75.0 %    O2 Sat, Venous 35.7 (L) 70.0 - 75.0 %   CBC with platelets and differential   Result Value Ref Range    WBC Count 10.1 4.0 - 11.0 10e3/uL    RBC Count 4.84 3.80 - 5.90 10e6/uL    Hemoglobin 14.7 11.7 - 17.7 g/dL    Hematocrit 43.9 35.0 - 53.0 %    MCV 91 78 - 100 fL    MCH 30.4 26.5 - 33.0 pg    MCHC 33.5 31.5 - 36.5 g/dL    RDW 13.8 10.0 - 15.0 %    Platelet Count 286 150 - 450 10e3/uL    % Neutrophils      % Lymphocytes      % Monocytes      Mids % (Monos, Eos, Basos)      % Eosinophils      % Basophils      % Immature Granulocytes      Absolute Neutrophils      Absolute Lymphocytes      Absolute Monocytes      Mids Abs (Monos, Eos, Basos)      Absolute Eosinophils      Absolute Basophils      Absolute Immature Granulocytes         RADIOLOGY:  Reviewed all pertinent imaging. Please see official radiology report.  CT Chest Pulmonary Embolism w Contrast    (Results Pending)       EKG:    Performed at: 12:27    Impression: sinus rhythm with sinus arrhythmia    Rate: 97 bpm  Rhythm: sinus    CO Interval: 130 ms  QRS Interval: 80 ms  QTc Interval: 447 ms  ST Changes: no acute ischemia   Comparison: no previous for comparison    I have independently reviewed and interpreted the EKG(s) documented above.        I, Allan Che, am serving as a scribe to document services personally performed by Tana Mcdonald, based on my observation and the provider's statements to me. I, Tana Mcdonald MD, attest that Allan Che is acting in a scribe capacity, has observed my performance of the services and has documented them in accordance with my direction.    Tana Mcdonald MD  Emergency Medicine  [facility]  [facility] EMERGENCY ROOM  [address] [facility] [address] [facility] [id number]  [phone]       Tana Mcdonald MD  10/09/23 1593

## 2023-10-10 LAB
ATRIAL RATE - MUSE: 97 BPM
DIASTOLIC BLOOD PRESSURE - MUSE: 63 MMHG
INTERPRETATION ECG - MUSE: NORMAL
P AXIS - MUSE: 73 DEGREES
PR INTERVAL - MUSE: 130 MS
QRS DURATION - MUSE: 80 MS
QT - MUSE: 352 MS
QTC - MUSE: 447 MS
R AXIS - MUSE: -28 DEGREES
SYSTOLIC BLOOD PRESSURE - MUSE: 131 MMHG
T AXIS - MUSE: 57 DEGREES
VENTRICULAR RATE- MUSE: 97 BPM

## 2023-10-14 LAB
BACTERIA BLD CULT: NO GROWTH
BACTERIA BLD CULT: NO GROWTH

## 2023-11-14 ENCOUNTER — VIRTUAL VISIT (OUTPATIENT)
Dept: GASTROENTEROLOGY | Facility: CLINIC | Age: 48
End: 2023-11-14
Attending: PHYSICIAN ASSISTANT
Payer: COMMERCIAL

## 2023-11-14 VITALS — BODY MASS INDEX: 20.36 KG/M2 | WEIGHT: 130 LBS

## 2023-11-14 DIAGNOSIS — E71.312 SCAD (SHORT-CHAIN ACYL-COA DEHYDROGENASE DEFICIENCY) (H): Primary | ICD-10-CM

## 2023-11-14 PROCEDURE — 99214 OFFICE O/P EST MOD 30 MIN: CPT | Mod: VID | Performed by: PHYSICIAN ASSISTANT

## 2023-11-14 ASSESSMENT — PAIN SCALES - GENERAL: PAINLEVEL: NO PAIN (0)

## 2023-11-14 NOTE — LETTER
11/14/2023         RE: Velma Mcfarlane  [address] [facility] [id number]        Dear Colleague,    Thank you for referring your patient, Velma Mcfarlane, to the [facility] [place]. Please see a copy of my visit note below.    Velma Mcfarlane is a 48 year old adult who is being evaluated via a billable video visit.      IBD CLINIC VISIT -- follow up    CHIEF COMPLAINT: SCAD evaluation and management    SCAD HISTORY  Age at diagnosis: 44  Extent of disease: SCAD, left sided rui-diverticular inflammation  Current SCAD medications: rowasa  ( previously on Lialda 4.8 G daily, self discontinued in March 2023)  Prior SCAD Medications: Cipro/flagyl (helped)    DISEASE ASSESSMENT  Labs:  Recent Labs   Lab Test 10/09/23  1339 09/18/23  1101 06/19/19  1009 03/18/19  1652 06/20/18  1611   CRP  --   --  <2.9  --  0.6   SED  --  15 9  --  20   HGB 14.7 15.6  --    < >  --     < > = values in this interval not displayed.     Endoscopic assessment:     8/2019 icscope showed Dee 2 colitis from 30 cm-40cm. Diverticulosis in the sigmoid. Rest of colon was normal.     PATH:  SPECIMEN(S):   A: Cecal biopsy   B: Colon biopsy, ascending   C: Colon biopsy, transverse   D: Colon biopsy, descending   E: Sigmoid colon biopsy   F: Rectal biopsy     FINAL DIAGNOSIS:   A. Cecum, biopsy:   Colonic mucosa with no pathologic abnormalities     B. Ascending Colon, Biopsy:   Colonic mucosa with no pathologic abnormalities     C. Transverse Colon, biopsy:   Colonic mucosa with no pathologic abnormalities     D. Descending Colon, biopsy:   Colonic mucosa with no pathologic abnormalities     E. Sigmoid Colon, biopsy:   Colitis with crypt injury, moderately active, with rectum sparing (see   part F below); consistent with SCAD   (segmental colitisassociated diverticulosis); negative for dysplasia     F. Rectum, biopsy:   Rectal mucosa with no pathologic abnormalities      EGD 4/15/19 Normal, normal biopsies of esophagus, stomach and duodenum  3/25/19 colonoscopy for rectal bleeding: left sided diverticular with edema, erythema, loss of vascular pattern noted in 2 segments (40-36 cm and 30-22 cm from the anus.    CT ab/pelvis 3/5/19:  Renal hypodensities stable from 4/16/19 exam, 2 mm calculus in right kidney, no gastric or small bowel abnormality  Fecal calprotectin: pending  C diff: negative per OSH records    ASSESSMENT/PLAN  Mrs. Salter is a 48 year old woman with history of cholecystectomy, 3 uncomplicated vaginal deliveries, thyroid cysts (per patient), and segmental colitis associated with diverticular disease who is following up for management of her disease. Overall, she has done well. She had elevated WBC and CRP that prompted imaging with CT showing no abdominal wall thickening, mild sigmoid diverticulosis without diverticulitis. Continues on SCD diet.     We will proceed with the following plan:     Plan:  --- Colonoscopy in Dec  --- Continue rowasa enemas, pending scope finding, may restart lialda  --- CBC, LFTs, CRP, ESR    Return to clinic in 6 months    Thank you for this consultation.    It was a pleasure to participate in the care of this patient; please contact us with any further questions.      Gerhard Nicolas PA-C  Division of Gastroenterology, Hepatology and Nutrition  [facility]      HPI:   Mrs. Salter is a 48 year old woman with history of cholecystectomy, 3 uncomplicated vaginal deliveries, thyroid cysts (per patient), and recent diagnosis of segmental colitis associated with diverticular disease who is establishing care for management of her disease.    Per chart:  Patient seen about 2014 at [facility] for diverticulitis and last seen 5/2019 for follow up of SCAD and nausea.  Patient had colonoscopy 3/2019 with finding of edema, erythema, loss of and loss of vascular patter in 2 segments of left colon with mild to moderate diverticulosis.  Sigmoid  "biopsies showed mildly active chronic colitis consistent with SCAD.  Patient was treated with 10 day course of cipro/flagyl with improvement in symptoms followed by Lialda 4.8 G daily with possible improvement. On 4/11/19 the patient was found to be c.diff negative.      At time of consultation, patient reported frustation with lack of symptom improvement and communication at prior GI providers office. She presented here.  Patient reports 5 years of alternating diarrhea and constipation along with occassional undigested food in stool and borrygymi.  She reports had initial improvement in symptoms following cipro/flagyl and mesalamine but called in as still was having blood streaked stools for which she was started on rowasa enemas.  She is concerned she is still flaring despite the therapies and putting herself on a low residue \"specific carbohydrate diet\" supplemented with \"high protein predigested cancer shakes\".  She reports weight loss in the setting of biking 12 miles daily and changed diet.    At time of consultation the patient reports two semiformed stools daily with blood streaks in all stools (10% blood) and with wiping.  She notes one day a week she will have bloating, increased flatus, general malaise, 5-6 watery BM's along with no associated abdominal pain.      Patient also reports 10 years of chronic food getting stuck in her esophagus until drinks some water with the food.  It doesn't happen with draper, but rather typically with meats.  She has never had a food impaction.  Happens with every meal.  No odynophagia, no nausea currently, no vomiting, rare reflux/heartburn. PPI not helpful in the past.  Reports was having nausea but improved with simple carbohydrate diet.  Reports when was nauseated, it was typically in the morning and improved with a protein breakfast sandwich.    Prior report of dysphagia and nausea,  Had upper endoscopy April 15th 2019, 1 gastric polyp (fundic gland).  Separate " mid/distal esophageal, gastric and duodenal biopsies negative.    Patient notes Hx of thyroid cysts and borderline TSH levels.       Social hx   -no smoking  -1-2 x month nsaids  -rare alcohol   -no illicit  -Bike 12 miles a day, weights    Fam Hx  Mother with history colitis - no issues in years, colon polyps  Maternal grandfather  of colon cancer at age 76  Both parents have diverticulitis    Interval history, 2019  In August, started cipro x 2 weeks, flagyl x 2 weeks, lialda and prednisone. Notices increased pain when delays Lialda by a few hours (increased pain). Now down to prednisone 10 mg daily, going to 5 mg daily on Friday. Feels much improved. Blood has stopped; left sided pain has improved. Gained 5 lb since August. Continues on SCD; met with Rebekah Browne.    No upcoming travel plans.     Interval history, 2019  Continues on SCD, which has helped a lot.     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   Off prednisone x 3 weeks. Continues on Cortenema daily x 2 months.   Also on Lialda 4.8 g daily.     Having 2 BMs per day (non-bloody, well-formed). No blood in stool since August.   Gained a few lb since Sep.     Interval history, 2020 (virtual visit)  Got sick in Dec with wheezing and was dx with walking PNA . Treated with abx and then prednisone x 5 days.   Continues on 5 mg prednisone daily and has difficulty weaning off it due to recurrent abd pain. Has been on prednisone 5 mg x 1 month.     Having 2 BMs per day, non bloody, loose.     For SCAD, currently on Lialda 4.8g and Rowasa enemas.     Interval history, 2020 (virtual visit)  In beginning of December, had a flare in the setting of stress, with increased bowel frequency and LLQ abd pain. Started on Rowasa enemas with significant help. But around  ate a piece of cake and developed postprandial nausea and epigastric discomfort.      Continues on Lialda 4.8g daily and daily Rowasa enemas.     Currently having 2 BMs  per day, soft. No blood.     EGD in 4/2019 was normal.     Interval history, 3/24/22  She has continued with SCD (since Aug 2019) and feels that symptoms are quite stable. She continues with lialda 4.8g daily and rowasa enemas every other night. Currently having 2 stools per day, formed no blood. No abdominal pain. If she deviates from her diet, then she may have some deviation from her baseline, but overall very stable.     She has bad migraines and is currently has one today.    Interval hx 10/26/22  Symptoms over the last 6 months. 1-3 stools per day, slightly looser than normal, variable consistency from more firm to looser stools.  No blood in the stool.  No urgency or nighttime stool.  She has been consistent and compliant with lialda and rowasa. Over the last 6 months she has had discomfort in lower left quadrant. Very tired, minimal appetite. Trying to minimize grains and sugar.    A lot of stress with father in law passing and coworker passing suddenly. Submitted message through portal to get rx for steroids and antibiotics which has worked well in the past.     Interval hx 3/29/23  Cipro/flagyl allowed for improvement (rifaximin denied). Has been quite stable other than 2 weeks at end of Feb 2023. Bowel pattern fluctuates with what she perceives to be her stress level. Avg 2 BM daily that are formed. No blood in the stool. She continues with lialda 4.8g daily and rowasa daily.    Interval hx 9/12/23  Earlier last week began having abdominal discomfort in lower abdomen.  Over the weekend had moderate to severe pain across abdomen. Had chills, but did not take temp.Only doing rowasa enemas, self d/c'd lialda March 2023 bc due for blood work but ultimately did not complete. Rested and hydrated and seems to be improving. LLQ pain is still there and slightly worse than normal but improving. No fever or chills.    Interval hx 11/14/23  She continues with rowasa enemas (Holding lialda for now). Bowel pattern is  stable, no urgency. No urgency, blood in the stool or nighttime stools. No fevers or chills.   Continues on SCD.  Busy with school, computer science degree.     ROS:    No fevers or chills  No weight loss  No blurry vision, double vision or change in vision  No sore throat  No lymphadenopathy  No shortness of breath or wheezing  No chest pain or pressure  No arthralgias or myalgias  No rashes or skin changes  No dysphagia  No BRBPR, hematochezia  No dysuria, frequency or urgency  No hot/cold intolerance or polyria  No anxiety or depression    Extra intestinal manifestations of IBD:  No uveitis/episcleritis  No aphthous ulcers   No arthritis   No erythema nodosum/pyoderma gangrenosum.     PERTINENT PAST MEDICAL HISTORY:  None     PREVIOUS SURGERIES:  Past Surgical History:   Procedure Laterality Date    CHOLECYSTECTOMY      COLONOSCOPY      COLONOSCOPY N/A 8/12/2019    Procedure: COLONOSCOPY, WITH POLYPECTOMY AND BIOPSY;  Surgeon: Yolanda Molina MD;  Location: [facility] OR    HC REMOVAL GALLBLADDER      Description: Cholecystectomy;  Recorded: 04/06/2011;     Surgical hx  -Cholecystectomy 2010  -3 children - vaginal x 3, Bladder sling placed 2016    PREVIOUS ENDOSCOPY:  See above    ALLERGIES:     Allergies   Allergen Reactions    Sulfa Antibiotics Hives     Other reaction(s): *Unknown       PERTINENT MEDICATIONS:    Current Outpatient Medications:     albuterol (PROAIR HFA/PROVENTIL HFA/VENTOLIN HFA) 108 (90 Base) MCG/ACT inhaler, Inhale 2 puffs into the lungs every 4 hours as needed for shortness of breath, wheezing or cough, Disp: 18 g, Rfl: 0    amitriptyline (ELAVIL) 10 MG tablet, Take 10 mg by mouth, Disp: , Rfl:     butalbital-acetaminophen-caffeine (FIORICET/ESGIC) -40 MG tablet, Take 1 tablet by mouth, Disp: , Rfl:     mesalamine (ROWASA) 4 g enema, Place 1 enema (4 g) rectally At Bedtime, Disp: 5400 mL, Rfl: 1    Multiple Vitamins-Iron (MULTIVITAMIN/IRON PO), Take 1 tablet by mouth, Disp: , Rfl:      ACIDOPHILUS LACTOBACILLUS PO, Take 1 capsule by mouth (Patient not taking: Reported on 3/29/2023), Disp: , Rfl:     albuterol (PROAIR HFA/PROVENTIL HFA/VENTOLIN HFA) 108 (90 Base) MCG/ACT inhaler, , Disp: , Rfl:     budesonide (UCERIS) 9 MG 24 hr tablet, Take 1 tablet (9 mg) by mouth every morning For 30 days, then every other day for two weeks then stop (Patient not taking: Reported on 12/30/2020), Disp: 37 tablet, Rfl: 0    ciprofloxacin (CIPRO) 500 MG tablet, Take 1 tablet (500 mg) by mouth 2 times daily (Patient not taking: Reported on 3/29/2023), Disp: 28 tablet, Rfl: 0    hydrocortisone (CORTENEMA) 100 MG/60ML enema, Place 1 enema rectally At Bedtime (Patient not taking: Reported on 3/29/2023), Disp: 30 enema, Rfl: 0    mesalamine (LIALDA) 1.2 g DR tablet, Take 4 tablets (4,800 mg) by mouth daily, Disp: 360 tablet, Rfl: 3    metroNIDAZOLE (FLAGYL) 250 MG tablet, Take 1 tablet (250 mg) by mouth 3 times daily (Patient not taking: Reported on 3/29/2023), Disp: 42 tablet, Rfl: 0    omeprazole (PRILOSEC) 40 MG DR capsule, Take 1 capsule (40 mg) by mouth daily (Patient not taking: Reported on 10/26/2022), Disp: 90 capsule, Rfl: 1    SOCIAL HISTORY:  Social History     Socioeconomic History    Marital status:      Spouse name: Not on file    Number of children: Not on file    Years of education: Not on file    Highest education level: Not on file   Occupational History    Not on file   Tobacco Use    Smoking status: Never    Smokeless tobacco: Never   Substance and Sexual Activity    Alcohol use: Yes     Comment: occasional    Drug use: Never    Sexual activity: Not on file   Other Topics Concern    Parent/sibling w/ CABG, MI or angioplasty before 65F 55M? Not Asked   Social History Narrative    Not on file     Social Determinants of Health     Financial Resource Strain: Not on file   Food Insecurity: Not on file   Transportation Needs: Not on file   Physical Activity: Not on file   Stress: Not on file    Social Connections: Not on file   Interpersonal Safety: Not on file   Housing Stability: Not on file     FAMILY HISTORY:  Family History   Problem Relation Age of Onset    Diverticulitis Father     Inflammatory Bowel Disease No family hx of     Colon Cancer No family hx of     Coronary Artery Disease Father     Cerebrovascular Disease Maternal Grandfather     Cerebrovascular Disease Paternal Grandfather        PHYSICAL EXAMINATION:  Constitutional: aaox3, cooperative, pleasant, not dyspneic/diaphoretic, no acute distress  Vitals reviewed: Wt 59 kg (130 lb)   BMI 20.36 kg/m    Wt:   Wt Readings from Last 2 Encounters:   11/14/23 59 kg (130 lb)   10/09/23 59 kg (130 lb)      Constitutional - general appearance is well and in no acute distress. Body habitus normal  Eyes - No redness or discharge  Respiratory - No cough, unlabored breathing  Musculoskeletal - range of motion intact: Neck and arms  Skin - No discoloration or lesions  Neurological - No tremors, headaches  Psychiatric - No anxiety, alert & oriented    PERTINENT STUDIES:    Most recent hepatic panel:  Recent Labs   Lab Test 09/18/23  1101 06/25/21  1229   ALT 12 15   AST 17 15     Most recent creatinine:  Recent Labs   Lab Test 10/09/23  1339 09/18/23  1101   CR 0.60 0.81           Again, thank you for allowing me to participate in the care of your patient.      Sincerely,    Gerhard Nicolas PA-C

## 2023-11-14 NOTE — PROGRESS NOTES
Velma Mcfarlane is a 48 year old adult who is being evaluated via a billable video visit.    Virtual Visit Details    Type of service:  Video Visit     Originating Location (pt. Location): Home    Distant Location (provider location):  Off-site  Platform used for Video Visit: DossierView    IBD CLINIC VISIT -- follow up    CHIEF COMPLAINT: SCAD evaluation and management    SCAD HISTORY  Age at diagnosis: 44  Extent of disease: SCAD, left sided rui-diverticular inflammation  Current SCAD medications: rowasa  ( previously on Lialda 4.8 G daily, self discontinued in March 2023)  Prior SCAD Medications: Cipro/flagyl (helped)    DISEASE ASSESSMENT  Labs:  Recent Labs   Lab Test 10/09/23  1339 09/18/23  1101 06/19/19  1009 03/18/19  1652 06/20/18  1611   CRP  --   --  <2.9  --  0.6   SED  --  15 9  --  20   HGB 14.7 15.6  --    < >  --     < > = values in this interval not displayed.     Endoscopic assessment:     8/2019 icscope showed Dee 2 colitis from 30 cm-40cm. Diverticulosis in the sigmoid. Rest of colon was normal.     PATH:  SPECIMEN(S):   A: Cecal biopsy   B: Colon biopsy, ascending   C: Colon biopsy, transverse   D: Colon biopsy, descending   E: Sigmoid colon biopsy   F: Rectal biopsy     FINAL DIAGNOSIS:   A. Cecum, biopsy:   Colonic mucosa with no pathologic abnormalities     B. Ascending Colon, Biopsy:   Colonic mucosa with no pathologic abnormalities     C. Transverse Colon, biopsy:   Colonic mucosa with no pathologic abnormalities     D. Descending Colon, biopsy:   Colonic mucosa with no pathologic abnormalities     E. Sigmoid Colon, biopsy:   Colitis with crypt injury, moderately active, with rectum sparing (see   part F below); consistent with SCAD   (segmental colitisassociated diverticulosis); negative for dysplasia     F. Rectum, biopsy:   Rectal mucosa with no pathologic abnormalities     EGD 4/15/19 Normal, normal biopsies of esophagus, stomach and duodenum  3/25/19 colonoscopy for rectal  bleeding: left sided diverticular with edema, erythema, loss of vascular pattern noted in 2 segments (40-36 cm and 30-22 cm from the anus.    CT ab/pelvis 3/5/19:  Renal hypodensities stable from 4/16/19 exam, 2 mm calculus in right kidney, no gastric or small bowel abnormality  Fecal calprotectin: pending  C diff: negative per OSH records    ASSESSMENT/PLAN  Mrs. Salter is a 48 year old woman with history of cholecystectomy, 3 uncomplicated vaginal deliveries, thyroid cysts (per patient), and segmental colitis associated with diverticular disease who is following up for management of her disease. Overall, she has done well. She had elevated WBC and CRP that prompted imaging with CT showing no abdominal wall thickening, mild sigmoid diverticulosis without diverticulitis. Continues on SCD diet.     We will proceed with the following plan:     Plan:  --- Colonoscopy in Dec  --- Continue rowasa enemas, pending scope finding, may restart lialda  --- CBC, LFTs, CRP, ESR    Return to clinic in 6 months    Thank you for this consultation.    It was a pleasure to participate in the care of this patient; please contact us with any further questions.      Gerhard Nicolas PA-C  Division of Gastroenterology, Hepatology and Nutrition  [facility]      HPI:   Mrs. Salter is a 48 year old woman with history of cholecystectomy, 3 uncomplicated vaginal deliveries, thyroid cysts (per patient), and recent diagnosis of segmental colitis associated with diverticular disease who is establishing care for management of her disease.    Per chart:  Patient seen about 2014 at [facility] for diverticulitis and last seen 5/2019 for follow up of SCAD and nausea.  Patient had colonoscopy 3/2019 with finding of edema, erythema, loss of and loss of vascular patter in 2 segments of left colon with mild to moderate diverticulosis.  Sigmoid biopsies showed mildly active chronic colitis consistent with SCAD.  Patient was treated with 10 day course  "of cipro/flagyl with improvement in symptoms followed by Lialda 4.8 G daily with possible improvement. On 4/11/19 the patient was found to be c.diff negative.      At time of consultation, patient reported frustation with lack of symptom improvement and communication at prior GI providers office. She presented here.  Patient reports 5 years of alternating diarrhea and constipation along with occassional undigested food in stool and borrygymi.  She reports had initial improvement in symptoms following cipro/flagyl and mesalamine but called in as still was having blood streaked stools for which she was started on rowasa enemas.  She is concerned she is still flaring despite the therapies and putting herself on a low residue \"specific carbohydrate diet\" supplemented with \"high protein predigested cancer shakes\".  She reports weight loss in the setting of biking 12 miles daily and changed diet.    At time of consultation the patient reports two semiformed stools daily with blood streaks in all stools (10% blood) and with wiping.  She notes one day a week she will have bloating, increased flatus, general malaise, 5-6 watery BM's along with no associated abdominal pain.      Patient also reports 10 years of chronic food getting stuck in her esophagus until drinks some water with the food.  It doesn't happen with draper, but rather typically with meats.  She has never had a food impaction.  Happens with every meal.  No odynophagia, no nausea currently, no vomiting, rare reflux/heartburn. PPI not helpful in the past.  Reports was having nausea but improved with simple carbohydrate diet.  Reports when was nauseated, it was typically in the morning and improved with a protein breakfast sandwich.    Prior report of dysphagia and nausea,  Had upper endoscopy April 15th 2019, 1 gastric polyp (fundic gland).  Separate mid/distal esophageal, gastric and duodenal biopsies negative.    Patient notes Hx of thyroid cysts and " borderline TSH levels.       Social hx   -no smoking  -1-2 x month nsaids  -rare alcohol   -no illicit  -Bike 12 miles a day, weights    Fam Hx  Mother with history colitis - no issues in years, colon polyps  Maternal grandfather  of colon cancer at age 76  Both parents have diverticulitis    Interval history, 2019  In August, started cipro x 2 weeks, flagyl x 2 weeks, lialda and prednisone. Notices increased pain when delays Lialda by a few hours (increased pain). Now down to prednisone 10 mg daily, going to 5 mg daily on Friday. Feels much improved. Blood has stopped; left sided pain has improved. Gained 5 lb since August. Continues on SCD; met with Rebekah Browne.    No upcoming travel plans.     Interval history, 2019  Continues on SCD, which has helped a lot.     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   Off prednisone x 3 weeks. Continues on Cortenema daily x 2 months.   Also on Lialda 4.8 g daily.     Having 2 BMs per day (non-bloody, well-formed). No blood in stool since August.   Gained a few lb since Sep.     Interval history, 2020 (virtual visit)  Got sick in Dec with wheezing and was dx with walking PNA . Treated with abx and then prednisone x 5 days.   Continues on 5 mg prednisone daily and has difficulty weaning off it due to recurrent abd pain. Has been on prednisone 5 mg x 1 month.     Having 2 BMs per day, non bloody, loose.     For SCAD, currently on Lialda 4.8g and Rowasa enemas.     Interval history, 2020 (virtual visit)  In beginning of December, had a flare in the setting of stress, with increased bowel frequency and LLQ abd pain. Started on Rowasa enemas with significant help. But around  ate a piece of cake and developed postprandial nausea and epigastric discomfort.      Continues on Lialda 4.8g daily and daily Rowasa enemas.     Currently having 2 BMs per day, soft. No blood.     EGD in 2019 was normal.     Interval history, 3/24/22  She has continued  with SCD (since Aug 2019) and feels that symptoms are quite stable. She continues with lialda 4.8g daily and rowasa enemas every other night. Currently having 2 stools per day, formed no blood. No abdominal pain. If she deviates from her diet, then she may have some deviation from her baseline, but overall very stable.     She has bad migraines and is currently has one today.    Interval hx 10/26/22  Symptoms over the last 6 months. 1-3 stools per day, slightly looser than normal, variable consistency from more firm to looser stools.  No blood in the stool.  No urgency or nighttime stool.  She has been consistent and compliant with lialda and rowasa. Over the last 6 months she has had discomfort in lower left quadrant. Very tired, minimal appetite. Trying to minimize grains and sugar.    A lot of stress with father in law passing and coworker passing suddenly. Submitted message through portal to get rx for steroids and antibiotics which has worked well in the past.     Interval hx 3/29/23  Cipro/flagyl allowed for improvement (rifaximin denied). Has been quite stable other than 2 weeks at end of Feb 2023. Bowel pattern fluctuates with what she perceives to be her stress level. Avg 2 BM daily that are formed. No blood in the stool. She continues with lialda 4.8g daily and rowasa daily.    Interval hx 9/12/23  Earlier last week began having abdominal discomfort in lower abdomen.  Over the weekend had moderate to severe pain across abdomen. Had chills, but did not take temp.Only doing rowasa enemas, self d/c'd lialda March 2023 bc due for blood work but ultimately did not complete. Rested and hydrated and seems to be improving. LLQ pain is still there and slightly worse than normal but improving. No fever or chills.    Interval hx 11/14/23  She continues with rowasa enemas (Holding lialda for now). Bowel pattern is stable, no urgency. No urgency, blood in the stool or nighttime stools. No fevers or chills.   Continues  on SCD.  Busy with school, computer science degree.     ROS:    No fevers or chills  No weight loss  No blurry vision, double vision or change in vision  No sore throat  No lymphadenopathy  No shortness of breath or wheezing  No chest pain or pressure  No arthralgias or myalgias  No rashes or skin changes  No dysphagia  No BRBPR, hematochezia  No dysuria, frequency or urgency  No hot/cold intolerance or polyria  No anxiety or depression    Extra intestinal manifestations of IBD:  No uveitis/episcleritis  No aphthous ulcers   No arthritis   No erythema nodosum/pyoderma gangrenosum.     PERTINENT PAST MEDICAL HISTORY:  None     PREVIOUS SURGERIES:  Past Surgical History:   Procedure Laterality Date    CHOLECYSTECTOMY      COLONOSCOPY      COLONOSCOPY N/A 8/12/2019    Procedure: COLONOSCOPY, WITH POLYPECTOMY AND BIOPSY;  Surgeon: Yolanda Molina MD;  Location: [facility] OR    HC REMOVAL GALLBLADDER      Description: Cholecystectomy;  Recorded: 04/06/2011;     Surgical hx  -Cholecystectomy 2010  -3 children - vaginal x 3, Bladder sling placed 2016    PREVIOUS ENDOSCOPY:  See above    ALLERGIES:     Allergies   Allergen Reactions    Sulfa Antibiotics Hives     Other reaction(s): *Unknown       PERTINENT MEDICATIONS:    Current Outpatient Medications:     albuterol (PROAIR HFA/PROVENTIL HFA/VENTOLIN HFA) 108 (90 Base) MCG/ACT inhaler, Inhale 2 puffs into the lungs every 4 hours as needed for shortness of breath, wheezing or cough, Disp: 18 g, Rfl: 0    amitriptyline (ELAVIL) 10 MG tablet, Take 10 mg by mouth, Disp: , Rfl:     butalbital-acetaminophen-caffeine (FIORICET/ESGIC) -40 MG tablet, Take 1 tablet by mouth, Disp: , Rfl:     mesalamine (ROWASA) 4 g enema, Place 1 enema (4 g) rectally At Bedtime, Disp: 5400 mL, Rfl: 1    Multiple Vitamins-Iron (MULTIVITAMIN/IRON PO), Take 1 tablet by mouth, Disp: , Rfl:     ACIDOPHILUS LACTOBACILLUS PO, Take 1 capsule by mouth (Patient not taking: Reported on 3/29/2023), Disp: , Rfl:      albuterol (PROAIR HFA/PROVENTIL HFA/VENTOLIN HFA) 108 (90 Base) MCG/ACT inhaler, , Disp: , Rfl:     budesonide (UCERIS) 9 MG 24 hr tablet, Take 1 tablet (9 mg) by mouth every morning For 30 days, then every other day for two weeks then stop (Patient not taking: Reported on 12/30/2020), Disp: 37 tablet, Rfl: 0    ciprofloxacin (CIPRO) 500 MG tablet, Take 1 tablet (500 mg) by mouth 2 times daily (Patient not taking: Reported on 3/29/2023), Disp: 28 tablet, Rfl: 0    hydrocortisone (CORTENEMA) 100 MG/60ML enema, Place 1 enema rectally At Bedtime (Patient not taking: Reported on 3/29/2023), Disp: 30 enema, Rfl: 0    mesalamine (LIALDA) 1.2 g DR tablet, Take 4 tablets (4,800 mg) by mouth daily, Disp: 360 tablet, Rfl: 3    metroNIDAZOLE (FLAGYL) 250 MG tablet, Take 1 tablet (250 mg) by mouth 3 times daily (Patient not taking: Reported on 3/29/2023), Disp: 42 tablet, Rfl: 0    omeprazole (PRILOSEC) 40 MG DR capsule, Take 1 capsule (40 mg) by mouth daily (Patient not taking: Reported on 10/26/2022), Disp: 90 capsule, Rfl: 1    SOCIAL HISTORY:  Social History     Socioeconomic History    Marital status:      Spouse name: Not on file    Number of children: Not on file    Years of education: Not on file    Highest education level: Not on file   Occupational History    Not on file   Tobacco Use    Smoking status: Never    Smokeless tobacco: Never   Substance and Sexual Activity    Alcohol use: Yes     Comment: occasional    Drug use: Never    Sexual activity: Not on file   Other Topics Concern    Parent/sibling w/ CABG, MI or angioplasty before 65F 55M? Not Asked   Social History Narrative    Not on file     Social Determinants of Health     Financial Resource Strain: Not on file   Food Insecurity: Not on file   Transportation Needs: Not on file   Physical Activity: Not on file   Stress: Not on file   Social Connections: Not on file   Interpersonal Safety: Not on file   Housing Stability: Not on file     FAMILY  HISTORY:  Family History   Problem Relation Age of Onset    Diverticulitis Father     Inflammatory Bowel Disease No family hx of     Colon Cancer No family hx of     Coronary Artery Disease Father     Cerebrovascular Disease Maternal Grandfather     Cerebrovascular Disease Paternal Grandfather        PHYSICAL EXAMINATION:  Constitutional: aaox3, cooperative, pleasant, not dyspneic/diaphoretic, no acute distress  Vitals reviewed: Wt 59 kg (130 lb)   BMI 20.36 kg/m    Wt:   Wt Readings from Last 2 Encounters:   11/14/23 59 kg (130 lb)   10/09/23 59 kg (130 lb)      Constitutional - general appearance is well and in no acute distress. Body habitus normal  Eyes - No redness or discharge  Respiratory - No cough, unlabored breathing  Musculoskeletal - range of motion intact: Neck and arms  Skin - No discoloration or lesions  Neurological - No tremors, headaches  Psychiatric - No anxiety, alert & oriented    PERTINENT STUDIES:    Most recent hepatic panel:  Recent Labs   Lab Test 09/18/23  1101 06/25/21  1229   ALT 12 15   AST 17 15     Most recent creatinine:  Recent Labs   Lab Test 10/09/23  1339 09/18/23  1101   CR 0.60 0.81

## 2023-11-14 NOTE — PATIENT INSTRUCTIONS
Plan:  -- Colonoscopy in Dec  -- Continue rowasa enemas   -- Continue SCD diet  -- Contact clinic with symptoms    Return to clinic in 6 months      MARISSA ElizondoC  Division of Gastroenterology, Hepatology and Nutrition  [facility]

## 2023-11-14 NOTE — NURSING NOTE
Is the patient currently in the state of [place]? YES    Visit mode:VIDEO    If the visit is dropped, the patient can be reconnected by: VIDEO VISIT: Text to cell phone:   Telephone Information:   Mobile [phone]       Will anyone else be joining the visit? NO  (If patient encounters technical issues they should call [phone]    How would you like to obtain your AVS? MyChart    Are changes needed to the allergy or medication list? No    Reason for visit: Video Visit (Recheck IBD)    Teresa CONNOR

## 2023-11-15 ENCOUNTER — TELEPHONE (OUTPATIENT)
Dept: GASTROENTEROLOGY | Facility: CLINIC | Age: 48
End: 2023-11-15
Payer: COMMERCIAL

## 2023-11-15 NOTE — TELEPHONE ENCOUNTER
1st attempt- LVM and sent mychart    Schedule:  6 mo follow-up appointment with Gerhard Nicolas (around May 2024). RTN IBD, in person or virtual

## 2023-11-28 ENCOUNTER — MYC MEDICAL ADVICE (OUTPATIENT)
Dept: GASTROENTEROLOGY | Facility: CLINIC | Age: 48
End: 2023-11-28
Payer: COMMERCIAL

## 2023-11-28 DIAGNOSIS — K50.119 SEGMENTAL COLITIS WITH COMPLICATION (H): Primary | ICD-10-CM

## 2023-11-28 NOTE — TELEPHONE ENCOUNTER
"[facility]- (supporting Gerhard Nicolas PA-C)2 hours ago (1:54 PM)     Good afternoon,     Since my visit a few weeks ago, my stomach has kind of gone crazy.  I was under enormous stress due to school and the holidays the last 2 weeks and have noticed in the last few days a drastic uptick in stomach pain, especially when needing to eliminate my bowels. Now this AM I m seeing a small amount of blood in my stool.   I am going out of town next weekend Dec 8th and I d love to try to get this under control somehow. I ate some things I should not have around the TG holiday. I m back in my normal meal routine eating very little today. I haven t been great about the enemas but I m also 100% back on those as well.      Any advice or direction would be greatly appreciated.      Thanks   Velma  ----------------------------------------  SARAH Nicolas,  Spoke with Velma.  Symptom started about 2 weeks ago.  Stomach pain in the lower left quadrant, it is very achy.  2-5 bm/day more liquid than normal, pt attribute this to her eating habits due to the holidays  There was bright red blood this am was first time seen.  Pt maintaining a liquid diet all day.  A lot of pain that tells pt she has to \"go.\"  Denies N/V.  Denies fever. \"Was sid chill this morning\" but pt attributes that to the weather.  No other gi symptoms.    Order flare labs and stool study. Due to timing advice pt to have labs done at [facility].    Pt ok to get labs at [place]. Gave contact # to call and schedule an appt for tomorrow.    Thank you.  Rashid"

## 2023-11-29 ENCOUNTER — LAB (OUTPATIENT)
Dept: LAB | Facility: CLINIC | Age: 48
End: 2023-11-29
Payer: COMMERCIAL

## 2023-11-29 DIAGNOSIS — K50.119 SEGMENTAL COLITIS WITH COMPLICATION (H): ICD-10-CM

## 2023-11-29 LAB
ALBUMIN SERPL BCG-MCNC: 4.8 G/DL (ref 3.5–5.2)
ALP SERPL-CCNC: 63 U/L (ref 40–150)
ALT SERPL W P-5'-P-CCNC: 14 U/L (ref 0–70)
AST SERPL W P-5'-P-CCNC: 15 U/L (ref 0–45)
BASOPHILS # BLD AUTO: 0.1 10E3/UL (ref 0–0.2)
BASOPHILS NFR BLD AUTO: 1 %
BILIRUB DIRECT SERPL-MCNC: <0.2 MG/DL (ref 0–0.3)
BILIRUB SERPL-MCNC: 0.3 MG/DL
CRP SERPL-MCNC: <3 MG/L
EOSINOPHIL # BLD AUTO: 0.5 10E3/UL (ref 0–0.7)
EOSINOPHIL NFR BLD AUTO: 6 %
ERYTHROCYTE [DISTWIDTH] IN BLOOD BY AUTOMATED COUNT: 12.9 % (ref 10–15)
ERYTHROCYTE [SEDIMENTATION RATE] IN BLOOD BY WESTERGREN METHOD: 10 MM/HR (ref 0–20)
HCT VFR BLD AUTO: 43.5 % (ref 35–53)
HGB BLD-MCNC: 14.3 G/DL (ref 11.7–17.7)
IMM GRANULOCYTES # BLD: 0 10E3/UL
IMM GRANULOCYTES NFR BLD: 0 %
LYMPHOCYTES # BLD AUTO: 1.4 10E3/UL (ref 0.8–5.3)
LYMPHOCYTES NFR BLD AUTO: 19 %
MCH RBC QN AUTO: 30.3 PG (ref 26.5–33)
MCHC RBC AUTO-ENTMCNC: 32.9 G/DL (ref 31.5–36.5)
MCV RBC AUTO: 92 FL (ref 78–100)
MONOCYTES # BLD AUTO: 0.5 10E3/UL (ref 0–1.3)
MONOCYTES NFR BLD AUTO: 6 %
NEUTROPHILS # BLD AUTO: 4.9 10E3/UL (ref 1.6–8.3)
NEUTROPHILS NFR BLD AUTO: 67 %
PLATELET # BLD AUTO: 377 10E3/UL (ref 150–450)
PROT SERPL-MCNC: 8.1 G/DL (ref 6.4–8.3)
RBC # BLD AUTO: 4.72 10E6/UL (ref 3.8–5.9)
WBC # BLD AUTO: 7.3 10E3/UL (ref 4–11)

## 2023-11-29 PROCEDURE — 86140 C-REACTIVE PROTEIN: CPT

## 2023-11-29 PROCEDURE — 85025 COMPLETE CBC W/AUTO DIFF WBC: CPT

## 2023-11-29 PROCEDURE — 36415 COLL VENOUS BLD VENIPUNCTURE: CPT

## 2023-11-29 PROCEDURE — 80076 HEPATIC FUNCTION PANEL: CPT

## 2023-11-29 PROCEDURE — 85652 RBC SED RATE AUTOMATED: CPT

## 2023-12-04 LAB

## 2023-12-04 PROCEDURE — 87507 IADNA-DNA/RNA PROBE TQ 12-25: CPT

## 2023-12-04 PROCEDURE — 87493 C DIFF AMPLIFIED PROBE: CPT | Mod: 59

## 2023-12-05 DIAGNOSIS — K57.30 SEGMENTAL COLITIS ASSOCIATED WITH DIVERTICULOSIS (H): ICD-10-CM

## 2023-12-05 DIAGNOSIS — K50.10 SEGMENTAL COLITIS ASSOCIATED WITH DIVERTICULOSIS (H): ICD-10-CM

## 2023-12-05 RX ORDER — HYDROCORTISONE 100 MG/60ML
100 SUSPENSION RECTAL AT BEDTIME
Qty: 1800 ML | Refills: 0 | Status: SHIPPED | OUTPATIENT
Start: 2023-12-05 | End: 2024-01-05

## 2023-12-05 NOTE — PROGRESS NOTES
Called and discussed with patient.  Patient has a colonoscopy coming up on December 20, 2023.  Patient has significant symptoms with loose stools and blood in the stool.  She has an upcoming trip this weekend and would like something to help with symptoms.  She reports that Cortenemas worked in the past and we will proceed with starting these.  We will keep this in mind with her upcoming colonoscopy that these were used prior to the exam.      Gerhard Nicolas PA-C  Division of Gastroenterology, Hepatology and Nutrition  [facility]

## 2023-12-08 NOTE — TELEPHONE ENCOUNTER
See provider's documentation on 12/5/23. Order placed for cortenemas. The plan has been communicated to the patient.

## 2023-12-11 ENCOUNTER — TELEPHONE (OUTPATIENT)
Dept: GASTROENTEROLOGY | Facility: CLINIC | Age: 48
End: 2023-12-11
Payer: COMMERCIAL

## 2023-12-11 NOTE — TELEPHONE ENCOUNTER
Patient had ED visit for asthma exacerbation 10/9/23, per anesthesia ok to continue with procedure as scheduled. Did also verify ok to proceed as patients chart noted splenic artery aneurysm 9/21/23. Per anesthesia ok to proceed.     Attempted to contact patient in order to complete pre assessment questions.     No answer. Left message to return call to [phone] option 4      Procedure details:    Patient scheduled for Colonoscopy  on 12/20/23.     Arrival time: 0745. Procedure time 0845    Pre op exam needed? N/A    [facility] [address] [place] [address], [address], [place] [address]    Sedation type: Conscious sedation     Indication for procedure: segmental colitis associated with diverticulosis      Chart review:     Electronic implanted devices? No    Recent diagnosis of diverticulitis within the last 6 weeks? No    Diabetic? No      Medication review:    Anticoagulants? No    NSAIDS? No NSAID medications per patient's medication list.  RN will verify with pre-assessment call.    Other medication HOLDING recommendations:  N/A      Prep for procedure:     Bowel prep recommendation: Standard Miralax  Due to: standard bowel prep.    Prep instructions sent via [email or web address].     Veena Horne RN  Endoscopy Procedure Pre Assessment RN

## 2023-12-12 NOTE — TELEPHONE ENCOUNTER
Pre assessment completed for upcoming procedure.      Procedure details:    Patient scheduled for Colonoscopy  on 12/20/23.     Arrival time: 0745. Procedure time 0845     Pre op exam needed? N/A     Facility location: [facility]; [address] [place] [address], [address], [place] [address]     Sedation type: Conscious sedation     COVID policy reviewed.    Designated  policy reviewed. Instructed to have someone stay 6 hours post procedure.       Prep for procedure:     Reviewed procedure prep instructions.     Patient verbalized understanding and had no questions or concerns at this time.        Sarah Aldana RN  Endoscopy Procedure Pre Assessment RN  [phone] option 4

## 2023-12-13 ENCOUNTER — MYC MEDICAL ADVICE (OUTPATIENT)
Dept: GASTROENTEROLOGY | Facility: CLINIC | Age: 48
End: 2023-12-13
Payer: COMMERCIAL

## 2023-12-14 ENCOUNTER — LAB (OUTPATIENT)
Dept: LAB | Facility: CLINIC | Age: 48
End: 2023-12-14
Payer: COMMERCIAL

## 2023-12-14 DIAGNOSIS — K57.30 SEGMENTAL COLITIS ASSOCIATED WITH DIVERTICULOSIS (H): Primary | ICD-10-CM

## 2023-12-14 DIAGNOSIS — K50.10 SEGMENTAL COLITIS ASSOCIATED WITH DIVERTICULOSIS (H): ICD-10-CM

## 2023-12-14 DIAGNOSIS — K57.30 SEGMENTAL COLITIS ASSOCIATED WITH DIVERTICULOSIS (H): ICD-10-CM

## 2023-12-14 DIAGNOSIS — K50.10 SEGMENTAL COLITIS ASSOCIATED WITH DIVERTICULOSIS (H): Primary | ICD-10-CM

## 2023-12-14 LAB
BASOPHILS # BLD AUTO: 0 10E3/UL (ref 0–0.2)
BASOPHILS NFR BLD AUTO: 0 %
EOSINOPHIL # BLD AUTO: 0.1 10E3/UL (ref 0–0.7)
EOSINOPHIL NFR BLD AUTO: 1 %
ERYTHROCYTE [DISTWIDTH] IN BLOOD BY AUTOMATED COUNT: 13.4 % (ref 10–15)
HCT VFR BLD AUTO: 42.2 % (ref 35–53)
HGB BLD-MCNC: 14.3 G/DL (ref 11.7–17.7)
IMM GRANULOCYTES # BLD: 0 10E3/UL
IMM GRANULOCYTES NFR BLD: 0 %
LYMPHOCYTES # BLD AUTO: 1 10E3/UL (ref 0.8–5.3)
LYMPHOCYTES NFR BLD AUTO: 13 %
MCH RBC QN AUTO: 30.4 PG (ref 26.5–33)
MCHC RBC AUTO-ENTMCNC: 33.9 G/DL (ref 31.5–36.5)
MCV RBC AUTO: 90 FL (ref 78–100)
MONOCYTES # BLD AUTO: 1.3 10E3/UL (ref 0–1.3)
MONOCYTES NFR BLD AUTO: 18 %
NEUTROPHILS # BLD AUTO: 4.8 10E3/UL (ref 1.6–8.3)
NEUTROPHILS NFR BLD AUTO: 68 %
PLATELET # BLD AUTO: 272 10E3/UL (ref 150–450)
RBC # BLD AUTO: 4.71 10E6/UL (ref 3.8–5.9)
WBC # BLD AUTO: 7.2 10E3/UL (ref 4–11)

## 2023-12-14 PROCEDURE — 36415 COLL VENOUS BLD VENIPUNCTURE: CPT

## 2023-12-14 PROCEDURE — 83993 ASSAY FOR CALPROTECTIN FECAL: CPT

## 2023-12-14 PROCEDURE — 85025 COMPLETE CBC W/AUTO DIFF WBC: CPT

## 2023-12-14 PROCEDURE — 86140 C-REACTIVE PROTEIN: CPT

## 2023-12-14 RX ORDER — BUDESONIDE 9 MG/1
9 TABLET, FILM COATED, EXTENDED RELEASE ORAL EVERY MORNING
Qty: 30 TABLET | Refills: 0 | Status: SHIPPED | OUTPATIENT
Start: 2023-12-14 | End: 2023-12-15 | Stop reason: ALTCHOICE

## 2023-12-14 NOTE — PROGRESS NOTES
Spoke with patient. Have 12-15 bloody stools per day. Has chills, no reported fevers.  Cdiff and enteric panel negative. Fecal dakota for some reason fell off the orders. Plan for the following:    CBC, CRP, and fecal dakota  Ordered Uceris PO    Colonoscopy 12/20/2023      Gerhard Nicolas PA-C  Division of Gastroenterology, Hepatology and Nutrition  [facility]

## 2023-12-15 ENCOUNTER — MYC MEDICAL ADVICE (OUTPATIENT)
Dept: GASTROENTEROLOGY | Facility: CLINIC | Age: 48
End: 2023-12-15
Payer: COMMERCIAL

## 2023-12-15 DIAGNOSIS — E71.312 SCAD (SHORT-CHAIN ACYL-COA DEHYDROGENASE DEFICIENCY) (H): Primary | ICD-10-CM

## 2023-12-15 LAB
CALPROTECTIN STL-MCNT: 3270 MG/KG (ref 0–49.9)
CRP SERPL-MCNC: 35.9 MG/L

## 2023-12-15 RX ORDER — PREDNISONE 5 MG/1
TABLET ORAL
Qty: 136 TABLET | Refills: 0 | Status: SHIPPED | OUTPATIENT
Start: 2023-12-15 | End: 2024-01-16

## 2023-12-15 NOTE — TELEPHONE ENCOUNTER
Fecal calprotectin 3,270. Discussed result with Gerhard Nicolas. Plan to discontinue PO Uceris (patient had not yet started) and escalate to prednisone right away. New prescription placed. Called patient to relay the plan. She is agreeable to starting prednisone and continuing hydrocortisone enemas. Colonoscopy 12/20/23.

## 2023-12-20 ENCOUNTER — HOSPITAL ENCOUNTER (OUTPATIENT)
Facility: AMBULATORY SURGERY CENTER | Age: 48
Discharge: HOME | End: 2023-12-20
Attending: SURGERY | Admitting: SURGERY
Payer: COMMERCIAL

## 2023-12-20 VITALS
OXYGEN SATURATION: 100 % | BODY MASS INDEX: 19.62 KG/M2 | HEART RATE: 69 BPM | HEIGHT: 67 IN | DIASTOLIC BLOOD PRESSURE: 74 MMHG | TEMPERATURE: 96.9 F | RESPIRATION RATE: 16 BRPM | SYSTOLIC BLOOD PRESSURE: 111 MMHG | WEIGHT: 125 LBS

## 2023-12-20 DIAGNOSIS — Z12.11 COLON CANCER SCREENING: Primary | ICD-10-CM

## 2023-12-20 LAB — COLONOSCOPY: NORMAL

## 2023-12-20 PROCEDURE — 88305 TISSUE EXAM BY PATHOLOGIST: CPT | Mod: TC | Performed by: SURGERY

## 2023-12-20 PROCEDURE — 88305 TISSUE EXAM BY PATHOLOGIST: CPT | Mod: 26 | Performed by: STUDENT IN AN ORGANIZED HEALTH CARE EDUCATION/TRAINING PROGRAM

## 2023-12-20 PROCEDURE — 45331 SIGMOIDOSCOPY AND BIOPSY: CPT

## 2023-12-20 RX ORDER — ONDANSETRON 2 MG/ML
4 INJECTION INTRAMUSCULAR; INTRAVENOUS
Status: DISCONTINUED | OUTPATIENT
Start: 2023-12-20 | End: 2023-12-21 | Stop reason: HOSPADM

## 2023-12-20 RX ORDER — SODIUM CHLORIDE, SODIUM LACTATE, POTASSIUM CHLORIDE, CALCIUM CHLORIDE 600; 310; 30; 20 MG/100ML; MG/100ML; MG/100ML; MG/100ML
INJECTION, SOLUTION INTRAVENOUS CONTINUOUS
Status: DISCONTINUED | OUTPATIENT
Start: 2023-12-20 | End: 2023-12-21 | Stop reason: HOSPADM

## 2023-12-20 RX ORDER — FENTANYL CITRATE 50 UG/ML
INJECTION, SOLUTION INTRAMUSCULAR; INTRAVENOUS PRN
Status: DISCONTINUED | OUTPATIENT
Start: 2023-12-20 | End: 2023-12-20 | Stop reason: HOSPADM

## 2023-12-20 RX ORDER — LIDOCAINE 40 MG/G
CREAM TOPICAL
Status: DISCONTINUED | OUTPATIENT
Start: 2023-12-20 | End: 2023-12-21 | Stop reason: HOSPADM

## 2023-12-20 RX ADMIN — SODIUM CHLORIDE, SODIUM LACTATE, POTASSIUM CHLORIDE, CALCIUM CHLORIDE: 600; 310; 30; 20 INJECTION, SOLUTION INTRAVENOUS at 07:56

## 2023-12-21 ENCOUNTER — DOCUMENTATION ONLY (OUTPATIENT)
Dept: GASTROENTEROLOGY | Facility: CLINIC | Age: 48
End: 2023-12-21
Payer: COMMERCIAL

## 2023-12-21 ENCOUNTER — TELEPHONE (OUTPATIENT)
Dept: GASTROENTEROLOGY | Facility: CLINIC | Age: 48
End: 2023-12-21
Payer: COMMERCIAL

## 2023-12-21 LAB
PATH REPORT.COMMENTS IMP SPEC: NORMAL
PATH REPORT.COMMENTS IMP SPEC: NORMAL
PATH REPORT.FINAL DX SPEC: NORMAL
PATH REPORT.GROSS SPEC: NORMAL
PATH REPORT.MICROSCOPIC SPEC OTHER STN: NORMAL
PATH REPORT.RELEVANT HX SPEC: NORMAL
PHOTO IMAGE: NORMAL

## 2023-12-21 NOTE — PROGRESS NOTES
Spoke with the patient and we decided on the following:  -- Proceed with steroid taper  -- Restart PO Lialda   -- Rowasa nightly    If no improvement in symptoms/objective markers do not improve in the next 3-6 months, will then look to escalate therapy pending bx results.       Gerhard Nicolas PA-C  Division of Gastroenterology, Hepatology and Nutrition  [facility]

## 2023-12-21 NOTE — TELEPHONE ENCOUNTER
[facility] Call Center    Phone Message    May a detailed message be left on voicemail: yes     Reason for Call: Medication Refill Request    Has the patient contacted the pharmacy for the refill? Yes   Name of medication being requested:  mesalamine (LIALDA) 1.2 g DR tablet   Provider who prescribed the medication: Gerhard SMITH  Pharmacy: [facility]/pharmacy #[id number] [place] [address]   Date medication is needed: asap       Action Taken: Message routed to:  [facility] ([facility] gi    Travel Screening: Not Applicable

## 2024-01-05 ENCOUNTER — MYC REFILL (OUTPATIENT)
Dept: GASTROENTEROLOGY | Facility: CLINIC | Age: 49
End: 2024-01-05
Payer: COMMERCIAL

## 2024-01-05 DIAGNOSIS — K50.10 SEGMENTAL COLITIS ASSOCIATED WITH DIVERTICULOSIS (H): ICD-10-CM

## 2024-01-05 DIAGNOSIS — K57.30 SEGMENTAL COLITIS ASSOCIATED WITH DIVERTICULOSIS (H): ICD-10-CM

## 2024-01-09 RX ORDER — HYDROCORTISONE 100 MG/60ML
100 SUSPENSION RECTAL AT BEDTIME
Qty: 1800 ML | Refills: 0 | Status: SHIPPED | OUTPATIENT
Start: 2024-01-09 | End: 2024-03-25

## 2024-01-09 NOTE — TELEPHONE ENCOUNTER
Rx Refill Request:   hydrocortisone   Last Written Prescription Date: 12/5/23  Last Fill Quantity:  1800 ml,   # refills: 0    Future Office visit: 1/17/24  Last Office Visit :  11/14/23  Dx: GISELLE  Plan:  --- Colonoscopy in Dec  --- Continue rowasa enemas, pending scope finding, may restart lialda  --- CBC, LFTs, CRP, ESR

## 2024-01-09 NOTE — TELEPHONE ENCOUNTER
1/5/24  Patient Comment: Hi - we had discussed filling these through my appointment time on the 17th. Im making progress, bur not 100% there. These targeted enemas I feel do the most good compared with the oral prednisone. I don t need 30 days, maybe 2 weeks to get me to the 17th? -Tyree Nicolas,  Please review and sign pended Cortema rx refill if you agree.    Thank you.  Rashid

## 2024-01-17 ENCOUNTER — LAB (OUTPATIENT)
Dept: LAB | Facility: CLINIC | Age: 49
End: 2024-01-17
Payer: COMMERCIAL

## 2024-01-17 ENCOUNTER — OFFICE VISIT (OUTPATIENT)
Dept: GASTROENTEROLOGY | Facility: CLINIC | Age: 49
End: 2024-01-17
Payer: COMMERCIAL

## 2024-01-17 VITALS
WEIGHT: 136 LBS | HEIGHT: 67 IN | SYSTOLIC BLOOD PRESSURE: 107 MMHG | DIASTOLIC BLOOD PRESSURE: 71 MMHG | OXYGEN SATURATION: 95 % | BODY MASS INDEX: 21.35 KG/M2

## 2024-01-17 DIAGNOSIS — K50.10 SEGMENTAL COLITIS WITHOUT COMPLICATION (H): Primary | ICD-10-CM

## 2024-01-17 DIAGNOSIS — K50.10 SEGMENTAL COLITIS ASSOCIATED WITH DIVERTICULOSIS (H): ICD-10-CM

## 2024-01-17 DIAGNOSIS — K57.30 SEGMENTAL COLITIS ASSOCIATED WITH DIVERTICULOSIS (H): ICD-10-CM

## 2024-01-17 DIAGNOSIS — K50.10 SEGMENTAL COLITIS WITHOUT COMPLICATION (H): ICD-10-CM

## 2024-01-17 PROCEDURE — 99214 OFFICE O/P EST MOD 30 MIN: CPT | Performed by: PHYSICIAN ASSISTANT

## 2024-01-17 RX ORDER — MESALAMINE 1.2 G/1
4800 TABLET, DELAYED RELEASE ORAL DAILY
Qty: 360 TABLET | Refills: 3 | Status: SHIPPED | OUTPATIENT
Start: 2024-01-17 | End: 2024-05-14

## 2024-01-17 ASSESSMENT — PAIN SCALES - GENERAL: PAINLEVEL: NO PAIN (0)

## 2024-01-17 NOTE — NURSING NOTE
"Chief Complaint   Patient presents with    Follow Up       Vitals:    01/17/24 1118   BP: 107/71   SpO2: 95%   Weight: 61.7 kg (136 lb)   Height: 1.702 m (5' 7\")       Body mass index is 21.3 kg/m .    [employer]    "

## 2024-01-17 NOTE — PATIENT INSTRUCTIONS
It was a pleasure taking care of you today.  I've included a brief summary of our discussion and care plan from today's visit below.  Please review this information with your primary care provider.  ______________________________________________________________________    My recommendations are summarized as follows:    -- Continue lialda 4 pills daily and rowasa nightly  -- Labs this Fall   -- stool sample this summer (June-ish)  -- Patient with IBD we recommend supplementation vitamin D 1000 units daily and calcium 500 mg twice daily.  -- No NSAIDs (ibuprofen, or anything containing ibuprofen)    For additional resources about inflammatory bowel disease visit http://www.crohnscolitisfoundation.org/    To learn more about Diet and Nutrition in the setting of IBD, check out some of these resources:  https://www.crohnscolitisfoundation.org/diet-and-nutrition/what-should-i-eat  https://www.nimbal.org/  https://ntforibd.org/         Return to GI Clinic in 6 months to review your progress.    ______________________________________________________________________    How do I schedule labs, imaging studies, or procedures that were ordered in clinic today?     Labs: To schedule lab appointment at the [facility], use my chart or call [phone]. If you have a [facility] lab closer to home where you are regularly seen you can give them a call.     Procedures: If a colonoscopy, upper endoscopy, breath test, esophageal manometry, or pH impedence was ordered today, our endoscopy team will call you to schedule this. If you have not heard from our endoscopy team within a week, please call [phone] to schedule.     Imaging Studies: If you were scheduled for a CT scan, X-ray, MRI, ultrasound, HIDA scan or other imaging study, please call [phone] to have this scheduled.     Referral: If a referral to another specialty was ordered, expect a phone call or follow instructions above. If you have not heard from  anyone regarding your referral in a week, please call our clinic to check the status.     Who do I call with any questions after my visit?  Please be in touch if there are any further questions that arise following today's visit.  There are multiple ways to contact your gastroenterology care team.      During business hours, you may reach a Gastroenterology nurse at [phone]    To schedule or reschedule an appointment, please call [phone].     You can always send a secure message through Perkle.  Perkle messages are answered by your nurse or doctor typically within 24 hours.  Please allow extra time on weekends and holidays.      For urgent/emergent questions after business hours, you may reach the on-call GI Fellow by contacting the   at [phone].     How will I get the results of any tests ordered?    You will receive all of your results.  If you have signed up for OneTagt, any tests ordered at your visit will be available to you after your physician reviews them.  Typically this takes 1-2 weeks.  If there are urgent results that require a change in your care plan, your physician or nurse will call you to discuss the next steps.      What is Perkle?  Perkle is a secure way for you to access all of your healthcare records from the [facility].  It is a web based computer program, so you can sign on to it from any location.  It also allows you to send secure messages to your care team.  I recommend signing up for Perkle access if you have not already done so and are comfortable with using a computer.         Sincerely,    Gerhard Nicolas PA-C  [facility]  Division of Gastroenterology

## 2024-01-17 NOTE — PROGRESS NOTES
IBD CLINIC VISIT -- follow up    CHIEF COMPLAINT: SCAD evaluation and management    SCAD HISTORY  Age at diagnosis: 44  Extent of disease: SCAD, left sided rui-diverticular inflammation  Current SCAD medications: lialda 4.8g + rowasa  ( previously on Lialda 4.8 G daily, self discontinued in March 2023)  Prior SCAD Medications: Cipro/flagyl (helped)    DISEASE ASSESSMENT  Labs:  Recent Labs   Lab Test 12/14/23  1334 11/29/23  1602 10/09/23  1339 09/18/23  1101 06/19/19  1009 03/18/19  1652 06/20/18  1611   CRP  --   --   --   --  <2.9  --  0.6   SED  --  10  --  15 9  --  20   HGB 14.3 14.3   < > 15.6  --    < >  --     < > = values in this interval not displayed.     Endoscopic assessment:   Colonoscopy 12/20/2023 with congested erythematous eroded friable mucosa in the sigmoid and distal rectum.  Biopsies show moderate active inflammation.    8/2019 icscope showed Dee 2 colitis from 30 cm-40cm. Diverticulosis in the sigmoid. Rest of colon was normal.     PATH:  SPECIMEN(S):   A: Cecal biopsy   B: Colon biopsy, ascending   C: Colon biopsy, transverse   D: Colon biopsy, descending   E: Sigmoid colon biopsy   F: Rectal biopsy     FINAL DIAGNOSIS:   A. Cecum, biopsy:   Colonic mucosa with no pathologic abnormalities     B. Ascending Colon, Biopsy:   Colonic mucosa with no pathologic abnormalities     C. Transverse Colon, biopsy:   Colonic mucosa with no pathologic abnormalities     D. Descending Colon, biopsy:   Colonic mucosa with no pathologic abnormalities     E. Sigmoid Colon, biopsy:   Colitis with crypt injury, moderately active, with rectum sparing (see   part F below); consistent with SCAD   (segmental colitisassociated diverticulosis); negative for dysplasia     F. Rectum, biopsy:   Rectal mucosa with no pathologic abnormalities     EGD 4/15/19 Normal, normal biopsies of esophagus, stomach and duodenum  3/25/19 colonoscopy for rectal bleeding: left sided diverticular with edema, erythema, loss of vascular  pattern noted in 2 segments (40-36 cm and 30-22 cm from the anus.    CT ab/pelvis 3/5/19:  Renal hypodensities stable from 4/16/19 exam, 2 mm calculus in right kidney, no gastric or small bowel abnormality  Fecal calprotectin: pending  C diff: negative per OSH records    ASSESSMENT/PLAN  Mrs. Salter is a 48 year old woman with history of cholecystectomy, 3 uncomplicated vaginal deliveries, thyroid cysts (per patient), and segmental colitis associated with diverticular disease who is following up for management of her disease.  She is on SCD diet.     Shortly after her last visit she had an uptick of symptoms with urgent bloody stools.  Fecal calprotectin greater than 3000.  Flex sig showed moderate active inflammation in the rectosigmoid.  She responded well to a short course of prednisone and restarting her Lialda in addition to continuation of Rowasa enemas.    Current medication: Lialda 4.8 g a day and Rowasa enemas nightly  Last endoscopic disease activity: Colonoscopy 12/20/2023 with congested erythematous eroded friable mucosa in the sigmoid and distal rectum.  Biopsies show moderate active inflammation.    We will proceed with the following plan:     Plan:  --- Continue Lialda 4.8 g/day   --- Continue rowasa enemas  --- Fecal calprotectin to be obtained this summer to trend.  If normal we will continue with the above regimen, if persistently elevated we will discuss alternative therapies, likely biologic  --- Labs CBC, LFTs, CRP, ESR + creatinine this fall    Return to clinic in 6 months    Thank you for this consultation.  30 minutes spent on the date of the encounter doing chart review, history and exam, documentation and further activities as noted above.  It was a pleasure to participate in the care of this patient; please contact us with any further questions.      Gerhard Nicolas PA-C  Division of Gastroenterology, Hepatology and Nutrition  [facility]      HPI:   Mrs. Salter is a 48 year old  "woman with history of cholecystectomy, 3 uncomplicated vaginal deliveries, thyroid cysts (per patient), and recent diagnosis of segmental colitis associated with diverticular disease who is establishing care for management of her disease.    Per chart:  Patient seen about 2014 at [facility] for diverticulitis and last seen 5/2019 for follow up of SCAD and nausea.  Patient had colonoscopy 3/2019 with finding of edema, erythema, loss of and loss of vascular patter in 2 segments of left colon with mild to moderate diverticulosis.  Sigmoid biopsies showed mildly active chronic colitis consistent with SCAD.  Patient was treated with 10 day course of cipro/flagyl with improvement in symptoms followed by Lialda 4.8 G daily with possible improvement. On 4/11/19 the patient was found to be c.diff negative.      At time of consultation, patient reported frustation with lack of symptom improvement and communication at prior GI providers office. She presented here.  Patient reports 5 years of alternating diarrhea and constipation along with occassional undigested food in stool and borrygymi.  She reports had initial improvement in symptoms following cipro/flagyl and mesalamine but called in as still was having blood streaked stools for which she was started on rowasa enemas.  She is concerned she is still flaring despite the therapies and putting herself on a low residue \"specific carbohydrate diet\" supplemented with \"high protein predigested cancer shakes\".  She reports weight loss in the setting of biking 12 miles daily and changed diet.    At time of consultation the patient reports two semiformed stools daily with blood streaks in all stools (10% blood) and with wiping.  She notes one day a week she will have bloating, increased flatus, general malaise, 5-6 watery BM's along with no associated abdominal pain.      Patient also reports 10 years of chronic food getting stuck in her esophagus until drinks some water with the food.  " It doesn't happen with draper, but rather typically with meats.  She has never had a food impaction.  Happens with every meal.  No odynophagia, no nausea currently, no vomiting, rare reflux/heartburn. PPI not helpful in the past.  Reports was having nausea but improved with simple carbohydrate diet.  Reports when was nauseated, it was typically in the morning and improved with a protein breakfast sandwich.    Prior report of dysphagia and nausea,  Had upper endoscopy 2019, 1 gastric polyp (fundic gland).  Separate mid/distal esophageal, gastric and duodenal biopsies negative.    Patient notes Hx of thyroid cysts and borderline TSH levels.       Social hx   -no smoking  -1-2 x month nsaids  -rare alcohol   -no illicit  -Bike 12 miles a day, weights    Fam Hx  Mother with history colitis - no issues in years, colon polyps  Maternal grandfather  of colon cancer at age 76  Both parents have diverticulitis    Interval history, 2019  In August, started cipro x 2 weeks, flagyl x 2 weeks, lialda and prednisone. Notices increased pain when delays Lialda by a few hours (increased pain). Now down to prednisone 10 mg daily, going to 5 mg daily on Friday. Feels much improved. Blood has stopped; left sided pain has improved. Gained 5 lb since August. Continues on SCD; met with Rebekah Browne.    No upcoming travel plans.     Interval history, 2019  Continues on SCD, which has helped a lot.     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   Off prednisone x 3 weeks. Continues on Cortenema daily x 2 months.   Also on Lialda 4.8 g daily.     Having 2 BMs per day (non-bloody, well-formed). No blood in stool since August.   Gained a few lb since Sep.     Interval history, 2020 (virtual visit)  Got sick in Dec with wheezing and was dx with walking PNA . Treated with abx and then prednisone x 5 days.   Continues on 5 mg prednisone daily and has difficulty weaning off it due to recurrent abd pain. Has  been on prednisone 5 mg x 1 month.     Having 2 BMs per day, non bloody, loose.     For SCAD, currently on Lialda 4.8g and Rowasa enemas.     Interval history, 12/2020 (virtual visit)  In beginning of December, had a flare in the setting of stress, with increased bowel frequency and LLQ abd pain. Started on Rowasa enemas with significant help. But around 12/25 ate a piece of cake and developed postprandial nausea and epigastric discomfort.      Continues on Lialda 4.8g daily and daily Rowasa enemas.     Currently having 2 BMs per day, soft. No blood.     EGD in 4/2019 was normal.     Interval history, 3/24/22  She has continued with SCD (since Aug 2019) and feels that symptoms are quite stable. She continues with lialda 4.8g daily and rowasa enemas every other night. Currently having 2 stools per day, formed no blood. No abdominal pain. If she deviates from her diet, then she may have some deviation from her baseline, but overall very stable.     She has bad migraines and is currently has one today.    Interval hx 10/26/22  Symptoms over the last 6 months. 1-3 stools per day, slightly looser than normal, variable consistency from more firm to looser stools.  No blood in the stool.  No urgency or nighttime stool.  She has been consistent and compliant with lialda and rowasa. Over the last 6 months she has had discomfort in lower left quadrant. Very tired, minimal appetite. Trying to minimize grains and sugar.    A lot of stress with father in law passing and coworker passing suddenly. Submitted message through portal to get rx for steroids and antibiotics which has worked well in the past.     Interval hx 3/29/23  Cipro/flagyl allowed for improvement (rifaximin denied). Has been quite stable other than 2 weeks at end of Feb 2023. Bowel pattern fluctuates with what she perceives to be her stress level. Avg 2 BM daily that are formed. No blood in the stool. She continues with lialda 4.8g daily and rowasa  daily.    Interval hx 9/12/23  Earlier last week began having abdominal discomfort in lower abdomen.  Over the weekend had moderate to severe pain across abdomen. Had chills, but did not take temp.Only doing rowasa enemas, self d/c'd lialda March 2023 bc due for blood work but ultimately did not complete. Rested and hydrated and seems to be improving. LLQ pain is still there and slightly worse than normal but improving. No fever or chills.    Interval hx 11/14/23  She continues with rowasa enemas (Holding lialda for now). Bowel pattern is stable, no urgency. No urgency, blood in the stool or nighttime stools. No fevers or chills.   Continues on SCD.  Busy with school, computer science degree.     Interval hx 1/17/24  Had a flare shortly after last visit prompting a flexible sigmoidoscopy showing moderate active inflammation in the rectosigmoid requiring prednisone.  She was able to successfully taper off prednisone and continue with Lialda and Rowasa enemas.  Currently having 2 stools per day, soft serve. No blood in the stool for the last 2 weeks. Off prednisone now. Consistent with Rowasa enemas and Lialda daily. No abdominal pain. No fevers.     ROS:    No fevers or chills  No weight loss  No blurry vision, double vision or change in vision  No sore throat  No lymphadenopathy  No shortness of breath or wheezing  No chest pain or pressure  No arthralgias or myalgias  No rashes or skin changes  No dysphagia  No BRBPR, hematochezia  No dysuria, frequency or urgency  No hot/cold intolerance or polyria  No anxiety or depression    Extra intestinal manifestations of IBD:  No uveitis/episcleritis  No aphthous ulcers   No arthritis   No erythema nodosum/pyoderma gangrenosum.     PERTINENT PAST MEDICAL HISTORY:  None     PREVIOUS SURGERIES:  Past Surgical History:   Procedure Laterality Date    CHOLECYSTECTOMY      COLONOSCOPY      COLONOSCOPY N/A 8/12/2019    Procedure: COLONOSCOPY, WITH POLYPECTOMY AND BIOPSY;  Surgeon:  Yolanda Molina MD;  Location: [facility] OR    HC REMOVAL GALLBLADDER      Description: Cholecystectomy;  Recorded: 04/06/2011;     Surgical hx  -Cholecystectomy 2010  -3 children - vaginal x 3, Bladder sling placed 2016    PREVIOUS ENDOSCOPY:  See above    ALLERGIES:     Allergies   Allergen Reactions    Sulfa Antibiotics Hives     Other reaction(s): *Unknown       PERTINENT MEDICATIONS:    Current Outpatient Medications:     albuterol (PROAIR HFA/PROVENTIL HFA/VENTOLIN HFA) 108 (90 Base) MCG/ACT inhaler, Inhale 2 puffs into the lungs every 4 hours as needed for shortness of breath, wheezing or cough, Disp: 18 g, Rfl: 0    amitriptyline (ELAVIL) 10 MG tablet, Take 10 mg by mouth, Disp: , Rfl:     butalbital-acetaminophen-caffeine (FIORICET/ESGIC) -40 MG tablet, Take 1 tablet by mouth, Disp: , Rfl:     hydrocortisone (CORTENEMA) 100 MG/60ML enema, Place 1 enema rectally at bedtime, Disp: 1800 mL, Rfl: 0    mesalamine (LIALDA) 1.2 g DR tablet, Take 4 tablets (4,800 mg) by mouth daily, Disp: 360 tablet, Rfl: 3    mesalamine (ROWASA) 4 g enema, Place 1 enema (4 g) rectally At Bedtime, Disp: 5400 mL, Rfl: 1    Multiple Vitamins-Iron (MULTIVITAMIN/IRON PO), Take 1 tablet by mouth, Disp: , Rfl:     ACIDOPHILUS LACTOBACILLUS PO, Take 1 capsule by mouth (Patient not taking: Reported on 3/29/2023), Disp: , Rfl:     albuterol (PROAIR HFA/PROVENTIL HFA/VENTOLIN HFA) 108 (90 Base) MCG/ACT inhaler, , Disp: , Rfl:     omeprazole (PRILOSEC) 40 MG DR capsule, Take 1 capsule (40 mg) by mouth daily (Patient not taking: Reported on 10/26/2022), Disp: 90 capsule, Rfl: 1    SOCIAL HISTORY:  Social History     Socioeconomic History    Marital status:      Spouse name: Not on file    Number of children: Not on file    Years of education: Not on file    Highest education level: Not on file   Occupational History    Not on file   Tobacco Use    Smoking status: Never    Smokeless tobacco: Never   Substance and Sexual Activity     "Alcohol use: Yes     Comment: occasional    Drug use: Never    Sexual activity: Not on file   Other Topics Concern    Parent/sibling w/ CABG, MI or angioplasty before 65F 55M? Not Asked   Social History Narrative    Not on file     Social Determinants of Health     Financial Resource Strain: Not on file   Food Insecurity: Not on file   Transportation Needs: Not on file   Physical Activity: Not on file   Stress: Not on file   Social Connections: Not on file   Interpersonal Safety: Not on file   Housing Stability: Not on file     FAMILY HISTORY:  Family History   Problem Relation Age of Onset    Diverticulitis Father     Inflammatory Bowel Disease No family hx of     Colon Cancer No family hx of     Coronary Artery Disease Father     Cerebrovascular Disease Maternal Grandfather     Cerebrovascular Disease Paternal Grandfather        PHYSICAL EXAMINATION:  Constitutional: aaox3, cooperative, pleasant, not dyspneic/diaphoretic, no acute distress  Vitals reviewed: /71   Ht 1.702 m (5' 7\")   Wt 61.7 kg (136 lb)   SpO2 95%   BMI 21.30 kg/m    Wt:   Wt Readings from Last 2 Encounters:   01/17/24 61.7 kg (136 lb)   12/20/23 56.7 kg (125 lb)      Constitutional - general appearance is well and in no acute distress. Body habitus normal  Eyes - No redness or discharge  Respiratory - No cough, unlabored breathing  Musculoskeletal - range of motion intact: Neck and arms  Skin - No discoloration or lesions  Neurological - No tremors, headaches  Psychiatric - No anxiety, alert & oriented    PERTINENT STUDIES:    Most recent hepatic panel:  Recent Labs   Lab Test 11/29/23  1602 09/18/23  1101   ALT 14 12   AST 15 17     Most recent creatinine:  Recent Labs   Lab Test 10/09/23  1339 09/18/23  1101   CR 0.60 0.81                       "

## 2024-01-17 NOTE — LETTER
1/17/2024         RE: Velma Mcfarlane  [address] [facility] [id number]        Dear Colleague,    Thank you for referring your patient, Velma Mcfarlane, to the [facility] [place]. Please see a copy of my visit note below.    IBD CLINIC VISIT -- follow up    CHIEF COMPLAINT: SCAD evaluation and management    SCAD HISTORY  Age at diagnosis: 44  Extent of disease: SCAD, left sided rui-diverticular inflammation  Current SCAD medications: lialda 4.8g + rowasa  ( previously on Lialda 4.8 G daily, self discontinued in March 2023)  Prior SCAD Medications: Cipro/flagyl (helped)    DISEASE ASSESSMENT  Labs:  Recent Labs   Lab Test 12/14/23  1334 11/29/23  1602 10/09/23  1339 09/18/23  1101 06/19/19  1009 03/18/19  1652 06/20/18  1611   CRP  --   --   --   --  <2.9  --  0.6   SED  --  10  --  15 9  --  20   HGB 14.3 14.3   < > 15.6  --    < >  --     < > = values in this interval not displayed.     Endoscopic assessment:   Colonoscopy 12/20/2023 with congested erythematous eroded friable mucosa in the sigmoid and distal rectum.  Biopsies show moderate active inflammation.    8/2019 icscope showed Dee 2 colitis from 30 cm-40cm. Diverticulosis in the sigmoid. Rest of colon was normal.     PATH:  SPECIMEN(S):   A: Cecal biopsy   B: Colon biopsy, ascending   C: Colon biopsy, transverse   D: Colon biopsy, descending   E: Sigmoid colon biopsy   F: Rectal biopsy     FINAL DIAGNOSIS:   A. Cecum, biopsy:   Colonic mucosa with no pathologic abnormalities     B. Ascending Colon, Biopsy:   Colonic mucosa with no pathologic abnormalities     C. Transverse Colon, biopsy:   Colonic mucosa with no pathologic abnormalities     D. Descending Colon, biopsy:   Colonic mucosa with no pathologic abnormalities     E. Sigmoid Colon, biopsy:   Colitis with crypt injury, moderately active, with rectum sparing (see   part F below); consistent with SCAD   (segmental  colitisassociated diverticulosis); negative for dysplasia     F. Rectum, biopsy:   Rectal mucosa with no pathologic abnormalities     EGD 4/15/19 Normal, normal biopsies of esophagus, stomach and duodenum  3/25/19 colonoscopy for rectal bleeding: left sided diverticular with edema, erythema, loss of vascular pattern noted in 2 segments (40-36 cm and 30-22 cm from the anus.    CT ab/pelvis 3/5/19:  Renal hypodensities stable from 4/16/19 exam, 2 mm calculus in right kidney, no gastric or small bowel abnormality  Fecal calprotectin: pending  C diff: negative per OSH records    ASSESSMENT/PLAN  Mrs. Salter is a 48 year old woman with history of cholecystectomy, 3 uncomplicated vaginal deliveries, thyroid cysts (per patient), and segmental colitis associated with diverticular disease who is following up for management of her disease.  She is on SCD diet.     Shortly after her last visit she had an uptick of symptoms with urgent bloody stools.  Fecal calprotectin greater than 3000.  Flex sig showed moderate active inflammation in the rectosigmoid.  She responded well to a short course of prednisone and restarting her Lialda in addition to continuation of Rowasa enemas.    Current medication: Lialda 4.8 g a day and Rowasa enemas nightly  Last endoscopic disease activity: Colonoscopy 12/20/2023 with congested erythematous eroded friable mucosa in the sigmoid and distal rectum.  Biopsies show moderate active inflammation.    We will proceed with the following plan:     Plan:  --- Continue Lialda 4.8 g/day   --- Continue rowasa enemas  --- Fecal calprotectin to be obtained this summer to trend.  If normal we will continue with the above regimen, if persistently elevated we will discuss alternative therapies, likely biologic  --- Labs CBC, LFTs, CRP, ESR + creatinine this fall    Return to clinic in 6 months    Thank you for this consultation.  30 minutes spent on the date of the encounter doing chart review, history and  "exam, documentation and further activities as noted above.  It was a pleasure to participate in the care of this patient; please contact us with any further questions.      Gerhard Nicolas PA-C  Division of Gastroenterology, Hepatology and Nutrition  [facility]      HPI:   Mrs. Salter is a 48 year old woman with history of cholecystectomy, 3 uncomplicated vaginal deliveries, thyroid cysts (per patient), and recent diagnosis of segmental colitis associated with diverticular disease who is establishing care for management of her disease.    Per chart:  Patient seen about 2014 at [facility] for diverticulitis and last seen 5/2019 for follow up of SCAD and nausea.  Patient had colonoscopy 3/2019 with finding of edema, erythema, loss of and loss of vascular patter in 2 segments of left colon with mild to moderate diverticulosis.  Sigmoid biopsies showed mildly active chronic colitis consistent with SCAD.  Patient was treated with 10 day course of cipro/flagyl with improvement in symptoms followed by Lialda 4.8 G daily with possible improvement. On 4/11/19 the patient was found to be c.diff negative.      At time of consultation, patient reported frustation with lack of symptom improvement and communication at prior GI providers office. She presented here.  Patient reports 5 years of alternating diarrhea and constipation along with occassional undigested food in stool and borrygymi.  She reports had initial improvement in symptoms following cipro/flagyl and mesalamine but called in as still was having blood streaked stools for which she was started on rowasa enemas.  She is concerned she is still flaring despite the therapies and putting herself on a low residue \"specific carbohydrate diet\" supplemented with \"high protein predigested cancer shakes\".  She reports weight loss in the setting of biking 12 miles daily and changed diet.    At time of consultation the patient reports two semiformed stools daily with blood " streaks in all stools (10% blood) and with wiping.  She notes one day a week she will have bloating, increased flatus, general malaise, 5-6 watery BM's along with no associated abdominal pain.      Patient also reports 10 years of chronic food getting stuck in her esophagus until drinks some water with the food.  It doesn't happen with draper, but rather typically with meats.  She has never had a food impaction.  Happens with every meal.  No odynophagia, no nausea currently, no vomiting, rare reflux/heartburn. PPI not helpful in the past.  Reports was having nausea but improved with simple carbohydrate diet.  Reports when was nauseated, it was typically in the morning and improved with a protein breakfast sandwich.    Prior report of dysphagia and nausea,  Had upper endoscopy 2019, 1 gastric polyp (fundic gland).  Separate mid/distal esophageal, gastric and duodenal biopsies negative.    Patient notes Hx of thyroid cysts and borderline TSH levels.       Social hx   -no smoking  -1-2 x month nsaids  -rare alcohol   -no illicit  -Bike 12 miles a day, weights    Fam Hx  Mother with history colitis - no issues in years, colon polyps  Maternal grandfather  of colon cancer at age 76  Both parents have diverticulitis    Interval history, 2019  In August, started cipro x 2 weeks, flagyl x 2 weeks, lialda and prednisone. Notices increased pain when delays Lialda by a few hours (increased pain). Now down to prednisone 10 mg daily, going to 5 mg daily on Friday. Feels much improved. Blood has stopped; left sided pain has improved. Gained 5 lb since August. Continues on SCD; met with Rebekah Bronwe.    No upcoming travel plans.     Interval history, 2019  Continues on SCD, which has helped a lot.     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   Off prednisone x 3 weeks. Continues on Cortenema daily x 2 months.   Also on Lialda 4.8 g daily.     Having 2 BMs per day (non-bloody, well-formed). No  blood in stool since August.   Gained a few lb since Sep.     Interval history, 4/2020 (virtual visit)  Got sick in Dec with wheezing and was dx with walking PNA . Treated with abx and then prednisone x 5 days.   Continues on 5 mg prednisone daily and has difficulty weaning off it due to recurrent abd pain. Has been on prednisone 5 mg x 1 month.     Having 2 BMs per day, non bloody, loose.     For SCAD, currently on Lialda 4.8g and Rowasa enemas.     Interval history, 12/2020 (virtual visit)  In beginning of December, had a flare in the setting of stress, with increased bowel frequency and LLQ abd pain. Started on Rowasa enemas with significant help. But around 12/25 ate a piece of cake and developed postprandial nausea and epigastric discomfort.      Continues on Lialda 4.8g daily and daily Rowasa enemas.     Currently having 2 BMs per day, soft. No blood.     EGD in 4/2019 was normal.     Interval history, 3/24/22  She has continued with SCD (since Aug 2019) and feels that symptoms are quite stable. She continues with lialda 4.8g daily and rowasa enemas every other night. Currently having 2 stools per day, formed no blood. No abdominal pain. If she deviates from her diet, then she may have some deviation from her baseline, but overall very stable.     She has bad migraines and is currently has one today.    Interval hx 10/26/22  Symptoms over the last 6 months. 1-3 stools per day, slightly looser than normal, variable consistency from more firm to looser stools.  No blood in the stool.  No urgency or nighttime stool.  She has been consistent and compliant with lialda and rowasa. Over the last 6 months she has had discomfort in lower left quadrant. Very tired, minimal appetite. Trying to minimize grains and sugar.    A lot of stress with father in law passing and coworker passing suddenly. Submitted message through portal to get rx for steroids and antibiotics which has worked well in the past.     Interval hx  3/29/23  Cipro/flagyl allowed for improvement (rifaximin denied). Has been quite stable other than 2 weeks at end of Feb 2023. Bowel pattern fluctuates with what she perceives to be her stress level. Avg 2 BM daily that are formed. No blood in the stool. She continues with lialda 4.8g daily and rowasa daily.    Interval hx 9/12/23  Earlier last week began having abdominal discomfort in lower abdomen.  Over the weekend had moderate to severe pain across abdomen. Had chills, but did not take temp.Only doing rowasa enemas, self d/c'd lialda March 2023 bc due for blood work but ultimately did not complete. Rested and hydrated and seems to be improving. LLQ pain is still there and slightly worse than normal but improving. No fever or chills.    Interval hx 11/14/23  She continues with rowasa enemas (Holding lialda for now). Bowel pattern is stable, no urgency. No urgency, blood in the stool or nighttime stools. No fevers or chills.   Continues on SCD.  Busy with school, computer science degree.     Interval hx 1/17/24  Had a flare shortly after last visit prompting a flexible sigmoidoscopy showing moderate active inflammation in the rectosigmoid requiring prednisone.  She was able to successfully taper off prednisone and continue with Lialda and Rowasa enemas.  Currently having 2 stools per day, soft serve. No blood in the stool for the last 2 weeks. Off prednisone now. Consistent with Rowasa enemas and Lialda daily. No abdominal pain. No fevers.     ROS:    No fevers or chills  No weight loss  No blurry vision, double vision or change in vision  No sore throat  No lymphadenopathy  No shortness of breath or wheezing  No chest pain or pressure  No arthralgias or myalgias  No rashes or skin changes  No dysphagia  No BRBPR, hematochezia  No dysuria, frequency or urgency  No hot/cold intolerance or polyria  No anxiety or depression    Extra intestinal manifestations of IBD:  No uveitis/episcleritis  No aphthous ulcers   No  arthritis   No erythema nodosum/pyoderma gangrenosum.     PERTINENT PAST MEDICAL HISTORY:  None     PREVIOUS SURGERIES:  Past Surgical History:   Procedure Laterality Date    CHOLECYSTECTOMY      COLONOSCOPY      COLONOSCOPY N/A 8/12/2019    Procedure: COLONOSCOPY, WITH POLYPECTOMY AND BIOPSY;  Surgeon: Yolanda Molina MD;  Location: [facility] OR    HC REMOVAL GALLBLADDER      Description: Cholecystectomy;  Recorded: 04/06/2011;     Surgical hx  -Cholecystectomy 2010  -3 children - vaginal x 3, Bladder sling placed 2016    PREVIOUS ENDOSCOPY:  See above    ALLERGIES:     Allergies   Allergen Reactions    Sulfa Antibiotics Hives     Other reaction(s): *Unknown       PERTINENT MEDICATIONS:    Current Outpatient Medications:     albuterol (PROAIR HFA/PROVENTIL HFA/VENTOLIN HFA) 108 (90 Base) MCG/ACT inhaler, Inhale 2 puffs into the lungs every 4 hours as needed for shortness of breath, wheezing or cough, Disp: 18 g, Rfl: 0    amitriptyline (ELAVIL) 10 MG tablet, Take 10 mg by mouth, Disp: , Rfl:     butalbital-acetaminophen-caffeine (FIORICET/ESGIC) -40 MG tablet, Take 1 tablet by mouth, Disp: , Rfl:     hydrocortisone (CORTENEMA) 100 MG/60ML enema, Place 1 enema rectally at bedtime, Disp: 1800 mL, Rfl: 0    mesalamine (LIALDA) 1.2 g DR tablet, Take 4 tablets (4,800 mg) by mouth daily, Disp: 360 tablet, Rfl: 3    mesalamine (ROWASA) 4 g enema, Place 1 enema (4 g) rectally At Bedtime, Disp: 5400 mL, Rfl: 1    Multiple Vitamins-Iron (MULTIVITAMIN/IRON PO), Take 1 tablet by mouth, Disp: , Rfl:     ACIDOPHILUS LACTOBACILLUS PO, Take 1 capsule by mouth (Patient not taking: Reported on 3/29/2023), Disp: , Rfl:     albuterol (PROAIR HFA/PROVENTIL HFA/VENTOLIN HFA) 108 (90 Base) MCG/ACT inhaler, , Disp: , Rfl:     omeprazole (PRILOSEC) 40 MG DR capsule, Take 1 capsule (40 mg) by mouth daily (Patient not taking: Reported on 10/26/2022), Disp: 90 capsule, Rfl: 1    SOCIAL HISTORY:  Social History     Socioeconomic History     "Marital status:      Spouse name: Not on file    Number of children: Not on file    Years of education: Not on file    Highest education level: Not on file   Occupational History    Not on file   Tobacco Use    Smoking status: Never    Smokeless tobacco: Never   Substance and Sexual Activity    Alcohol use: Yes     Comment: occasional    Drug use: Never    Sexual activity: Not on file   Other Topics Concern    Parent/sibling w/ CABG, MI or angioplasty before 65F 55M? Not Asked   Social History Narrative    Not on file     Social Determinants of Health     Financial Resource Strain: Not on file   Food Insecurity: Not on file   Transportation Needs: Not on file   Physical Activity: Not on file   Stress: Not on file   Social Connections: Not on file   Interpersonal Safety: Not on file   Housing Stability: Not on file     FAMILY HISTORY:  Family History   Problem Relation Age of Onset    Diverticulitis Father     Inflammatory Bowel Disease No family hx of     Colon Cancer No family hx of     Coronary Artery Disease Father     Cerebrovascular Disease Maternal Grandfather     Cerebrovascular Disease Paternal Grandfather        PHYSICAL EXAMINATION:  Constitutional: aaox3, cooperative, pleasant, not dyspneic/diaphoretic, no acute distress  Vitals reviewed: /71   Ht 1.702 m (5' 7\")   Wt 61.7 kg (136 lb)   SpO2 95%   BMI 21.30 kg/m    Wt:   Wt Readings from Last 2 Encounters:   01/17/24 61.7 kg (136 lb)   12/20/23 56.7 kg (125 lb)      Constitutional - general appearance is well and in no acute distress. Body habitus normal  Eyes - No redness or discharge  Respiratory - No cough, unlabored breathing  Musculoskeletal - range of motion intact: Neck and arms  Skin - No discoloration or lesions  Neurological - No tremors, headaches  Psychiatric - No anxiety, alert & oriented    PERTINENT STUDIES:    Most recent hepatic panel:  Recent Labs   Lab Test 11/29/23  1602 09/18/23  1101   ALT 14 12   AST 15 17     Most " recent creatinine:  Recent Labs   Lab Test 10/09/23  1339 09/18/23  1101   CR 0.60 0.81           Again, thank you for allowing me to participate in the care of your patient.      Sincerely,    Gerhard Nicolas PA-C

## 2024-01-18 ENCOUNTER — TELEPHONE (OUTPATIENT)
Dept: GASTROENTEROLOGY | Facility: CLINIC | Age: 49
End: 2024-01-18
Payer: COMMERCIAL

## 2024-01-18 NOTE — TELEPHONE ENCOUNTER
Left Voicemail (1st Attempt) and Sent Mychart (1st Attempt) for the patient to call back and schedule the following:    Appointment type: return IBD  Provider: Gerhard Nicolas PA-C  Return date: 07/17/24  Specialty phone number: [phone]  Additional appointment(s) needed: N/A  Additonal Notes: N/A

## 2024-01-23 ENCOUNTER — LAB REQUISITION (OUTPATIENT)
Dept: LAB | Facility: CLINIC | Age: 49
End: 2024-01-23

## 2024-01-23 DIAGNOSIS — Z12.4 ENCOUNTER FOR SCREENING FOR MALIGNANT NEOPLASM OF CERVIX: ICD-10-CM

## 2024-01-23 DIAGNOSIS — Z13.6 ENCOUNTER FOR SCREENING FOR CARDIOVASCULAR DISORDERS: ICD-10-CM

## 2024-01-23 PROCEDURE — 80061 LIPID PANEL: CPT | Performed by: PHYSICIAN ASSISTANT

## 2024-01-23 PROCEDURE — G0124 SCREEN C/V THIN LAYER BY MD: HCPCS | Performed by: PATHOLOGY

## 2024-01-23 PROCEDURE — 87624 HPV HI-RISK TYP POOLED RSLT: CPT | Performed by: PHYSICIAN ASSISTANT

## 2024-01-23 PROCEDURE — G0145 SCR C/V CYTO,THINLAYER,RESCR: HCPCS | Performed by: PHYSICIAN ASSISTANT

## 2024-01-24 LAB
CHOLEST SERPL-MCNC: 254 MG/DL
FASTING STATUS PATIENT QL REPORTED: ABNORMAL
HDLC SERPL-MCNC: 61 MG/DL
LDLC SERPL CALC-MCNC: 179 MG/DL
NONHDLC SERPL-MCNC: 193 MG/DL
TRIGL SERPL-MCNC: 69 MG/DL

## 2024-01-29 LAB
BKR LAB AP GYN ADEQUACY: ABNORMAL
BKR LAB AP GYN INTERPRETATION: ABNORMAL
BKR LAB AP GYN OTHER FINDINGS: ABNORMAL
BKR LAB AP HPV REFLEX: ABNORMAL
BKR LAB AP PREVIOUS ABNORMAL: ABNORMAL
PATH REPORT.COMMENTS IMP SPEC: ABNORMAL
PATH REPORT.COMMENTS IMP SPEC: ABNORMAL
PATH REPORT.RELEVANT HX SPEC: ABNORMAL

## 2024-01-30 LAB
HUMAN PAPILLOMA VIRUS 16 DNA: NEGATIVE
HUMAN PAPILLOMA VIRUS 18 DNA: NEGATIVE
HUMAN PAPILLOMA VIRUS FINAL DIAGNOSIS: NORMAL
HUMAN PAPILLOMA VIRUS OTHER HR: NEGATIVE

## 2024-03-01 DIAGNOSIS — K50.10 SEGMENTAL COLITIS ASSOCIATED WITH DIVERTICULOSIS (H): ICD-10-CM

## 2024-03-01 DIAGNOSIS — K57.30 SEGMENTAL COLITIS ASSOCIATED WITH DIVERTICULOSIS (H): ICD-10-CM

## 2024-03-07 NOTE — TELEPHONE ENCOUNTER
mesalamine (ROWASA) 4 g enema 5400 mL 1 3/29/2023     Last Office Visit : 1/17/24  Future Office visit:  5/14/24    Routing refill request to provider for review/approval because:  Does not pass protocol  Aminosalicylate Agents Failed      Medication idicated for associated diagnosis    Medication is associated with one or more of the following diagnoses:  Ulcerative colitis  Proctosigmoiditis  Ulcerative proctitisl

## 2024-03-08 RX ORDER — MESALAMINE 4 G/60ML
4 SUSPENSION RECTAL AT BEDTIME
Refills: 3 | OUTPATIENT
Start: 2024-03-08

## 2024-03-25 ENCOUNTER — TELEPHONE (OUTPATIENT)
Dept: GASTROENTEROLOGY | Facility: CLINIC | Age: 49
End: 2024-03-25
Payer: COMMERCIAL

## 2024-03-25 DIAGNOSIS — K50.10 SEGMENTAL COLITIS ASSOCIATED WITH DIVERTICULOSIS (H): ICD-10-CM

## 2024-03-25 DIAGNOSIS — K57.30 SEGMENTAL COLITIS ASSOCIATED WITH DIVERTICULOSIS (H): ICD-10-CM

## 2024-03-25 NOTE — TELEPHONE ENCOUNTER
[facility] Call Center    Phone Message    May a detailed message be left on voicemail: yes     Reason for Call: Medication Question or concern regarding medication   Prescription Clarification  Name of Medication: hydrocortisone (CORTENEMA) 100 MG/60ML enema   Prescribing Provider: Gerhard Nicolas    Pharmacy: [facility]    What on the order needs clarification? The pharmacy is needing the the Rx changed as this medication is only filled in 7 day supplies. Please advise. Thank you!      Action Taken: Message routed to:  [facility] ([facility] GI    Travel Screening: Not Applicable

## 2024-03-26 NOTE — TELEPHONE ENCOUNTER
Order discontinued. Patient continues on Rowasa enemas for maintenance medications. Patient will reach out to clinic if Cortenemas are needed PRN for flare.

## 2024-03-28 RX ORDER — MESALAMINE 4 G/60ML
4 SUSPENSION RECTAL AT BEDTIME
Refills: 3 | OUTPATIENT
Start: 2024-03-28

## 2024-05-14 ENCOUNTER — VIRTUAL VISIT (OUTPATIENT)
Dept: GASTROENTEROLOGY | Facility: CLINIC | Age: 49
End: 2024-05-14
Payer: COMMERCIAL

## 2024-05-14 VITALS — BODY MASS INDEX: 20.4 KG/M2 | HEIGHT: 67 IN | WEIGHT: 130 LBS

## 2024-05-14 DIAGNOSIS — K57.30 SEGMENTAL COLITIS ASSOCIATED WITH DIVERTICULOSIS (H): ICD-10-CM

## 2024-05-14 DIAGNOSIS — K51.30 ULCERATIVE RECTOSIGMOIDITIS WITHOUT COMPLICATION (H): Primary | ICD-10-CM

## 2024-05-14 DIAGNOSIS — K50.10 SEGMENTAL COLITIS ASSOCIATED WITH DIVERTICULOSIS (H): ICD-10-CM

## 2024-05-14 PROCEDURE — 99214 OFFICE O/P EST MOD 30 MIN: CPT | Mod: 95 | Performed by: PHYSICIAN ASSISTANT

## 2024-05-14 RX ORDER — MESALAMINE 1.2 G/1
4800 TABLET, DELAYED RELEASE ORAL DAILY
Qty: 360 TABLET | Refills: 3 | Status: SHIPPED | OUTPATIENT
Start: 2024-05-14

## 2024-05-14 ASSESSMENT — PAIN SCALES - GENERAL: PAINLEVEL: NO PAIN (0)

## 2024-05-14 NOTE — LETTER
5/14/2024         RE: Velma Mcfarlane  [address] [address]        Dear Colleague,    Thank you for referring your patient, Velma Mcfarlane, to the [facility] [place]. Please see a copy of my visit note below.      IBD CLINIC VISIT -- follow up    CHIEF COMPLAINT: SCAD evaluation and management    SCAD HISTORY  Age at diagnosis: 44  Extent of disease: SCAD, left sided rui-diverticular inflammation  Current SCAD medications: lialda 4.8g + rowasa  ( previously on Lialda 4.8 G daily, self discontinued in March 2023)  Prior SCAD Medications: Cipro/flagyl (helped)    DISEASE ASSESSMENT  Labs:  Recent Labs   Lab Test 12/14/23  1334 11/29/23  1602 10/09/23  1339 09/18/23  1101 06/19/19  1009 03/18/19  1652 06/20/18  1611   CRP  --   --   --   --  <2.9  --  0.6   SED  --  10  --  15 9  --  20   HGB 14.3 14.3   < > 15.6  --    < >  --     < > = values in this interval not displayed.     Endoscopic assessment:   Colonoscopy 12/20/2023 with congested erythematous eroded friable mucosa in the sigmoid and distal rectum.  Biopsies show moderate active inflammation.    8/2019 icscope showed Dee 2 colitis from 30 cm-40cm. Diverticulosis in the sigmoid. Rest of colon was normal.     PATH:  SPECIMEN(S):   A: Cecal biopsy   B: Colon biopsy, ascending   C: Colon biopsy, transverse   D: Colon biopsy, descending   E: Sigmoid colon biopsy   F: Rectal biopsy     FINAL DIAGNOSIS:   A. Cecum, biopsy:   Colonic mucosa with no pathologic abnormalities     B. Ascending Colon, Biopsy:   Colonic mucosa with no pathologic abnormalities     C. Transverse Colon, biopsy:   Colonic mucosa with no pathologic abnormalities     D. Descending Colon, biopsy:   Colonic mucosa with no pathologic abnormalities     E. Sigmoid Colon, biopsy:   Colitis with crypt injury, moderately active, with rectum sparing (see   part F below); consistent with SCAD   (segmental  colitisassociated diverticulosis); negative for dysplasia     F. Rectum, biopsy:   Rectal mucosa with no pathologic abnormalities     EGD 4/15/19 Normal, normal biopsies of esophagus, stomach and duodenum  3/25/19 colonoscopy for rectal bleeding: left sided diverticular with edema, erythema, loss of vascular pattern noted in 2 segments (40-36 cm and 30-22 cm from the anus.    CT ab/pelvis 3/5/19:  Renal hypodensities stable from 4/16/19 exam, 2 mm calculus in right kidney, no gastric or small bowel abnormality  Fecal calprotectin: pending  C diff: negative per OSH records    ASSESSMENT/PLAN  Mrs. Salter is a 49 year old woman with history of cholecystectomy, 3 uncomplicated vaginal deliveries, thyroid cysts (per patient), and segmental colitis associated with diverticular disease who is following up for management of her disease.  She is on SCD diet.     Flare Dec 2023 with urgent bloody stools.  Fecal calprotectin greater than 3000.  Flex sig showed moderate active inflammation in the rectosigmoid.  She responded well to a short course of prednisone and restarting her Lialda in addition to continuation of Rowasa enemas.    Current medication: Lialda 4.8 g a day and Rowasa enemas nightly  Last endoscopic disease activity: Colonoscopy 12/20/2023 with congested erythematous eroded friable mucosa in the sigmoid and distal rectum.  Biopsies show moderate active inflammation.    We will proceed with the following plan:     Plan:  --- Continue Lialda 4.8 g/day   --- Continue rowasa enemas  --- Fecal calprotectin to be obtained this summer to trend.  If normal we will continue with the above regimen, if persistently elevated we will discuss alternative therapies, likely biologic  --- Labs CBC, LFTs, CRP, ESR + creatinine this fall    Return to clinic in 6 months    Thank you for this consultation.  It was a pleasure to participate in the care of this patient; please contact us with any further questions.      Gerhard Nicolas  "SUJATHA  Division of Gastroenterology, Hepatology and Nutrition  [facility]      HPI:   Mrs. Salter is a 49 year old woman with history of cholecystectomy, 3 uncomplicated vaginal deliveries, thyroid cysts (per patient), and recent diagnosis of segmental colitis associated with diverticular disease who is establishing care for management of her disease.    Per chart:  Patient seen about 2014 at [facility] for diverticulitis and last seen 5/2019 for follow up of SCAD and nausea.  Patient had colonoscopy 3/2019 with finding of edema, erythema, loss of and loss of vascular patter in 2 segments of left colon with mild to moderate diverticulosis.  Sigmoid biopsies showed mildly active chronic colitis consistent with SCAD.  Patient was treated with 10 day course of cipro/flagyl with improvement in symptoms followed by Lialda 4.8 G daily with possible improvement. On 4/11/19 the patient was found to be c.diff negative.      At time of consultation, patient reported frustation with lack of symptom improvement and communication at prior GI providers office. She presented here.  Patient reports 5 years of alternating diarrhea and constipation along with occassional undigested food in stool and borrygymi.  She reports had initial improvement in symptoms following cipro/flagyl and mesalamine but called in as still was having blood streaked stools for which she was started on rowasa enemas.  She is concerned she is still flaring despite the therapies and putting herself on a low residue \"specific carbohydrate diet\" supplemented with \"high protein predigested cancer shakes\".  She reports weight loss in the setting of biking 12 miles daily and changed diet.    At time of consultation the patient reports two semiformed stools daily with blood streaks in all stools (10% blood) and with wiping.  She notes one day a week she will have bloating, increased flatus, general malaise, 5-6 watery BM's along with no associated abdominal " pain.      Patient also reports 10 years of chronic food getting stuck in her esophagus until drinks some water with the food.  It doesn't happen with draper, but rather typically with meats.  She has never had a food impaction.  Happens with every meal.  No odynophagia, no nausea currently, no vomiting, rare reflux/heartburn. PPI not helpful in the past.  Reports was having nausea but improved with simple carbohydrate diet.  Reports when was nauseated, it was typically in the morning and improved with a protein breakfast sandwich.    Prior report of dysphagia and nausea,  Had upper endoscopy 2019, 1 gastric polyp (fundic gland).  Separate mid/distal esophageal, gastric and duodenal biopsies negative.    Patient notes Hx of thyroid cysts and borderline TSH levels.       Social hx   -no smoking  -1-2 x month nsaids  -rare alcohol   -no illicit  -Bike 12 miles a day, weights    Fam Hx  Mother with history colitis - no issues in years, colon polyps  Maternal grandfather  of colon cancer at age 76  Both parents have diverticulitis    Interval history, 2019  In August, started cipro x 2 weeks, flagyl x 2 weeks, lialda and prednisone. Notices increased pain when delays Lialda by a few hours (increased pain). Now down to prednisone 10 mg daily, going to 5 mg daily on Friday. Feels much improved. Blood has stopped; left sided pain has improved. Gained 5 lb since August. Continues on SCD; met with Rebekah Browne.    No upcoming travel plans.     Interval history, 2019  Continues on SCD, which has helped a lot.     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   Off prednisone x 3 weeks. Continues on Cortenema daily x 2 months.   Also on Lialda 4.8 g daily.     Having 2 BMs per day (non-bloody, well-formed). No blood in stool since August.   Gained a few lb since Sep.     Interval history, 2020 (virtual visit)  Got sick in Dec with wheezing and was dx with walking PNA . Treated with abx and  then prednisone x 5 days.   Continues on 5 mg prednisone daily and has difficulty weaning off it due to recurrent abd pain. Has been on prednisone 5 mg x 1 month.     Having 2 BMs per day, non bloody, loose.     For SCAD, currently on Lialda 4.8g and Rowasa enemas.     Interval history, 12/2020 (virtual visit)  In beginning of December, had a flare in the setting of stress, with increased bowel frequency and LLQ abd pain. Started on Rowasa enemas with significant help. But around 12/25 ate a piece of cake and developed postprandial nausea and epigastric discomfort.      Continues on Lialda 4.8g daily and daily Rowasa enemas.     Currently having 2 BMs per day, soft. No blood.     EGD in 4/2019 was normal.     Interval history, 3/24/22  She has continued with SCD (since Aug 2019) and feels that symptoms are quite stable. She continues with lialda 4.8g daily and rowasa enemas every other night. Currently having 2 stools per day, formed no blood. No abdominal pain. If she deviates from her diet, then she may have some deviation from her baseline, but overall very stable.     She has bad migraines and is currently has one today.    Interval hx 10/26/22  Symptoms over the last 6 months. 1-3 stools per day, slightly looser than normal, variable consistency from more firm to looser stools.  No blood in the stool.  No urgency or nighttime stool.  She has been consistent and compliant with lialda and rowasa. Over the last 6 months she has had discomfort in lower left quadrant. Very tired, minimal appetite. Trying to minimize grains and sugar.    A lot of stress with father in law passing and coworker passing suddenly. Submitted message through portal to get rx for steroids and antibiotics which has worked well in the past.     Interval hx 3/29/23  Cipro/flagyl allowed for improvement (rifaximin denied). Has been quite stable other than 2 weeks at end of Feb 2023. Bowel pattern fluctuates with what she perceives to be her  stress level. Avg 2 BM daily that are formed. No blood in the stool. She continues with lialda 4.8g daily and rowasa daily.    Interval hx 9/12/23  Earlier last week began having abdominal discomfort in lower abdomen.  Over the weekend had moderate to severe pain across abdomen. Had chills, but did not take temp.Only doing rowasa enemas, self d/c'd lialda March 2023 bc due for blood work but ultimately did not complete. Rested and hydrated and seems to be improving. LLQ pain is still there and slightly worse than normal but improving. No fever or chills.    Interval hx 11/14/23  She continues with rowasa enemas (Holding lialda for now). Bowel pattern is stable, no urgency. No urgency, blood in the stool or nighttime stools. No fevers or chills.   Continues on SCD.  Busy with school, computer science degree.     Interval hx 1/17/24  Had a flare shortly after last visit prompting a flexible sigmoidoscopy showing moderate active inflammation in the rectosigmoid requiring prednisone.  She was able to successfully taper off prednisone and continue with Lialda and Rowasa enemas.  Currently having 2 stools per day, soft serve. No blood in the stool for the last 2 weeks. Off prednisone now. Consistent with Rowasa enemas and Lialda daily. No abdominal pain. No fevers.     Interval hx 5/14/24  Had an uptick of symptoms around finals, but now feeling better. She is currently having 2 stools per day, formed and no blood (soft serve), no urgency or nighttime stools. Continues with rowasa enemas at night and consistent with lialda 4.8 g daily.    ROS:    No fevers or chills  No weight loss  No blurry vision, double vision or change in vision  No sore throat  No lymphadenopathy  No shortness of breath or wheezing  No chest pain or pressure  No arthralgias or myalgias  No rashes or skin changes  No dysphagia  No BRBPR, hematochezia  No dysuria, frequency or urgency  No hot/cold intolerance or polyria  No anxiety or  depression    Extra intestinal manifestations of IBD:  No uveitis/episcleritis  No aphthous ulcers   No arthritis   No erythema nodosum/pyoderma gangrenosum.     PERTINENT PAST MEDICAL HISTORY:  None     PREVIOUS SURGERIES:  Past Surgical History:   Procedure Laterality Date    CHOLECYSTECTOMY      COLONOSCOPY      COLONOSCOPY N/A 8/12/2019    Procedure: COLONOSCOPY, WITH POLYPECTOMY AND BIOPSY;  Surgeon: Yolanda Molina MD;  Location: [facility] OR    HC REMOVAL GALLBLADDER      Description: Cholecystectomy;  Recorded: 04/06/2011;     Surgical hx  -Cholecystectomy 2010  -3 children - vaginal x 3, Bladder sling placed 2016    PREVIOUS ENDOSCOPY:  See above    ALLERGIES:     Allergies   Allergen Reactions    Sulfa Antibiotics Hives     Other reaction(s): *Unknown       PERTINENT MEDICATIONS:    Current Outpatient Medications:     ACIDOPHILUS LACTOBACILLUS PO, Take 1 capsule by mouth (Patient not taking: Reported on 3/29/2023), Disp: , Rfl:     albuterol (PROAIR HFA/PROVENTIL HFA/VENTOLIN HFA) 108 (90 Base) MCG/ACT inhaler, Inhale 2 puffs into the lungs every 4 hours as needed for shortness of breath, wheezing or cough, Disp: 18 g, Rfl: 0    amitriptyline (ELAVIL) 10 MG tablet, Take 10 mg by mouth, Disp: , Rfl:     butalbital-acetaminophen-caffeine (FIORICET/ESGIC) -40 MG tablet, Take 1 tablet by mouth, Disp: , Rfl:     mesalamine (LIALDA) 1.2 g DR tablet, Take 4 tablets (4,800 mg) by mouth daily, Disp: 360 tablet, Rfl: 3    mesalamine (ROWASA) 4 g enema, Place 1 enema (4 g) rectally at bedtime, Disp: 1800 mL, Rfl: 11    Multiple Vitamins-Iron (MULTIVITAMIN/IRON PO), Take 1 tablet by mouth, Disp: , Rfl:     SOCIAL HISTORY:  Social History     Socioeconomic History    Marital status:      Spouse name: Not on file    Number of children: Not on file    Years of education: Not on file    Highest education level: Not on file   Occupational History    Not on file   Tobacco Use    Smoking status: Never    Smokeless  "tobacco: Never   Substance and Sexual Activity    Alcohol use: Yes     Comment: occasional    Drug use: Never    Sexual activity: Not on file   Other Topics Concern    Parent/sibling w/ CABG, MI or angioplasty before 65F 55M? Not Asked   Social History Narrative    Not on file     Social Determinants of Health     Financial Resource Strain: Not on file   Food Insecurity: Not on file   Transportation Needs: Not on file   Physical Activity: Not on file   Stress: Not on file   Social Connections: Not on file   Interpersonal Safety: Not on file   Housing Stability: Not on file     FAMILY HISTORY:  Family History   Problem Relation Age of Onset    Diverticulitis Father     Inflammatory Bowel Disease No family hx of     Colon Cancer No family hx of     Coronary Artery Disease Father     Cerebrovascular Disease Maternal Grandfather     Cerebrovascular Disease Paternal Grandfather        PHYSICAL EXAMINATION:  Constitutional: aaox3, cooperative, pleasant, not dyspneic/diaphoretic, no acute distress  Vitals reviewed: Ht 1.702 m (5' 7\")   Wt 59 kg (130 lb)   BMI 20.36 kg/m    Wt:   Wt Readings from Last 2 Encounters:   05/14/24 59 kg (130 lb)   01/17/24 61.7 kg (136 lb)      Constitutional - general appearance is well and in no acute distress. Body habitus normal  Eyes - No redness or discharge  Respiratory - No cough, unlabored breathing  Musculoskeletal - range of motion intact: Neck and arms  Skin - No discoloration or lesions  Neurological - No tremors, headaches  Psychiatric - No anxiety, alert & oriented    PERTINENT STUDIES:    Most recent hepatic panel:  Recent Labs   Lab Test 11/29/23  1602 09/18/23  1101   ALT 14 12   AST 15 17     Most recent creatinine:  Recent Labs   Lab Test 10/09/23  1339 09/18/23  1101   CR 0.60 0.81             Again, thank you for allowing me to participate in the care of your patient.      Sincerely,    Gerhard Nicolas PA-C  "

## 2024-05-14 NOTE — PATIENT INSTRUCTIONS
It was a pleasure taking care of you today.  I've included a brief summary of our discussion and care plan from today's visit below.  Please review this information with your primary care provider.  ______________________________________________________________________    My recommendations are summarized as follows:    -- Continue lialda 4 pills daily and rowasa nightly  -- stool sample this summer  -- stool sample this summer (June-ish)  -- Patient with IBD we recommend supplementation vitamin D 1000 units daily and calcium 500 mg twice daily.  -- No NSAIDs (ibuprofen, or anything containing ibuprofen)    For additional resources about inflammatory bowel disease visit http://www.crohnscolitisfoundation.org/    To learn more about Diet and Nutrition in the setting of IBD, check out some of these resources:  https://www.crohnscolitisfoundation.org/diet-and-nutrition/what-should-i-eat  https://www.nimbal.org/  https://ntforibd.org/    Return to GI Clinic in 3 months to review your progress.    ______________________________________________________________________    How do I schedule labs, imaging studies, or procedures that were ordered in clinic today?     Labs: To schedule lab appointment at the [facility], use my chart or call [phone]. If you have a [facility] lab closer to home where you are regularly seen you can give them a call.     Procedures: If a colonoscopy, upper endoscopy, breath test, esophageal manometry, or pH impedence was ordered today, our endoscopy team will call you to schedule this. If you have not heard from our endoscopy team within a week, please call [phone] to schedule.     Imaging Studies: If you were scheduled for a CT scan, X-ray, MRI, ultrasound, HIDA scan or other imaging study, please call [phone] to have this scheduled.     Referral: If a referral to another specialty was ordered, expect a phone call or follow instructions above. If you have not heard  from anyone regarding your referral in a week, please call our clinic to check the status.     Who do I call with any questions after my visit?  Please be in touch if there are any further questions that arise following today's visit.  There are multiple ways to contact your gastroenterology care team.      During business hours, you may reach a Gastroenterology nurse at [phone]    To schedule or reschedule an appointment, please call [phone].     You can always send a secure message through mygola.  mygola messages are answered by your nurse or doctor typically within 24 hours.  Please allow extra time on weekends and holidays.      For urgent/emergent questions after business hours, you may reach the on-call GI Fellow by contacting the [facility]  at [phone].     How will I get the results of any tests ordered?    You will receive all of your results.  If you have signed up for [employer], any tests ordered at your visit will be available to you after your physician reviews them.  Typically this takes 1-2 weeks.  If there are urgent results that require a change in your care plan, your physician or nurse will call you to discuss the next steps.      What is mygola?  mygola is a secure way for you to access all of your healthcare records from the [facility].  It is a web based computer program, so you can sign on to it from any location.  It also allows you to send secure messages to your care team.  I recommend signing up for mygola access if you have not already done so and are comfortable with using a computer.         Sincerely,    Gerhard Nicolas PA-C  [facility]  Division of Gastroenterology

## 2024-05-14 NOTE — PROGRESS NOTES
Virtual Visit Details    Type of service:  Video Visit     Originating Location (pt. Location): Home    Distant Location (provider location):  Off-site  Platform used for Video Visit: [facility]    IBD CLINIC VISIT -- follow up    CHIEF COMPLAINT: SCAD evaluation and management    SCAD HISTORY  Age at diagnosis: 44  Extent of disease: SCAD, left sided rui-diverticular inflammation  Current SCAD medications: lialda 4.8g + rowasa  ( previously on Lialda 4.8 G daily, self discontinued in March 2023)  Prior SCAD Medications: Cipro/flagyl (helped)    DISEASE ASSESSMENT  Labs:  Recent Labs   Lab Test 12/14/23  1334 11/29/23  1602 10/09/23  1339 09/18/23  1101 06/19/19  1009 03/18/19  1652 06/20/18  1611   CRP  --   --   --   --  <2.9  --  0.6   SED  --  10  --  15 9  --  20   HGB 14.3 14.3   < > 15.6  --    < >  --     < > = values in this interval not displayed.     Endoscopic assessment:   Colonoscopy 12/20/2023 with congested erythematous eroded friable mucosa in the sigmoid and distal rectum.  Biopsies show moderate active inflammation.    8/2019 icscope showed Dee 2 colitis from 30 cm-40cm. Diverticulosis in the sigmoid. Rest of colon was normal.     PATH:  SPECIMEN(S):   A: Cecal biopsy   B: Colon biopsy, ascending   C: Colon biopsy, transverse   D: Colon biopsy, descending   E: Sigmoid colon biopsy   F: Rectal biopsy     FINAL DIAGNOSIS:   A. Cecum, biopsy:   Colonic mucosa with no pathologic abnormalities     B. Ascending Colon, Biopsy:   Colonic mucosa with no pathologic abnormalities     C. Transverse Colon, biopsy:   Colonic mucosa with no pathologic abnormalities     D. Descending Colon, biopsy:   Colonic mucosa with no pathologic abnormalities     E. Sigmoid Colon, biopsy:   Colitis with crypt injury, moderately active, with rectum sparing (see   part F below); consistent with SCAD   (segmental colitisassociated diverticulosis); negative for dysplasia     F. Rectum, biopsy:   Rectal mucosa with no pathologic  abnormalities     EGD 4/15/19 Normal, normal biopsies of esophagus, stomach and duodenum  3/25/19 colonoscopy for rectal bleeding: left sided diverticular with edema, erythema, loss of vascular pattern noted in 2 segments (40-36 cm and 30-22 cm from the anus.    CT ab/pelvis 3/5/19:  Renal hypodensities stable from 4/16/19 exam, 2 mm calculus in right kidney, no gastric or small bowel abnormality  Fecal calprotectin: pending  C diff: negative per OSH records    ASSESSMENT/PLAN  Mrs. Salter is a 49 year old woman with history of cholecystectomy, 3 uncomplicated vaginal deliveries, thyroid cysts (per patient), and segmental colitis associated with diverticular disease who is following up for management of her disease.  She is on SCD diet.     Flare Dec 2023 with urgent bloody stools.  Fecal calprotectin greater than 3000.  Flex sig showed moderate active inflammation in the rectosigmoid.  She responded well to a short course of prednisone and restarting her Lialda in addition to continuation of Rowasa enemas.    Current medication: Lialda 4.8 g a day and Rowasa enemas nightly  Last endoscopic disease activity: Colonoscopy 12/20/2023 with congested erythematous eroded friable mucosa in the sigmoid and distal rectum.  Biopsies show moderate active inflammation.    We will proceed with the following plan:     Plan:  --- Continue Lialda 4.8 g/day   --- Continue rowasa enemas  --- Fecal calprotectin to be obtained this summer to trend.  If normal we will continue with the above regimen, if persistently elevated we will discuss alternative therapies, likely biologic  --- Labs CBC, LFTs, CRP, ESR + creatinine this fall    Return to clinic in 6 months    Thank you for this consultation.  It was a pleasure to participate in the care of this patient; please contact us with any further questions.      Gerhard Nicolas PA-C  Division of Gastroenterology, Hepatology and Nutrition  [facility]      HPI:   Mrs. Salter is a  "49 year old woman with history of cholecystectomy, 3 uncomplicated vaginal deliveries, thyroid cysts (per patient), and recent diagnosis of segmental colitis associated with diverticular disease who is establishing care for management of her disease.    Per chart:  Patient seen about 2014 at [facility] for diverticulitis and last seen 5/2019 for follow up of SCAD and nausea.  Patient had colonoscopy 3/2019 with finding of edema, erythema, loss of and loss of vascular patter in 2 segments of left colon with mild to moderate diverticulosis.  Sigmoid biopsies showed mildly active chronic colitis consistent with SCAD.  Patient was treated with 10 day course of cipro/flagyl with improvement in symptoms followed by Lialda 4.8 G daily with possible improvement. On 4/11/19 the patient was found to be c.diff negative.      At time of consultation, patient reported frustation with lack of symptom improvement and communication at prior GI providers office. She presented here.  Patient reports 5 years of alternating diarrhea and constipation along with occassional undigested food in stool and borrygymi.  She reports had initial improvement in symptoms following cipro/flagyl and mesalamine but called in as still was having blood streaked stools for which she was started on rowasa enemas.  She is concerned she is still flaring despite the therapies and putting herself on a low residue \"specific carbohydrate diet\" supplemented with \"high protein predigested cancer shakes\".  She reports weight loss in the setting of biking 12 miles daily and changed diet.    At time of consultation the patient reports two semiformed stools daily with blood streaks in all stools (10% blood) and with wiping.  She notes one day a week she will have bloating, increased flatus, general malaise, 5-6 watery BM's along with no associated abdominal pain.      Patient also reports 10 years of chronic food getting stuck in her esophagus until drinks some water with " the food.  It doesn't happen with draper, but rather typically with meats.  She has never had a food impaction.  Happens with every meal.  No odynophagia, no nausea currently, no vomiting, rare reflux/heartburn. PPI not helpful in the past.  Reports was having nausea but improved with simple carbohydrate diet.  Reports when was nauseated, it was typically in the morning and improved with a protein breakfast sandwich.    Prior report of dysphagia and nausea,  Had upper endoscopy 2019, 1 gastric polyp (fundic gland).  Separate mid/distal esophageal, gastric and duodenal biopsies negative.    Patient notes Hx of thyroid cysts and borderline TSH levels.       Social hx   -no smoking  -1-2 x month nsaids  -rare alcohol   -no illicit  -Bike 12 miles a day, weights    Fam Hx  Mother with history colitis - no issues in years, colon polyps  Maternal grandfather  of colon cancer at age 76  Both parents have diverticulitis    Interval history, 2019  In August, started cipro x 2 weeks, flagyl x 2 weeks, lialda and prednisone. Notices increased pain when delays Lialda by a few hours (increased pain). Now down to prednisone 10 mg daily, going to 5 mg daily on Friday. Feels much improved. Blood has stopped; left sided pain has improved. Gained 5 lb since August. Continues on SCD; met with Rebekah Browne.    No upcoming travel plans.     Interval history, 2019  Continues on SCD, which has helped a lot.     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   Off prednisone x 3 weeks. Continues on Cortenema daily x 2 months.   Also on Lialda 4.8 g daily.     Having 2 BMs per day (non-bloody, well-formed). No blood in stool since August.   Gained a few lb since Sep.     Interval history, 2020 (virtual visit)  Got sick in Dec with wheezing and was dx with walking PNA . Treated with abx and then prednisone x 5 days.   Continues on 5 mg prednisone daily and has difficulty weaning off it due to recurrent abd  pain. Has been on prednisone 5 mg x 1 month.     Having 2 BMs per day, non bloody, loose.     For SCAD, currently on Lialda 4.8g and Rowasa enemas.     Interval history, 12/2020 (virtual visit)  In beginning of December, had a flare in the setting of stress, with increased bowel frequency and LLQ abd pain. Started on Rowasa enemas with significant help. But around 12/25 ate a piece of cake and developed postprandial nausea and epigastric discomfort.      Continues on Lialda 4.8g daily and daily Rowasa enemas.     Currently having 2 BMs per day, soft. No blood.     EGD in 4/2019 was normal.     Interval history, 3/24/22  She has continued with SCD (since Aug 2019) and feels that symptoms are quite stable. She continues with lialda 4.8g daily and rowasa enemas every other night. Currently having 2 stools per day, formed no blood. No abdominal pain. If she deviates from her diet, then she may have some deviation from her baseline, but overall very stable.     She has bad migraines and is currently has one today.    Interval hx 10/26/22  Symptoms over the last 6 months. 1-3 stools per day, slightly looser than normal, variable consistency from more firm to looser stools.  No blood in the stool.  No urgency or nighttime stool.  She has been consistent and compliant with lialda and rowasa. Over the last 6 months she has had discomfort in lower left quadrant. Very tired, minimal appetite. Trying to minimize grains and sugar.    A lot of stress with father in law passing and coworker passing suddenly. Submitted message through portal to get rx for steroids and antibiotics which has worked well in the past.     Interval hx 3/29/23  Cipro/flagyl allowed for improvement (rifaximin denied). Has been quite stable other than 2 weeks at end of Feb 2023. Bowel pattern fluctuates with what she perceives to be her stress level. Avg 2 BM daily that are formed. No blood in the stool. She continues with lialda 4.8g daily and rowasa  daily.    Interval hx 9/12/23  Earlier last week began having abdominal discomfort in lower abdomen.  Over the weekend had moderate to severe pain across abdomen. Had chills, but did not take temp.Only doing rowasa enemas, self d/c'd lialda March 2023 bc due for blood work but ultimately did not complete. Rested and hydrated and seems to be improving. LLQ pain is still there and slightly worse than normal but improving. No fever or chills.    Interval hx 11/14/23  She continues with rowasa enemas (Holding lialda for now). Bowel pattern is stable, no urgency. No urgency, blood in the stool or nighttime stools. No fevers or chills.   Continues on SCD.  Busy with school, computer science degree.     Interval hx 1/17/24  Had a flare shortly after last visit prompting a flexible sigmoidoscopy showing moderate active inflammation in the rectosigmoid requiring prednisone.  She was able to successfully taper off prednisone and continue with Lialda and Rowasa enemas.  Currently having 2 stools per day, soft serve. No blood in the stool for the last 2 weeks. Off prednisone now. Consistent with Rowasa enemas and Lialda daily. No abdominal pain. No fevers.     Interval hx 5/14/24  Had an uptick of symptoms around finals, but now feeling better. She is currently having 2 stools per day, formed and no blood (soft serve), no urgency or nighttime stools. Continues with rowasa enemas at night and consistent with lialda 4.8 g daily.    ROS:    No fevers or chills  No weight loss  No blurry vision, double vision or change in vision  No sore throat  No lymphadenopathy  No shortness of breath or wheezing  No chest pain or pressure  No arthralgias or myalgias  No rashes or skin changes  No dysphagia  No BRBPR, hematochezia  No dysuria, frequency or urgency  No hot/cold intolerance or polyria  No anxiety or depression    Extra intestinal manifestations of IBD:  No uveitis/episcleritis  No aphthous ulcers   No arthritis   No erythema  nodosum/pyoderma gangrenosum.     PERTINENT PAST MEDICAL HISTORY:  None     PREVIOUS SURGERIES:  Past Surgical History:   Procedure Laterality Date    CHOLECYSTECTOMY      COLONOSCOPY      COLONOSCOPY N/A 8/12/2019    Procedure: COLONOSCOPY, WITH POLYPECTOMY AND BIOPSY;  Surgeon: Yolanda Molina MD;  Location: [facility] OR    HC REMOVAL GALLBLADDER      Description: Cholecystectomy;  Recorded: 04/06/2011;     Surgical hx  -Cholecystectomy 2010  -3 children - vaginal x 3, Bladder sling placed 2016    PREVIOUS ENDOSCOPY:  See above    ALLERGIES:     Allergies   Allergen Reactions    Sulfa Antibiotics Hives     Other reaction(s): *Unknown       PERTINENT MEDICATIONS:    Current Outpatient Medications:     ACIDOPHILUS LACTOBACILLUS PO, Take 1 capsule by mouth (Patient not taking: Reported on 3/29/2023), Disp: , Rfl:     albuterol (PROAIR HFA/PROVENTIL HFA/VENTOLIN HFA) 108 (90 Base) MCG/ACT inhaler, Inhale 2 puffs into the lungs every 4 hours as needed for shortness of breath, wheezing or cough, Disp: 18 g, Rfl: 0    amitriptyline (ELAVIL) 10 MG tablet, Take 10 mg by mouth, Disp: , Rfl:     butalbital-acetaminophen-caffeine (FIORICET/ESGIC) -40 MG tablet, Take 1 tablet by mouth, Disp: , Rfl:     mesalamine (LIALDA) 1.2 g DR tablet, Take 4 tablets (4,800 mg) by mouth daily, Disp: 360 tablet, Rfl: 3    mesalamine (ROWASA) 4 g enema, Place 1 enema (4 g) rectally at bedtime, Disp: 1800 mL, Rfl: 11    Multiple Vitamins-Iron (MULTIVITAMIN/IRON PO), Take 1 tablet by mouth, Disp: , Rfl:     SOCIAL HISTORY:  Social History     Socioeconomic History    Marital status:      Spouse name: Not on file    Number of children: Not on file    Years of education: Not on file    Highest education level: Not on file   Occupational History    Not on file   Tobacco Use    Smoking status: Never    Smokeless tobacco: Never   Substance and Sexual Activity    Alcohol use: Yes     Comment: occasional    Drug use: Never    Sexual activity:  "Not on file   Other Topics Concern    Parent/sibling w/ CABG, MI or angioplasty before 65F 55M? Not Asked   Social History Narrative    Not on file     Social Determinants of Health     Financial Resource Strain: Not on file   Food Insecurity: Not on file   Transportation Needs: Not on file   Physical Activity: Not on file   Stress: Not on file   Social Connections: Not on file   Interpersonal Safety: Not on file   Housing Stability: Not on file     FAMILY HISTORY:  Family History   Problem Relation Age of Onset    Diverticulitis Father     Inflammatory Bowel Disease No family hx of     Colon Cancer No family hx of     Coronary Artery Disease Father     Cerebrovascular Disease Maternal Grandfather     Cerebrovascular Disease Paternal Grandfather        PHYSICAL EXAMINATION:  Constitutional: aaox3, cooperative, pleasant, not dyspneic/diaphoretic, no acute distress  Vitals reviewed: Ht 1.702 m (5' 7\")   Wt 59 kg (130 lb)   BMI 20.36 kg/m    Wt:   Wt Readings from Last 2 Encounters:   05/14/24 59 kg (130 lb)   01/17/24 61.7 kg (136 lb)      Constitutional - general appearance is well and in no acute distress. Body habitus normal  Eyes - No redness or discharge  Respiratory - No cough, unlabored breathing  Musculoskeletal - range of motion intact: Neck and arms  Skin - No discoloration or lesions  Neurological - No tremors, headaches  Psychiatric - No anxiety, alert & oriented    PERTINENT STUDIES:    Most recent hepatic panel:  Recent Labs   Lab Test 11/29/23  1602 09/18/23  1101   ALT 14 12   AST 15 17     Most recent creatinine:  Recent Labs   Lab Test 10/09/23  1339 09/18/23  1101   CR 0.60 0.81                         "

## 2024-05-14 NOTE — NURSING NOTE
Is the patient currently in the state of [place]? YES    Visit mode:VIDEO    If the visit is dropped, the patient can be reconnected by: VIDEO VISIT: Text to cell phone:   Telephone Information:   Mobile [phone]       Will anyone else be joining the visit? NO  (If patient encounters technical issues they should call [phone]    How would you like to obtain your AVS? MyChart    Are changes needed to the allergy or medication list? No    Are refills needed on medications prescribed by this physician? NO    Reason for visit: YORDAN CONNOR

## 2024-06-16 ENCOUNTER — HEALTH MAINTENANCE LETTER (OUTPATIENT)
Age: 49
End: 2024-06-16

## 2024-07-23 ENCOUNTER — VIRTUAL VISIT (OUTPATIENT)
Dept: GASTROENTEROLOGY | Facility: CLINIC | Age: 49
End: 2024-07-23
Attending: PHYSICIAN ASSISTANT
Payer: COMMERCIAL

## 2024-07-23 DIAGNOSIS — K50.10 SEGMENTAL COLITIS ASSOCIATED WITH DIVERTICULOSIS (H): Primary | ICD-10-CM

## 2024-07-23 DIAGNOSIS — K57.30 SEGMENTAL COLITIS ASSOCIATED WITH DIVERTICULOSIS (H): Primary | ICD-10-CM

## 2024-07-23 PROCEDURE — 99214 OFFICE O/P EST MOD 30 MIN: CPT | Mod: 95 | Performed by: PHYSICIAN ASSISTANT

## 2024-07-23 NOTE — PROGRESS NOTES
Virtual Visit Details    Type of service:  Video Visit     Originating Location (pt. Location): Home    Distant Location (provider location):  Off-site  Platform used for Video Visit: [facility]    IBD CLINIC VISIT -- follow up    CHIEF COMPLAINT: SCAD evaluation and management    SCAD HISTORY  Age at diagnosis: 44  Extent of disease: SCAD, left sided rui-diverticular inflammation  Prior SCAD Medications: Cipro/flagyl (helped)    DISEASE ASSESSMENT  Labs:  Recent Labs   Lab Test 12/14/23  1334 11/29/23  1602 10/09/23  1339 09/18/23  1101 06/19/19  1009 03/18/19  1652 06/20/18  1611   CRP  --   --   --   --  <2.9  --  0.6   SED  --  10  --  15 9  --  20   HGB 14.3 14.3   < > 15.6  --    < >  --     < > = values in this interval not displayed.     Endoscopic assessment:   Colonoscopy 12/20/2023 with congested erythematous eroded friable mucosa in the sigmoid and distal rectum.  Biopsies show moderate active inflammation.    8/2019 icscope showed Dee 2 colitis from 30 cm-40cm. Diverticulosis in the sigmoid. Rest of colon was normal.     PATH:  SPECIMEN(S):   A: Cecal biopsy   B: Colon biopsy, ascending   C: Colon biopsy, transverse   D: Colon biopsy, descending   E: Sigmoid colon biopsy   F: Rectal biopsy     FINAL DIAGNOSIS:   A. Cecum, biopsy:   Colonic mucosa with no pathologic abnormalities     B. Ascending Colon, Biopsy:   Colonic mucosa with no pathologic abnormalities     C. Transverse Colon, biopsy:   Colonic mucosa with no pathologic abnormalities     D. Descending Colon, biopsy:   Colonic mucosa with no pathologic abnormalities     E. Sigmoid Colon, biopsy:   Colitis with crypt injury, moderately active, with rectum sparing (see   part F below); consistent with SCAD   (segmental colitisassociated diverticulosis); negative for dysplasia     F. Rectum, biopsy:   Rectal mucosa with no pathologic abnormalities     EGD 4/15/19 Normal, normal biopsies of esophagus, stomach and duodenum  3/25/19 colonoscopy for rectal  bleeding: left sided diverticular with edema, erythema, loss of vascular pattern noted in 2 segments (40-36 cm and 30-22 cm from the anus.    CT ab/pelvis 3/5/19:  Renal hypodensities stable from 4/16/19 exam, 2 mm calculus in right kidney, no gastric or small bowel abnormality  Fecal calprotectin: pending  C diff: negative per OSH records    ASSESSMENT/PLAN  Mrs. Salter is a 49 year old woman with history of cholecystectomy, 3 uncomplicated vaginal deliveries, thyroid cysts (per patient), and segmental colitis associated with diverticular disease who is following up for management of her disease.  She is on SCD diet.     Flare Dec 2023 with urgent bloody stools.  Fecal calprotectin greater than 3000.  Flex sig showed moderate active inflammation in the rectosigmoid.  She responded well to a short course of prednisone and restarting her Lialda in addition to continuation of Rowasa enemas.    Current medication: Lialda 4.8 g a day and Rowasa enemas nightly  Last endoscopic disease activity: Colonoscopy 12/20/2023 with congested erythematous eroded friable mucosa in the sigmoid and distal rectum.  Biopsies show moderate active inflammation.    We will proceed with the following plan:     Plan:  --- Continue Lialda 4.8 g/day   --- Continue rowasa enemas  --- Fecal calprotectin to be obtained this summer to trend.  If normal we will continue with the above regimen, if persistently elevated we will repeat colonoscopy and pending results discuss alternative advanced IBD therapies  --- Labs CBC, LFTs, CRP, ESR + creatinine this fall    Return to clinic in 6 months    Thank you for this consultation.  It was a pleasure to participate in the care of this patient; please contact us with any further questions.      Gerhard Nicolas PA-C  Division of Gastroenterology, Hepatology and Nutrition  [facility] [place]      HPI:   Mrs. Salter is a 49 year old woman with history of cholecystectomy, 3 uncomplicated vaginal  "deliveries, thyroid cysts (per patient), and recent diagnosis of segmental colitis associated with diverticular disease who is establishing care for management of her disease.    Per chart:  Patient seen about 2014 at [facility] for diverticulitis and last seen 5/2019 for follow up of SCAD and nausea.  Patient had colonoscopy 3/2019 with finding of edema, erythema, loss of and loss of vascular patter in 2 segments of left colon with mild to moderate diverticulosis.  Sigmoid biopsies showed mildly active chronic colitis consistent with SCAD.  Patient was treated with 10 day course of cipro/flagyl with improvement in symptoms followed by Lialda 4.8 G daily with possible improvement. On 4/11/19 the patient was found to be c.diff negative.      At time of consultation, patient reported frustation with lack of symptom improvement and communication at prior GI providers office. She presented here.  Patient reports 5 years of alternating diarrhea and constipation along with occassional undigested food in stool and borrygymi.  She reports had initial improvement in symptoms following cipro/flagyl and mesalamine but called in as still was having blood streaked stools for which she was started on rowasa enemas.  She is concerned she is still flaring despite the therapies and putting herself on a low residue \"specific carbohydrate diet\" supplemented with \"high protein predigested cancer shakes\".  She reports weight loss in the setting of biking 12 miles daily and changed diet.    At time of consultation the patient reports two semiformed stools daily with blood streaks in all stools (10% blood) and with wiping.  She notes one day a week she will have bloating, increased flatus, general malaise, 5-6 watery BM's along with no associated abdominal pain.      Patient also reports 10 years of chronic food getting stuck in her esophagus until drinks some water with the food.  It doesn't happen with draper, but rather typically with meats. "  She has never had a food impaction.  Happens with every meal.  No odynophagia, no nausea currently, no vomiting, rare reflux/heartburn. PPI not helpful in the past.  Reports was having nausea but improved with simple carbohydrate diet.  Reports when was nauseated, it was typically in the morning and improved with a protein breakfast sandwich.    Prior report of dysphagia and nausea,  Had upper endoscopy 2019, 1 gastric polyp (fundic gland).  Separate mid/distal esophageal, gastric and duodenal biopsies negative.    Patient notes Hx of thyroid cysts and borderline TSH levels.       Social hx   -no smoking  -1-2 x month nsaids  -rare alcohol   -no illicit  -Bike 12 miles a day, weights    Fam Hx  Mother with history colitis - no issues in years, colon polyps  Maternal grandfather  of colon cancer at age 76  Both parents have diverticulitis    Interval history, 2019  In August, started cipro x 2 weeks, flagyl x 2 weeks, lialda and prednisone. Notices increased pain when delays Lialda by a few hours (increased pain). Now down to prednisone 10 mg daily, going to 5 mg daily on Friday. Feels much improved. Blood has stopped; left sided pain has improved. Gained 5 lb since August. Continues on SCD; met with Rebekah Browne.    No upcoming travel plans.     Interval history, 2019  Continues on SCD, which has helped a lot.     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   Off prednisone x 3 weeks. Continues on Cortenema daily x 2 months.   Also on Lialda 4.8 g daily.     Having 2 BMs per day (non-bloody, well-formed). No blood in stool since August.   Gained a few lb since Sep.     Interval history, 2020 (virtual visit)  Got sick in Dec with wheezing and was dx with walking PNA . Treated with abx and then prednisone x 5 days.   Continues on 5 mg prednisone daily and has difficulty weaning off it due to recurrent abd pain. Has been on prednisone 5 mg x 1 month.     Having 2 BMs per day, non  bloody, loose.     For SCAD, currently on Lialda 4.8g and Rowasa enemas.     Interval history, 12/2020 (virtual visit)  In beginning of December, had a flare in the setting of stress, with increased bowel frequency and LLQ abd pain. Started on Rowasa enemas with significant help. But around 12/25 ate a piece of cake and developed postprandial nausea and epigastric discomfort.      Continues on Lialda 4.8g daily and daily Rowasa enemas.     Currently having 2 BMs per day, soft. No blood.     EGD in 4/2019 was normal.     Interval history, 3/24/22  She has continued with SCD (since Aug 2019) and feels that symptoms are quite stable. She continues with lialda 4.8g daily and rowasa enemas every other night. Currently having 2 stools per day, formed no blood. No abdominal pain. If she deviates from her diet, then she may have some deviation from her baseline, but overall very stable.     She has bad migraines and is currently has one today.    Interval hx 10/26/22  Symptoms over the last 6 months. 1-3 stools per day, slightly looser than normal, variable consistency from more firm to looser stools.  No blood in the stool.  No urgency or nighttime stool.  She has been consistent and compliant with lialda and rowasa. Over the last 6 months she has had discomfort in lower left quadrant. Very tired, minimal appetite. Trying to minimize grains and sugar.    A lot of stress with father in law passing and coworker passing suddenly. Submitted message through portal to get rx for steroids and antibiotics which has worked well in the past.     Interval hx 3/29/23  Cipro/flagyl allowed for improvement (rifaximin denied). Has been quite stable other than 2 weeks at end of Feb 2023. Bowel pattern fluctuates with what she perceives to be her stress level. Avg 2 BM daily that are formed. No blood in the stool. She continues with lialda 4.8g daily and rowasa daily.    Interval hx 9/12/23  Earlier last week began having abdominal  "discomfort in lower abdomen.  Over the weekend had moderate to severe pain across abdomen. Had chills, but did not take temp.Only doing rowasa enemas, self d/c'd lialda March 2023 bc due for blood work but ultimately did not complete. Rested and hydrated and seems to be improving. LLQ pain is still there and slightly worse than normal but improving. No fever or chills.    Interval hx 11/14/23  She continues with rowasa enemas (Holding lialda for now). Bowel pattern is stable, no urgency. No urgency, blood in the stool or nighttime stools. No fevers or chills.   Continues on SCD.  Busy with school, computer science degree.     Interval hx 1/17/24  Had a flare shortly after last visit prompting a flexible sigmoidoscopy showing moderate active inflammation in the rectosigmoid requiring prednisone.  She was able to successfully taper off prednisone and continue with Lialda and Rowasa enemas.  Currently having 2 stools per day, soft serve. No blood in the stool for the last 2 weeks. Off prednisone now. Consistent with Rowasa enemas and Lialda daily. No abdominal pain. No fevers.     Interval hx 5/14/24  Had an uptick of symptoms around finals, but now feeling better. She is currently having 2 stools per day, formed and no blood (soft serve), no urgency or nighttime stools. Continues with rowasa enemas at night and consistent with lialda 4.8 g daily.    Interval hx 7/23/24  Had uptick of symptoms, which she feels like the stomach upset was \"higher up,\" however did not affect frequency or consistency of stools. Baseline stool pattern (2 per day). No blood in the stool. Continues with rowasa enemas at night and consistent with lialda 4.8 g daily. No EIM.    ROS:    No fevers or chills  No weight loss  No blurry vision, double vision or change in vision  No sore throat  No lymphadenopathy  No shortness of breath or wheezing  No chest pain or pressure  No arthralgias or myalgias  No rashes or skin changes  No dysphagia  No " BRBPR, hematochezia  No dysuria, frequency or urgency  No hot/cold intolerance or polyria  No anxiety or depression    Extra intestinal manifestations of IBD:  No uveitis/episcleritis  No aphthous ulcers   No arthritis   No erythema nodosum/pyoderma gangrenosum.     PERTINENT PAST MEDICAL HISTORY:  None     PREVIOUS SURGERIES:  Past Surgical History:   Procedure Laterality Date    CHOLECYSTECTOMY      COLONOSCOPY      COLONOSCOPY N/A 8/12/2019    Procedure: COLONOSCOPY, WITH POLYPECTOMY AND BIOPSY;  Surgeon: Yolanda Molina MD;  Location: [facility] OR    HC REMOVAL GALLBLADDER      Description: Cholecystectomy;  Recorded: 04/06/2011;     Surgical hx  -Cholecystectomy 2010  -3 children - vaginal x 3, Bladder sling placed 2016    PREVIOUS ENDOSCOPY:  See above    ALLERGIES:     Allergies   Allergen Reactions    Sulfa Antibiotics Hives     Other reaction(s): *Unknown       PERTINENT MEDICATIONS:    Current Outpatient Medications:     ACIDOPHILUS LACTOBACILLUS PO, Take 1 capsule by mouth (Patient not taking: Reported on 3/29/2023), Disp: , Rfl:     albuterol (PROAIR HFA/PROVENTIL HFA/VENTOLIN HFA) 108 (90 Base) MCG/ACT inhaler, Inhale 2 puffs into the lungs every 4 hours as needed for shortness of breath, wheezing or cough, Disp: 18 g, Rfl: 0    amitriptyline (ELAVIL) 10 MG tablet, Take 10 mg by mouth, Disp: , Rfl:     butalbital-acetaminophen-caffeine (FIORICET/ESGIC) -40 MG tablet, Take 1 tablet by mouth, Disp: , Rfl:     mesalamine (LIALDA) 1.2 g DR tablet, Take 4 tablets (4,800 mg) by mouth daily, Disp: 360 tablet, Rfl: 3    mesalamine (ROWASA) 4 g enema, Place 1 enema (4 g) rectally at bedtime, Disp: 1800 mL, Rfl: 11    Multiple Vitamins-Iron (MULTIVITAMIN/IRON PO), Take 1 tablet by mouth, Disp: , Rfl:     SOCIAL HISTORY:  Social History     Socioeconomic History    Marital status:      Spouse name: Not on file    Number of children: Not on file    Years of education: Not on file    Highest education level:  Not on file   Occupational History    Not on file   Tobacco Use    Smoking status: Never    Smokeless tobacco: Never   Substance and Sexual Activity    Alcohol use: Yes     Comment: occasional    Drug use: Never    Sexual activity: Not on file   Other Topics Concern    Parent/sibling w/ CABG, MI or angioplasty before 65F 55M? Not Asked   Social History Narrative    Not on file     Social Determinants of Health     Financial Resource Strain: Not on file   Food Insecurity: Not on file   Transportation Needs: Not on file   Physical Activity: Not on file   Stress: Not on file   Social Connections: Not on file   Interpersonal Safety: Not on file   Housing Stability: Not on file     FAMILY HISTORY:  Family History   Problem Relation Age of Onset    Diverticulitis Father     Inflammatory Bowel Disease No family hx of     Colon Cancer No family hx of     Coronary Artery Disease Father     Cerebrovascular Disease Maternal Grandfather     Cerebrovascular Disease Paternal Grandfather        PHYSICAL EXAMINATION:  Constitutional: aaox3, cooperative, pleasant, not dyspneic/diaphoretic, no acute distress  Vitals reviewed: There were no vitals taken for this visit.  Wt:   Wt Readings from Last 2 Encounters:   05/14/24 59 kg (130 lb)   01/17/24 61.7 kg (136 lb)      Constitutional - general appearance is well and in no acute distress. Body habitus normal  Eyes - No redness or discharge  Respiratory - No cough, unlabored breathing  Musculoskeletal - range of motion intact: Neck and arms  Skin - No discoloration or lesions  Neurological - No tremors, headaches  Psychiatric - No anxiety, alert & oriented    PERTINENT STUDIES:    Most recent hepatic panel:  Recent Labs   Lab Test 11/29/23  1602 09/18/23  1101   ALT 14 12   AST 15 17     Most recent creatinine:  Recent Labs   Lab Test 10/09/23  1339 09/18/23  1101   CR 0.60 0.81

## 2024-07-23 NOTE — NURSING NOTE
Current patient location: Patient declined to provide     Is the patient currently in the state of [place]? YES    Visit mode:VIDEO    If the visit is dropped, the patient can be reconnected by: VIDEO VISIT: Text to cell phone:   Telephone Information:   Mobile [phone]       Will anyone else be joining the visit? NO  (If patient encounters technical issues they should call [phone])    How would you like to obtain your AVS? MyChart    Are changes needed to the allergy or medication list? No    Are refills needed on medications prescribed by this physician? NO    Reason for visit: RECHELADIO CONNOR

## 2024-07-23 NOTE — LETTER
7/23/2024      Velma Mcfarlane  [address] [facility] [address]      Dear Colleague,    Thank you for referring your patient, Velma Mcfarlane, to the [facility] [place]. Please see a copy of my visit note below.                                  IBD CLINIC VISIT -- follow up    CHIEF COMPLAINT: SCAD evaluation and management    SCAD HISTORY  Age at diagnosis: 44  Extent of disease: SCAD, left sided uri-diverticular inflammation  Prior SCAD Medications: Cipro/flagyl (helped)    DISEASE ASSESSMENT  Labs:  Recent Labs   Lab Test 12/14/23  1334 11/29/23  1602 10/09/23  1339 09/18/23  1101 06/19/19  1009 03/18/19  1652 06/20/18  1611   CRP  --   --   --   --  <2.9  --  0.6   SED  --  10  --  15 9  --  20   HGB 14.3 14.3   < > 15.6  --    < >  --     < > = values in this interval not displayed.     Endoscopic assessment:   Colonoscopy 12/20/2023 with congested erythematous eroded friable mucosa in the sigmoid and distal rectum.  Biopsies show moderate active inflammation.    8/2019 icscope showed Dee 2 colitis from 30 cm-40cm. Diverticulosis in the sigmoid. Rest of colon was normal.     PATH:  SPECIMEN(S):   A: Cecal biopsy   B: Colon biopsy, ascending   C: Colon biopsy, transverse   D: Colon biopsy, descending   E: Sigmoid colon biopsy   F: Rectal biopsy     FINAL DIAGNOSIS:   A. Cecum, biopsy:   Colonic mucosa with no pathologic abnormalities     B. Ascending Colon, Biopsy:   Colonic mucosa with no pathologic abnormalities     C. Transverse Colon, biopsy:   Colonic mucosa with no pathologic abnormalities     D. Descending Colon, biopsy:   Colonic mucosa with no pathologic abnormalities     E. Sigmoid Colon, biopsy:   Colitis with crypt injury, moderately active, with rectum sparing (see   part F below); consistent with SCAD   (segmental colitisassociated diverticulosis); negative for dysplasia     F. Rectum, biopsy:   Rectal mucosa with no  pathologic abnormalities     EGD 4/15/19 Normal, normal biopsies of esophagus, stomach and duodenum  3/25/19 colonoscopy for rectal bleeding: left sided diverticular with edema, erythema, loss of vascular pattern noted in 2 segments (40-36 cm and 30-22 cm from the anus.    CT ab/pelvis 3/5/19:  Renal hypodensities stable from 4/16/19 exam, 2 mm calculus in right kidney, no gastric or small bowel abnormality  Fecal calprotectin: pending  C diff: negative per OSH records    ASSESSMENT/PLAN  Mrs. Salter is a 49 year old woman with history of cholecystectomy, 3 uncomplicated vaginal deliveries, thyroid cysts (per patient), and segmental colitis associated with diverticular disease who is following up for management of her disease.  She is on SCD diet.     Flare Dec 2023 with urgent bloody stools.  Fecal calprotectin greater than 3000.  Flex sig showed moderate active inflammation in the rectosigmoid.  She responded well to a short course of prednisone and restarting her Lialda in addition to continuation of Rowasa enemas.    Current medication: Lialda 4.8 g a day and Rowasa enemas nightly  Last endoscopic disease activity: Colonoscopy 12/20/2023 with congested erythematous eroded friable mucosa in the sigmoid and distal rectum.  Biopsies show moderate active inflammation.    We will proceed with the following plan:     Plan:  --- Continue Lialda 4.8 g/day   --- Continue rowasa enemas  --- Fecal calprotectin to be obtained this summer to trend.  If normal we will continue with the above regimen, if persistently elevated we will repeat colonoscopy and pending results discuss alternative advanced IBD therapies  --- Labs CBC, LFTs, CRP, ESR + creatinine this fall    Return to clinic in 6 months    Thank you for this consultation.  It was a pleasure to participate in the care of this patient; please contact us with any further questions.      Gerhard Nicolas PA-C  Division of Gastroenterology, Hepatology and  "Nutrition  [facility]      HPI:   Mrs. Salter is a 49 year old woman with history of cholecystectomy, 3 uncomplicated vaginal deliveries, thyroid cysts (per patient), and recent diagnosis of segmental colitis associated with diverticular disease who is establishing care for management of her disease.    Per chart:  Patient seen about 2014 at [facility] for diverticulitis and last seen 5/2019 for follow up of SCAD and nausea.  Patient had colonoscopy 3/2019 with finding of edema, erythema, loss of and loss of vascular patter in 2 segments of left colon with mild to moderate diverticulosis.  Sigmoid biopsies showed mildly active chronic colitis consistent with SCAD.  Patient was treated with 10 day course of cipro/flagyl with improvement in symptoms followed by Lialda 4.8 G daily with possible improvement. On 4/11/19 the patient was found to be c.diff negative.      At time of consultation, patient reported frustation with lack of symptom improvement and communication at prior GI providers office. She presented here.  Patient reports 5 years of alternating diarrhea and constipation along with occassional undigested food in stool and borrygymi.  She reports had initial improvement in symptoms following cipro/flagyl and mesalamine but called in as still was having blood streaked stools for which she was started on rowasa enemas.  She is concerned she is still flaring despite the therapies and putting herself on a low residue \"specific carbohydrate diet\" supplemented with \"high protein predigested cancer shakes\".  She reports weight loss in the setting of biking 12 miles daily and changed diet.    At time of consultation the patient reports two semiformed stools daily with blood streaks in all stools (10% blood) and with wiping.  She notes one day a week she will have bloating, increased flatus, general malaise, 5-6 watery BM's along with no associated abdominal pain.      Patient also reports 10 years of chronic " food getting stuck in her esophagus until drinks some water with the food.  It doesn't happen with draper, but rather typically with meats.  She has never had a food impaction.  Happens with every meal.  No odynophagia, no nausea currently, no vomiting, rare reflux/heartburn. PPI not helpful in the past.  Reports was having nausea but improved with simple carbohydrate diet.  Reports when was nauseated, it was typically in the morning and improved with a protein breakfast sandwich.    Prior report of dysphagia and nausea,  Had upper endoscopy 2019, 1 gastric polyp (fundic gland).  Separate mid/distal esophageal, gastric and duodenal biopsies negative.    Patient notes Hx of thyroid cysts and borderline TSH levels.       Social hx   -no smoking  -1-2 x month nsaids  -rare alcohol   -no illicit  -Bike 12 miles a day, weights    Fam Hx  Mother with history colitis - no issues in years, colon polyps  Maternal grandfather  of colon cancer at age 76  Both parents have diverticulitis    Interval history, 2019  In August, started cipro x 2 weeks, flagyl x 2 weeks, lialda and prednisone. Notices increased pain when delays Lialda by a few hours (increased pain). Now down to prednisone 10 mg daily, going to 5 mg daily on Friday. Feels much improved. Blood has stopped; left sided pain has improved. Gained 5 lb since August. Continues on SCD; met with Rebekah Browne.    No upcoming travel plans.     Interval history, 2019  Continues on SCD, which has helped a lot.     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   Off prednisone x 3 weeks. Continues on Cortenema daily x 2 months.   Also on Lialda 4.8 g daily.     Having 2 BMs per day (non-bloody, well-formed). No blood in stool since August.   Gained a few lb since Sep.     Interval history, 2020 (virtual visit)  Got sick in Dec with wheezing and was dx with walking PNA . Treated with abx and then prednisone x 5 days.   Continues on 5 mg  prednisone daily and has difficulty weaning off it due to recurrent abd pain. Has been on prednisone 5 mg x 1 month.     Having 2 BMs per day, non bloody, loose.     For SCAD, currently on Lialda 4.8g and Rowasa enemas.     Interval history, 12/2020 (virtual visit)  In beginning of December, had a flare in the setting of stress, with increased bowel frequency and LLQ abd pain. Started on Rowasa enemas with significant help. But around 12/25 ate a piece of cake and developed postprandial nausea and epigastric discomfort.      Continues on Lialda 4.8g daily and daily Rowasa enemas.     Currently having 2 BMs per day, soft. No blood.     EGD in 4/2019 was normal.     Interval history, 3/24/22  She has continued with SCD (since Aug 2019) and feels that symptoms are quite stable. She continues with lialda 4.8g daily and rowasa enemas every other night. Currently having 2 stools per day, formed no blood. No abdominal pain. If she deviates from her diet, then she may have some deviation from her baseline, but overall very stable.     She has bad migraines and is currently has one today.    Interval hx 10/26/22  Symptoms over the last 6 months. 1-3 stools per day, slightly looser than normal, variable consistency from more firm to looser stools.  No blood in the stool.  No urgency or nighttime stool.  She has been consistent and compliant with lialda and rowasa. Over the last 6 months she has had discomfort in lower left quadrant. Very tired, minimal appetite. Trying to minimize grains and sugar.    A lot of stress with father in law passing and coworker passing suddenly. Submitted message through portal to get rx for steroids and antibiotics which has worked well in the past.     Interval hx 3/29/23  Cipro/flagyl allowed for improvement (rifaximin denied). Has been quite stable other than 2 weeks at end of Feb 2023. Bowel pattern fluctuates with what she perceives to be her stress level. Avg 2 BM daily that are formed. No  "blood in the stool. She continues with lialda 4.8g daily and rowasa daily.    Interval hx 9/12/23  Earlier last week began having abdominal discomfort in lower abdomen.  Over the weekend had moderate to severe pain across abdomen. Had chills, but did not take temp.Only doing rowasa enemas, self d/c'd lialda March 2023 bc due for blood work but ultimately did not complete. Rested and hydrated and seems to be improving. LLQ pain is still there and slightly worse than normal but improving. No fever or chills.    Interval hx 11/14/23  She continues with rowasa enemas (Holding lialda for now). Bowel pattern is stable, no urgency. No urgency, blood in the stool or nighttime stools. No fevers or chills.   Continues on SCD.  Busy with school, computer science degree.     Interval hx 1/17/24  Had a flare shortly after last visit prompting a flexible sigmoidoscopy showing moderate active inflammation in the rectosigmoid requiring prednisone.  She was able to successfully taper off prednisone and continue with Lialda and Rowasa enemas.  Currently having 2 stools per day, soft serve. No blood in the stool for the last 2 weeks. Off prednisone now. Consistent with Rowasa enemas and Lialda daily. No abdominal pain. No fevers.     Interval hx 5/14/24  Had an uptick of symptoms around finals, but now feeling better. She is currently having 2 stools per day, formed and no blood (soft serve), no urgency or nighttime stools. Continues with rowasa enemas at night and consistent with lialda 4.8 g daily.    Interval hx 7/23/24  Had uptick of symptoms, which she feels like the stomach upset was \"higher up,\" however did not affect frequency or consistency of stools. Baseline stool pattern (2 per day). No blood in the stool. Continues with rowasa enemas at night and consistent with lialda 4.8 g daily. No EIM.    ROS:    No fevers or chills  No weight loss  No blurry vision, double vision or change in vision  No sore throat  No " lymphadenopathy  No shortness of breath or wheezing  No chest pain or pressure  No arthralgias or myalgias  No rashes or skin changes  No dysphagia  No BRBPR, hematochezia  No dysuria, frequency or urgency  No hot/cold intolerance or polyria  No anxiety or depression    Extra intestinal manifestations of IBD:  No uveitis/episcleritis  No aphthous ulcers   No arthritis   No erythema nodosum/pyoderma gangrenosum.     PERTINENT PAST MEDICAL HISTORY:  None     PREVIOUS SURGERIES:  Past Surgical History:   Procedure Laterality Date    CHOLECYSTECTOMY      COLONOSCOPY      COLONOSCOPY N/A 8/12/2019    Procedure: COLONOSCOPY, WITH POLYPECTOMY AND BIOPSY;  Surgeon: Yolanda Molina MD;  Location: [facility] OR    HC REMOVAL GALLBLADDER      Description: Cholecystectomy;  Recorded: 04/06/2011;     Surgical hx  -Cholecystectomy 2010  -3 children - vaginal x 3, Bladder sling placed 2016    PREVIOUS ENDOSCOPY:  See above    ALLERGIES:     Allergies   Allergen Reactions    Sulfa Antibiotics Hives     Other reaction(s): *Unknown       PERTINENT MEDICATIONS:    Current Outpatient Medications:     ACIDOPHILUS LACTOBACILLUS PO, Take 1 capsule by mouth (Patient not taking: Reported on 3/29/2023), Disp: , Rfl:     albuterol (PROAIR HFA/PROVENTIL HFA/VENTOLIN HFA) 108 (90 Base) MCG/ACT inhaler, Inhale 2 puffs into the lungs every 4 hours as needed for shortness of breath, wheezing or cough, Disp: 18 g, Rfl: 0    amitriptyline (ELAVIL) 10 MG tablet, Take 10 mg by mouth, Disp: , Rfl:     butalbital-acetaminophen-caffeine (FIORICET/ESGIC) -40 MG tablet, Take 1 tablet by mouth, Disp: , Rfl:     mesalamine (LIALDA) 1.2 g DR tablet, Take 4 tablets (4,800 mg) by mouth daily, Disp: 360 tablet, Rfl: 3    mesalamine (ROWASA) 4 g enema, Place 1 enema (4 g) rectally at bedtime, Disp: 1800 mL, Rfl: 11    Multiple Vitamins-Iron (MULTIVITAMIN/IRON PO), Take 1 tablet by mouth, Disp: , Rfl:     SOCIAL HISTORY:  Social History     Socioeconomic History     Marital status:      Spouse name: Not on file    Number of children: Not on file    Years of education: Not on file    Highest education level: Not on file   Occupational History    Not on file   Tobacco Use    Smoking status: Never    Smokeless tobacco: Never   Substance and Sexual Activity    Alcohol use: Yes     Comment: occasional    Drug use: Never    Sexual activity: Not on file   Other Topics Concern    Parent/sibling w/ CABG, MI or angioplasty before 65F 55M? Not Asked   Social History Narrative    Not on file     Social Determinants of Health     Financial Resource Strain: Not on file   Food Insecurity: Not on file   Transportation Needs: Not on file   Physical Activity: Not on file   Stress: Not on file   Social Connections: Not on file   Interpersonal Safety: Not on file   Housing Stability: Not on file     FAMILY HISTORY:  Family History   Problem Relation Age of Onset    Diverticulitis Father     Inflammatory Bowel Disease No family hx of     Colon Cancer No family hx of     Coronary Artery Disease Father     Cerebrovascular Disease Maternal Grandfather     Cerebrovascular Disease Paternal Grandfather        PHYSICAL EXAMINATION:  Constitutional: aaox3, cooperative, pleasant, not dyspneic/diaphoretic, no acute distress  Vitals reviewed: There were no vitals taken for this visit.  Wt:   Wt Readings from Last 2 Encounters:   05/14/24 59 kg (130 lb)   01/17/24 61.7 kg (136 lb)      Constitutional - general appearance is well and in no acute distress. Body habitus normal  Eyes - No redness or discharge  Respiratory - No cough, unlabored breathing  Musculoskeletal - range of motion intact: Neck and arms  Skin - No discoloration or lesions  Neurological - No tremors, headaches  Psychiatric - No anxiety, alert & oriented    PERTINENT STUDIES:    Most recent hepatic panel:  Recent Labs   Lab Test 11/29/23  1602 09/18/23  1101   ALT 14 12   AST 15 17     Most recent creatinine:  Recent Labs   Lab Test  10/09/23  1339 09/18/23  1101   CR 0.60 0.81           Again, thank you for allowing me to participate in the care of your patient.      Sincerely,    Gerhard Nicolas PA-C

## 2024-07-23 NOTE — PATIENT INSTRUCTIONS
It was a pleasure taking care of you today.  I've included a brief summary of our discussion and care plan from today's visit below.  Please review this information with your primary care provider.  ______________________________________________________________________    My recommendations are summarized as follows:    -- Continue lialda 4 pills daily and rowasa nightly  -- stool sample this summer  -- stool sample this summer (June-ish)  -- Patient with IBD we recommend supplementation vitamin D 1000 units daily and calcium 500 mg twice daily.  -- No NSAIDs (ibuprofen, or anything containing ibuprofen)    For additional resources about inflammatory bowel disease visit http://www.crohnscolitisfoundation.org/    To learn more about Diet and Nutrition in the setting of IBD, check out some of these resources:  https://www.crohnscolitisfoundation.org/diet-and-nutrition/what-should-i-eat  https://www.nimbal.org/  https://ntforibd.org/    Return to GI Clinic in 3 months to review your progress.    ______________________________________________________________________    How do I schedule labs, imaging studies, or procedures that were ordered in clinic today?     Labs: To schedule lab appointment at the [facility], use my chart or call [phone]. If you have a [place] lab closer to home where you are regularly seen you can give them a call.     Procedures: If a colonoscopy, upper endoscopy, breath test, esophageal manometry, or pH impedence was ordered today, our endoscopy team will call you to schedule this. If you have not heard from our endoscopy team within a week, please call [phone] to schedule.     Imaging Studies: If you were scheduled for a CT scan, X-ray, MRI, ultrasound, HIDA scan or other imaging study, please call [phone] to have this scheduled.     Referral: If a referral to another specialty was ordered, expect a phone call or follow instructions above. If you have not heard  from anyone regarding your referral in a week, please call our clinic to check the status.     Who do I call with any questions after my visit?  Please be in touch if there are any further questions that arise following today's visit.  There are multiple ways to contact your gastroenterology care team.      During business hours, you may reach a Gastroenterology nurse at [phone]    To schedule or reschedule an appointment, please call [phone].     You can always send a secure message through [employer].  [employer]uda messages are answered by your nurse or doctor typically within 24 hours.  Please allow extra time on weekends and holidays.      For urgent/emergent questions after business hours, you may reach the on-call GI Fellow by contacting the [facility]  at [phone].     How will I get the results of any tests ordered?    You will receive all of your results.  If you have signed up for [employer], any tests ordered at your visit will be available to you after your physician reviews them.  Typically this takes 1-2 weeks.  If there are urgent results that require a change in your care plan, your physician or nurse will call you to discuss the next steps.      What is Dacuda?  Dacuda is a secure way for you to access all of your healthcare records from the [facility].  It is a web based computer program, so you can sign on to it from any location.  It also allows you to send secure messages to your care team.  I recommend signing up for Dacuda access if you have not already done so and are comfortable with using a computer.         Sincerely,    Gerhard Nicolas PA-C  [facility]  Division of Gastroenterology

## 2024-07-26 ENCOUNTER — TELEPHONE (OUTPATIENT)
Dept: RADIOLOGY | Facility: CLINIC | Age: 49
End: 2024-07-26
Payer: COMMERCIAL

## 2024-07-26 NOTE — TELEPHONE ENCOUNTER
Left Voicemail (1st Attempt) and Sent [employer] (1st Attempt) for the patient to call back and schedule the following:Annual follow up Splenic artery aneurysm.    Location: [facility]  Provider: [facility]  Appointment type: Return Vascular Patient    Appointment mode virtual visit  Return date: October 2024    Specialty phone number: [phone] or  [phone]      Is Imaging Needed: MRA Scan  Imaging Phone Number: [phone]    Additional Notes: Please schedule imaging prior to Provider visit.  Tanvir Xiong on 7/26/2024 at 10:30 AM

## 2024-08-03 ENCOUNTER — APPOINTMENT (OUTPATIENT)
Dept: CT IMAGING | Facility: CLINIC | Age: 49
End: 2024-08-03
Attending: EMERGENCY MEDICINE
Payer: COMMERCIAL

## 2024-08-03 ENCOUNTER — HOSPITAL ENCOUNTER (EMERGENCY)
Facility: CLINIC | Age: 49
Discharge: HOME | End: 2024-08-03
Admitting: EMERGENCY MEDICINE
Payer: COMMERCIAL

## 2024-08-03 VITALS
TEMPERATURE: 98 F | HEIGHT: 67 IN | OXYGEN SATURATION: 94 % | BODY MASS INDEX: 19.62 KG/M2 | HEART RATE: 89 BPM | WEIGHT: 125 LBS | SYSTOLIC BLOOD PRESSURE: 144 MMHG | RESPIRATION RATE: 18 BRPM | DIASTOLIC BLOOD PRESSURE: 79 MMHG

## 2024-08-03 DIAGNOSIS — M54.9 BACK PAIN: ICD-10-CM

## 2024-08-03 LAB
ALBUMIN SERPL BCG-MCNC: 4.9 G/DL (ref 3.5–5.2)
ALBUMIN UR-MCNC: NEGATIVE MG/DL
ALP SERPL-CCNC: 74 U/L (ref 40–150)
ALT SERPL W P-5'-P-CCNC: 14 U/L (ref 0–70)
ANION GAP SERPL CALCULATED.3IONS-SCNC: 7 MMOL/L (ref 7–15)
APPEARANCE UR: CLEAR
AST SERPL W P-5'-P-CCNC: 18 U/L (ref 0–45)
BACTERIA #/AREA URNS HPF: ABNORMAL /HPF
BILIRUB DIRECT SERPL-MCNC: <0.2 MG/DL (ref 0–0.3)
BILIRUB SERPL-MCNC: 0.3 MG/DL
BILIRUB UR QL STRIP: NEGATIVE
BUN SERPL-MCNC: 16 MG/DL (ref 6–20)
CALCIUM SERPL-MCNC: 9.3 MG/DL (ref 8.8–10.4)
CHLORIDE SERPL-SCNC: 101 MMOL/L (ref 98–107)
COLOR UR AUTO: ABNORMAL
CREAT SERPL-MCNC: 0.62 MG/DL (ref 0.51–1.17)
CRP SERPL-MCNC: <3 MG/L
EGFRCR SERPLBLD CKD-EPI 2021: >90 ML/MIN/1.73M2
ERYTHROCYTE [DISTWIDTH] IN BLOOD BY AUTOMATED COUNT: 13.8 % (ref 10–15)
ERYTHROCYTE [SEDIMENTATION RATE] IN BLOOD BY WESTERGREN METHOD: 8 MM/HR (ref 0–20)
GLUCOSE SERPL-MCNC: 96 MG/DL (ref 70–99)
GLUCOSE UR STRIP-MCNC: NEGATIVE MG/DL
HCO3 SERPL-SCNC: 28 MMOL/L (ref 22–29)
HCT VFR BLD AUTO: 46.2 % (ref 35–53)
HGB BLD-MCNC: 15.7 G/DL (ref 11.7–17.7)
HGB UR QL STRIP: NEGATIVE
KETONES UR STRIP-MCNC: NEGATIVE MG/DL
LEUKOCYTE ESTERASE UR QL STRIP: NEGATIVE
LIPASE SERPL-CCNC: 40 U/L (ref 13–60)
MCH RBC QN AUTO: 30.3 PG (ref 26.5–33)
MCHC RBC AUTO-ENTMCNC: 34 G/DL (ref 31.5–36.5)
MCV RBC AUTO: 89 FL (ref 78–100)
NITRATE UR QL: NEGATIVE
PH UR STRIP: 5.5 [PH] (ref 5–7)
PLATELET # BLD AUTO: 347 10E3/UL (ref 150–450)
POTASSIUM SERPL-SCNC: 4.2 MMOL/L (ref 3.4–5.3)
PROT SERPL-MCNC: 8.4 G/DL (ref 6.4–8.3)
RBC # BLD AUTO: 5.19 10E6/UL (ref 3.8–5.9)
RBC URINE: <1 /HPF
SODIUM SERPL-SCNC: 136 MMOL/L (ref 135–145)
SP GR UR STRIP: 1.01 (ref 1–1.03)
SQUAMOUS EPITHELIAL: <1 /HPF
UROBILINOGEN UR STRIP-MCNC: <2 MG/DL
WBC # BLD AUTO: 11.4 10E3/UL (ref 4–11)
WBC URINE: <1 /HPF

## 2024-08-03 PROCEDURE — 82248 BILIRUBIN DIRECT: CPT | Performed by: EMERGENCY MEDICINE

## 2024-08-03 PROCEDURE — 250N000013 HC RX MED GY IP 250 OP 250 PS 637: Performed by: EMERGENCY MEDICINE

## 2024-08-03 PROCEDURE — 85014 HEMATOCRIT: CPT | Performed by: EMERGENCY MEDICINE

## 2024-08-03 PROCEDURE — 74177 CT ABD & PELVIS W/CONTRAST: CPT

## 2024-08-03 PROCEDURE — 81003 URINALYSIS AUTO W/O SCOPE: CPT | Performed by: EMERGENCY MEDICINE

## 2024-08-03 PROCEDURE — 80048 BASIC METABOLIC PNL TOTAL CA: CPT | Performed by: EMERGENCY MEDICINE

## 2024-08-03 PROCEDURE — 86140 C-REACTIVE PROTEIN: CPT | Performed by: EMERGENCY MEDICINE

## 2024-08-03 PROCEDURE — 99285 EMERGENCY DEPT VISIT HI MDM: CPT | Mod: 25

## 2024-08-03 PROCEDURE — 36415 COLL VENOUS BLD VENIPUNCTURE: CPT | Performed by: EMERGENCY MEDICINE

## 2024-08-03 PROCEDURE — 83690 ASSAY OF LIPASE: CPT | Performed by: EMERGENCY MEDICINE

## 2024-08-03 PROCEDURE — 250N000011 HC RX IP 250 OP 636: Performed by: EMERGENCY MEDICINE

## 2024-08-03 PROCEDURE — 85652 RBC SED RATE AUTOMATED: CPT | Performed by: EMERGENCY MEDICINE

## 2024-08-03 PROCEDURE — 80053 COMPREHEN METABOLIC PANEL: CPT | Performed by: EMERGENCY MEDICINE

## 2024-08-03 RX ORDER — PREDNISONE 20 MG/1
20 TABLET ORAL DAILY
Qty: 5 TABLET | Refills: 0 | Status: SHIPPED | OUTPATIENT
Start: 2024-08-03 | End: 2024-08-08

## 2024-08-03 RX ORDER — IBUPROFEN 600 MG/1
600 TABLET, FILM COATED ORAL ONCE
Status: COMPLETED | OUTPATIENT
Start: 2024-08-03 | End: 2024-08-03

## 2024-08-03 RX ORDER — ACETAMINOPHEN 325 MG/1
975 TABLET ORAL ONCE
Status: COMPLETED | OUTPATIENT
Start: 2024-08-03 | End: 2024-08-03

## 2024-08-03 RX ORDER — CYCLOBENZAPRINE HCL 10 MG
10 TABLET ORAL 3 TIMES DAILY PRN
Qty: 20 TABLET | Refills: 0 | Status: SHIPPED | OUTPATIENT
Start: 2024-08-03 | End: 2024-08-09

## 2024-08-03 RX ORDER — LIDOCAINE 4 G/G
1 PATCH TOPICAL ONCE
Status: DISCONTINUED | OUTPATIENT
Start: 2024-08-03 | End: 2024-08-03 | Stop reason: HOSPADM

## 2024-08-03 RX ORDER — LIDOCAINE 50 MG/G
1 PATCH TOPICAL EVERY 24 HOURS
Qty: 10 PATCH | Refills: 0 | Status: SHIPPED | OUTPATIENT
Start: 2024-08-03 | End: 2024-08-13

## 2024-08-03 RX ORDER — IOPAMIDOL 755 MG/ML
90 INJECTION, SOLUTION INTRAVASCULAR ONCE
Status: COMPLETED | OUTPATIENT
Start: 2024-08-03 | End: 2024-08-03

## 2024-08-03 RX ADMIN — LIDOCAINE 1 PATCH: 4 PATCH TOPICAL at 12:38

## 2024-08-03 RX ADMIN — ACETAMINOPHEN 975 MG: 325 TABLET ORAL at 12:38

## 2024-08-03 RX ADMIN — IOPAMIDOL 90 ML: 755 INJECTION, SOLUTION INTRAVENOUS at 13:55

## 2024-08-03 ASSESSMENT — COLUMBIA-SUICIDE SEVERITY RATING SCALE - C-SSRS
6. HAVE YOU EVER DONE ANYTHING, STARTED TO DO ANYTHING, OR PREPARED TO DO ANYTHING TO END YOUR LIFE?: NO
1. IN THE PAST MONTH, HAVE YOU WISHED YOU WERE DEAD OR WISHED YOU COULD GO TO SLEEP AND NOT WAKE UP?: NO
2. HAVE YOU ACTUALLY HAD ANY THOUGHTS OF KILLING YOURSELF IN THE PAST MONTH?: NO

## 2024-08-03 ASSESSMENT — ACTIVITIES OF DAILY LIVING (ADL)
ADLS_ACUITY_SCORE: 35

## 2024-08-03 NOTE — ED TRIAGE NOTES
Patient arrives with upper left side back pain. Patient was sweeping and set the broom down and pain started. Denies numbness or tingling. Denies loss of bowel or bladder control.      Triage Assessment (Adult)       Row Name 08/03/24 1038          Respiratory WDL    Respiratory WDL cough;rhythm/pattern     Rhythm/Pattern, Respiratory shallow  due to pain     Cough Frequency infrequent        Peripheral/Neurovascular WDL    Peripheral Neurovascular WDL WDL        Cognitive/Neuro/Behavioral WDL    Cognitive/Neuro/Behavioral WDL X     Level of Consciousness alert     Arousal Level opens eyes spontaneously     Orientation oriented x 4     Speech clear;spontaneous;logical     Mood/Behavior calm;cooperative

## 2024-08-03 NOTE — DISCHARGE INSTRUCTIONS
You are seen and evaluated here in the emergency department for your back pain.  Fortunately, labs and CT imaging reassuring.  I do feel you likely are having a muscle spasm and recommend Tylenol 1000 mg, lidocaine patch, Flexeril and will give you a short course of steroids as well.  If you develop chest pain, shortness of breath, coughing up blood or other concerns please return to the emergency department.  Otherwise, please closely follow-up with primary care this next week for recheck.

## 2024-08-08 NOTE — TELEPHONE ENCOUNTER
Left Voicemail (2nd Attempt) for the patient to call back and schedule the following:Annual follow up Splenic artery aneurysm.    Location: [facility]  Provider: [facility]  Appointment type: Return Vascular Patient    Appointment mode virtual visit  Return date: October 2024    Specialty phone number: [phone] or  [phone]      Is Imaging Needed: MRA Scan  Imaging Phone Number: [phone]    Additional Notes: Please schedule imaging prior to Provider visit.  Tanvir Xiong on 8/8/2024 at 12:16 PM

## 2024-08-10 NOTE — ED PROVIDER NOTES
EMERGENCY DEPARTMENT ENCOUNTER      NAME: Velma Mcfarlane  AGE: 49 year old adult  YOB: 1975  MRN: [id number]  EVALUATION DATE & TIME: 8/3/2024 12:42 PM    PCP: No Ref-Primary, Physician    ED PROVIDER: Fabiana Paul PA-C      Chief Complaint   Patient presents with    Back Pain         FINAL IMPRESSION:  1. Back pain          MEDICAL DECISION MAKING:    Pertinent Labs & Imaging studies reviewed. (See chart for details)  49 year old adult presents to the Emergency Department for evaluation of back and abdominal pain. The patient developed left upper back pain after twisting to put a broom back. She also had some left upper abdominal pain and was concerned this was a flare of her Crohn's and presented to the ED. On my evaluation, the patient was slightly hypertensive at 144/79 but otherwise vitally stable. Examination with tenderness to the left upper quadrant and left flank without CVA tenderness. No other abdominal tenderness to palpation.  Differential diagnosis included UTI, kidney stone, flare of her Crohn's, bowel obstruction, other intra-abdominal infection, musculoskeletal pain.    CBC was slightly elevated white blood cell count of 11.4 but no other significant derangement.  BMP without derangement.  CRP and ESR negative.  LFTs and lipase without significant derangement.  UA without signs of infection or blood.  Based on symptoms I did feel CT of the abdomen pelvis with contrast was indicated.  Fortunately, this did not show any acute findings.  Pain improved after medications given here.  At this time, I do feel her symptoms are likely musculoskeletal in nature based on history and findings here in the emergency department.  I do not feel she requires admission to the hospital or further workup.  In the emergency department although considered.  I do feel she is appropriate for discharge home with close outpatient follow-up.  I discussed results with patient and plan of care and  she was in agreement understanding.  We discussed return precautions and close follow-up with primary care and she was in agreement understanding.  Will discharge home with medications as detailed below.  All questions were answered best my ability and she was discharged home in stable condition.  Medical Decision Making  Obtained supplemental history:Supplemental history obtained?: No  Reviewed external records: External records reviewed?: No  Care impacted by chronic illness:N/A  Care significantly affected by social determinants of health:Access to Medical Care  Did you consider but not order tests?: Work up considered but not performed and documented in chart, if applicable  Did you interpret images independently?: Independent interpretation of ECG and images noted in documentation, when applicable.  Consultation discussion with other provider:Did you involve another provider (consultant, , pharmacy, etc.)?: No  Discharge. I prescribed additional prescription strength medication(s) as charted. See documentation for any additional details.     ED COURSE:  12:30 PM I met with the patient, obtained history, performed an initial exam, and discussed options and plan for diagnostics and treatment here in the ED.    3:40 PM Patient discharged after being provided with extensive anticipatory guidance and given return precautions, importance of PCP follow-up emphasized.    At the conclusion of the encounter I discussed the results of all of the tests and the disposition. The questions were answered. The patient or family acknowledged understanding and was agreeable with the care plan.     MEDICATIONS GIVEN IN THE EMERGENCY:  Medications   acetaminophen (TYLENOL) tablet 975 mg (975 mg Oral $Given 8/3/24 1238)   ibuprofen (ADVIL/MOTRIN) tablet 600 mg (600 mg Oral Not Given 8/3/24 1238)   iopamidol (ISOVUE-370) solution 90 mL (90 mLs Intravenous $Given 8/3/24 1355)       NEW PRESCRIPTIONS STARTED AT TODAY'S ER  VISIT  Discharge Medication List as of 8/3/2024  4:05 PM        START taking these medications    Details   cyclobenzaprine (FLEXERIL) 10 MG tablet Take 1 tablet (10 mg) by mouth 3 times daily as needed for muscle spasms, Disp-20 tablet, R-0, Local Print      lidocaine (LIDODERM) 5 % patch Place 1 patch onto the skin every 24 hours for 10 daysDisp-10 patch, R-0Local Print      predniSONE (DELTASONE) 20 MG tablet Take 1 tablet (20 mg) by mouth daily for 5 days, Disp-5 tablet, R-0, Local Print                  =================================================================    HPI:    Patient information was obtained from: The patient    Use of Interpretor: N/A      Velma Mcfarlane is a 49 year old adult with a pertinent history of crohn's per patient who presents to this ED via private vehicle for evaluation of abdominal/back pain. The patient developed left upper back pain after twisting to put a broom back. She also had some left upper abdominal pain and was concerned this was a flare of her Crohn's and presented to the ED.  on my evaluation, patient denies any fevers, chills, nausea, vomiting.  No urinary symptoms.  No other abdominal pain.  She did not take any medications prior to arrival.  No other concerns voiced.      REVIEW OF SYSTEMS:  Negative unless otherwise stated in the above HPI.       PAST MEDICAL HISTORY:  No past medical history on file.    PAST SURGICAL HISTORY:  Past Surgical History:   Procedure Laterality Date    CHOLECYSTECTOMY      COLONOSCOPY      COLONOSCOPY N/A 8/12/2019    Procedure: COLONOSCOPY, WITH POLYPECTOMY AND BIOPSY;  Surgeon: Yolanda Molina MD;  Location: [facility] OR    HC REMOVAL GALLBLADDER      Description: Cholecystectomy;  Recorded: 04/06/2011;           CURRENT MEDICATIONS:    No current facility-administered medications for this encounter.    Current Outpatient Medications:     lidocaine (LIDODERM) 5 % patch, Place 1 patch onto the skin every 24 hours for 10 days,  "Disp: 10 patch, Rfl: 0    ACIDOPHILUS LACTOBACILLUS PO, Take 1 capsule by mouth (Patient not taking: Reported on 3/29/2023), Disp: , Rfl:     albuterol (PROAIR HFA/PROVENTIL HFA/VENTOLIN HFA) 108 (90 Base) MCG/ACT inhaler, Inhale 2 puffs into the lungs every 4 hours as needed for shortness of breath, wheezing or cough, Disp: 18 g, Rfl: 0    amitriptyline (ELAVIL) 10 MG tablet, Take 10 mg by mouth, Disp: , Rfl:     butalbital-acetaminophen-caffeine (FIORICET/ESGIC) -40 MG tablet, Take 1 tablet by mouth, Disp: , Rfl:     mesalamine (LIALDA) 1.2 g DR tablet, Take 4 tablets (4,800 mg) by mouth daily, Disp: 360 tablet, Rfl: 3    mesalamine (ROWASA) 4 g enema, Place 1 enema (4 g) rectally at bedtime, Disp: 1800 mL, Rfl: 11    Multiple Vitamins-Iron (MULTIVITAMIN/IRON PO), Take 1 tablet by mouth, Disp: , Rfl:       ALLERGIES:  Allergies   Allergen Reactions    Sulfa Antibiotics Hives     Other reaction(s): *Unknown       FAMILY HISTORY:  Family History   Problem Relation Age of Onset    Diverticulitis Father     Inflammatory Bowel Disease No family hx of     Colon Cancer No family hx of     Coronary Artery Disease Father     Cerebrovascular Disease Maternal Grandfather     Cerebrovascular Disease Paternal Grandfather        SOCIAL HISTORY:   Social History     Socioeconomic History    Marital status:    Tobacco Use    Smoking status: Never    Smokeless tobacco: Never   Substance and Sexual Activity    Alcohol use: Yes     Comment: occasional    Drug use: Never       VITALS:  BP (!) 144/79   Pulse 89   Temp 98  F (36.7  C) (Oral)   Resp 18   Ht 1.702 m (5' 7\")   Wt 56.7 kg (125 lb)   LMP 07/27/2024 (Approximate)   SpO2 94%   BMI 19.58 kg/m       PHYSICAL EXAM    Constitutional: Well developed, Well nourished, NAD  HENT: Normocephalic, Atraumatic, Bilateral external ears normal, Oropharynx normal, mucous membranes moist, Nose normal.   Neck: Normal range of motion, No tenderness, Supple, No " stridor.  Eyes: Eyes track normally throughout exam, Conjunctiva normal, No discharge.   Respiratory: Normal breath sounds, No respiratory distress, No wheezing, Speaks full sentences easily. No cough.  Cardiovascular: Normal heart rate, Regular rhythm, No murmurs, No rubs, No gallops. Chest wall nontender.  GI: Soft, tenderness to the left upper quadrant, No masses, No flank tenderness. No rebound or guarding.  Musculoskeletal: Good range of motion in all major joints.    Integument: Warm, Dry, No erythema, No rash. No petechiae.  Neurologic: Alert & oriented x 3, Normal motor function, Normal sensory function, No focal deficits noted. Normal gait.  Psychiatric: Affect normal, Judgment normal, Mood normal. Cooperative.    LAB:  All pertinent labs reviewed and interpreted.  CBC: WBC 11.4 (h), otherwise WNL  BMP: WNL  CRP: Normal  ESR: normal  LFT: Protein 8.4 (H), otherwise WNL  Lipase: normal  UA: WNL      RADIOLOGY:  Reviewed all pertinent imaging. Please see official radiology report.  CT Abdomen Pelvis w Contrast   Final Result   IMPRESSION:    1.  No acute findings to explain abdominal pain.   2.  Patchy right lower lobe groundglass opacities, likely infectious/inflammatory.          PROCEDURES:   None.    Fabiana Paul PA-C  Emergency Medicine  [facility]  8/3/2024      Fabiana Paul PA-C  08/10/24 0437

## 2024-08-25 ENCOUNTER — APPOINTMENT (OUTPATIENT)
Dept: CT IMAGING | Facility: CLINIC | Age: 49
DRG: 202 | End: 2024-08-25
Attending: STUDENT IN AN ORGANIZED HEALTH CARE EDUCATION/TRAINING PROGRAM
Payer: COMMERCIAL

## 2024-08-25 ENCOUNTER — HOSPITAL ENCOUNTER (INPATIENT)
Facility: CLINIC | Age: 49
LOS: 1 days | Discharge: HOME | DRG: 202 | End: 2024-08-26
Attending: EMERGENCY MEDICINE | Admitting: INTERNAL MEDICINE
Payer: COMMERCIAL

## 2024-08-25 DIAGNOSIS — J45.901 SEVERE ASTHMA WITH EXACERBATION, UNSPECIFIED WHETHER PERSISTENT: ICD-10-CM

## 2024-08-25 DIAGNOSIS — J18.9 PNEUMONIA OF BOTH LUNGS DUE TO INFECTIOUS ORGANISM, UNSPECIFIED PART OF LUNG: ICD-10-CM

## 2024-08-25 DIAGNOSIS — J45.909 ACUTE ASTHMA: ICD-10-CM

## 2024-08-25 PROBLEM — E04.2 NON-TOXIC MULTINODULAR GOITER: Status: ACTIVE | Noted: 2018-10-30

## 2024-08-25 PROBLEM — N28.0 RENAL INFARCTION (H): Status: ACTIVE | Noted: 2018-11-29

## 2024-08-25 PROBLEM — J45.41 MODERATE PERSISTENT ASTHMA WITH EXACERBATION: Chronic | Status: ACTIVE | Noted: 2024-08-25

## 2024-08-25 PROBLEM — I72.8 SPLENIC ARTERY ANEURYSM (H): Status: ACTIVE | Noted: 2018-08-24

## 2024-08-25 PROBLEM — D25.9 UTERINE LEIOMYOMA: Status: ACTIVE | Noted: 2018-11-29

## 2024-08-25 PROBLEM — J96.01 ACUTE RESPIRATORY FAILURE WITH HYPOXIA (H): Status: ACTIVE | Noted: 2024-08-25

## 2024-08-25 PROBLEM — Z87.19 HISTORY OF COLITIS: Status: ACTIVE | Noted: 2024-08-25

## 2024-08-25 PROBLEM — Z87.19 HISTORY OF COLITIS: Chronic | Status: ACTIVE | Noted: 2024-08-25

## 2024-08-25 PROBLEM — J96.01 ACUTE RESPIRATORY FAILURE WITH HYPOXIA (H): Chronic | Status: ACTIVE | Noted: 2024-08-25

## 2024-08-25 PROBLEM — K51.30 ULCERATIVE RECTOSIGMOIDITIS (H): Chronic | Status: ACTIVE | Noted: 2024-08-25

## 2024-08-25 PROBLEM — K51.30 ULCERATIVE RECTOSIGMOIDITIS (H): Status: ACTIVE | Noted: 2024-08-25

## 2024-08-25 PROBLEM — J45.41 MODERATE PERSISTENT ASTHMA WITH EXACERBATION: Status: ACTIVE | Noted: 2024-08-25

## 2024-08-25 LAB
ALBUMIN SERPL BCG-MCNC: 4.7 G/DL (ref 3.5–5.2)
ALP SERPL-CCNC: 81 U/L (ref 40–150)
ALT SERPL W P-5'-P-CCNC: 12 U/L (ref 0–70)
ANION GAP SERPL CALCULATED.3IONS-SCNC: 17 MMOL/L (ref 7–15)
AST SERPL W P-5'-P-CCNC: 17 U/L (ref 0–45)
ATRIAL RATE - MUSE: 91 BPM
BASE EXCESS BLDV CALC-SCNC: -8.6 MMOL/L (ref -3–3)
BASE EXCESS BLDV CALC-SCNC: 0 MMOL/L (ref -3–3)
BASOPHILS # BLD AUTO: 0 10E3/UL (ref 0–0.2)
BASOPHILS NFR BLD AUTO: 0 %
BILIRUB SERPL-MCNC: 0.2 MG/DL
BUN SERPL-MCNC: 14.9 MG/DL (ref 6–20)
CALCIUM SERPL-MCNC: 9.3 MG/DL (ref 8.8–10.4)
CHLORIDE SERPL-SCNC: 103 MMOL/L (ref 98–107)
CREAT SERPL-MCNC: 0.65 MG/DL (ref 0.51–1.17)
D DIMER PPP FEU-MCNC: 0.52 UG/ML FEU (ref 0–0.5)
DIASTOLIC BLOOD PRESSURE - MUSE: 79 MMHG
EGFRCR SERPLBLD CKD-EPI 2021: >90 ML/MIN/1.73M2
EOSINOPHIL # BLD AUTO: 0 10E3/UL (ref 0–0.7)
EOSINOPHIL NFR BLD AUTO: 0 %
ERYTHROCYTE [DISTWIDTH] IN BLOOD BY AUTOMATED COUNT: 14.1 % (ref 10–15)
ERYTHROCYTE [DISTWIDTH] IN BLOOD BY AUTOMATED COUNT: 14.2 % (ref 10–15)
FLUAV RNA SPEC QL NAA+PROBE: NEGATIVE
FLUBV RNA RESP QL NAA+PROBE: NEGATIVE
GLUCOSE SERPL-MCNC: 109 MG/DL (ref 70–99)
HCO3 BLDV-SCNC: 18 MMOL/L (ref 21–28)
HCO3 BLDV-SCNC: 25 MMOL/L (ref 21–28)
HCO3 SERPL-SCNC: 25 MMOL/L (ref 22–29)
HCT VFR BLD AUTO: 37.9 % (ref 35–53)
HCT VFR BLD AUTO: 45.1 % (ref 35–53)
HGB BLD-MCNC: 13.1 G/DL (ref 11.7–17.7)
HGB BLD-MCNC: 15.3 G/DL (ref 11.7–17.7)
HOLD SPECIMEN: NORMAL
IMM GRANULOCYTES # BLD: 0.1 10E3/UL
IMM GRANULOCYTES NFR BLD: 0 %
INTERPRETATION ECG - MUSE: NORMAL
LACTATE SERPL-SCNC: 5.5 MMOL/L (ref 0.7–2)
LYMPHOCYTES # BLD AUTO: 0.7 10E3/UL (ref 0.8–5.3)
LYMPHOCYTES NFR BLD AUTO: 4 %
MCH RBC QN AUTO: 30.5 PG (ref 26.5–33)
MCH RBC QN AUTO: 31.4 PG (ref 26.5–33)
MCHC RBC AUTO-ENTMCNC: 33.9 G/DL (ref 31.5–36.5)
MCHC RBC AUTO-ENTMCNC: 34.6 G/DL (ref 31.5–36.5)
MCV RBC AUTO: 90 FL (ref 78–100)
MCV RBC AUTO: 91 FL (ref 78–100)
MONOCYTES # BLD AUTO: 0.6 10E3/UL (ref 0–1.3)
MONOCYTES NFR BLD AUTO: 3 %
NEUTROPHILS # BLD AUTO: 16.4 10E3/UL (ref 1.6–8.3)
NEUTROPHILS NFR BLD AUTO: 92 %
NRBC # BLD AUTO: 0 10E3/UL
NRBC # BLD AUTO: 0 10E3/UL
NRBC BLD AUTO-RTO: 0 /100
NRBC BLD AUTO-RTO: 0 /100
NT-PROBNP SERPL-MCNC: 81 PG/ML (ref 0–450)
O2/TOTAL GAS SETTING VFR VENT: 63 %
O2/TOTAL GAS SETTING VFR VENT: 80 %
OXYHGB MFR BLDV: 29 % (ref 70–75)
OXYHGB MFR BLDV: 43 % (ref 70–75)
P AXIS - MUSE: 77 DEGREES
PCO2 BLDV: 40 MM HG (ref 40–50)
PCO2 BLDV: 41 MM HG (ref 40–50)
PH BLDV: 7.27 [PH] (ref 7.32–7.43)
PH BLDV: 7.39 [PH] (ref 7.32–7.43)
PLATELET # BLD AUTO: 307 10E3/UL (ref 150–450)
PLATELET # BLD AUTO: 350 10E3/UL (ref 150–450)
PO2 BLDV: 25 MM HG (ref 25–47)
PO2 BLDV: 28 MM HG (ref 25–47)
POTASSIUM SERPL-SCNC: 3.8 MMOL/L (ref 3.4–5.3)
PR INTERVAL - MUSE: 126 MS
PROCALCITONIN SERPL IA-MCNC: 0.03 NG/ML
PROT SERPL-MCNC: 8 G/DL (ref 6.4–8.3)
QRS DURATION - MUSE: 78 MS
QT - MUSE: 382 MS
QTC - MUSE: 469 MS
R AXIS - MUSE: 57 DEGREES
RBC # BLD AUTO: 4.17 10E6/UL (ref 3.8–5.9)
RBC # BLD AUTO: 5.01 10E6/UL (ref 3.8–5.9)
RSV RNA SPEC NAA+PROBE: NEGATIVE
SAO2 % BLDV: 29.6 % (ref 70–75)
SAO2 % BLDV: 43.6 % (ref 70–75)
SARS-COV-2 RNA RESP QL NAA+PROBE: NEGATIVE
SODIUM SERPL-SCNC: 145 MMOL/L (ref 135–145)
SYSTOLIC BLOOD PRESSURE - MUSE: 141 MMHG
T AXIS - MUSE: 60 DEGREES
TROPONIN T SERPL HS-MCNC: <6 NG/L
VENTRICULAR RATE- MUSE: 91 BPM
WBC # BLD AUTO: 15.8 10E3/UL (ref 4–11)
WBC # BLD AUTO: 17.8 10E3/UL (ref 4–11)

## 2024-08-25 PROCEDURE — 99292 CRITICAL CARE ADDL 30 MIN: CPT

## 2024-08-25 PROCEDURE — 250N000011 HC RX IP 250 OP 636: Performed by: INTERNAL MEDICINE

## 2024-08-25 PROCEDURE — 99223 1ST HOSP IP/OBS HIGH 75: CPT | Performed by: INTERNAL MEDICINE

## 2024-08-25 PROCEDURE — 82805 BLOOD GASES W/O2 SATURATION: CPT | Performed by: INTERNAL MEDICINE

## 2024-08-25 PROCEDURE — 258N000003 HC RX IP 258 OP 636: Performed by: STUDENT IN AN ORGANIZED HEALTH CARE EDUCATION/TRAINING PROGRAM

## 2024-08-25 PROCEDURE — 94640 AIRWAY INHALATION TREATMENT: CPT

## 2024-08-25 PROCEDURE — 250N000011 HC RX IP 250 OP 636: Performed by: EMERGENCY MEDICINE

## 2024-08-25 PROCEDURE — 71275 CT ANGIOGRAPHY CHEST: CPT

## 2024-08-25 PROCEDURE — 36415 COLL VENOUS BLD VENIPUNCTURE: CPT | Performed by: EMERGENCY MEDICINE

## 2024-08-25 PROCEDURE — 87637 SARSCOV2&INF A&B&RSV AMP PRB: CPT | Performed by: EMERGENCY MEDICINE

## 2024-08-25 PROCEDURE — 94644 CONT INHLJ TX 1ST HOUR: CPT

## 2024-08-25 PROCEDURE — 82040 ASSAY OF SERUM ALBUMIN: CPT | Performed by: EMERGENCY MEDICINE

## 2024-08-25 PROCEDURE — 84075 ASSAY ALKALINE PHOSPHATASE: CPT | Performed by: INTERNAL MEDICINE

## 2024-08-25 PROCEDURE — 96375 TX/PRO/DX INJ NEW DRUG ADDON: CPT

## 2024-08-25 PROCEDURE — 85007 BL SMEAR W/DIFF WBC COUNT: CPT | Performed by: EMERGENCY MEDICINE

## 2024-08-25 PROCEDURE — 258N000003 HC RX IP 258 OP 636: Performed by: EMERGENCY MEDICINE

## 2024-08-25 PROCEDURE — 272N000272 HC CONTINUOUS NEBULIZER MICRO PUMP

## 2024-08-25 PROCEDURE — 93005 ELECTROCARDIOGRAM TRACING: CPT | Performed by: EMERGENCY MEDICINE

## 2024-08-25 PROCEDURE — 272N000054 HC CANNULA HIGH FLOW, ADULT

## 2024-08-25 PROCEDURE — 250N000009 HC RX 250: Performed by: INTERNAL MEDICINE

## 2024-08-25 PROCEDURE — 96365 THER/PROPH/DIAG IV INF INIT: CPT | Mod: 59

## 2024-08-25 PROCEDURE — 120N000001 HC R&B MED SURG/OB

## 2024-08-25 PROCEDURE — 82805 BLOOD GASES W/O2 SATURATION: CPT | Performed by: EMERGENCY MEDICINE

## 2024-08-25 PROCEDURE — 250N000009 HC RX 250: Performed by: EMERGENCY MEDICINE

## 2024-08-25 PROCEDURE — 250N000011 HC RX IP 250 OP 636: Performed by: STUDENT IN AN ORGANIZED HEALTH CARE EDUCATION/TRAINING PROGRAM

## 2024-08-25 PROCEDURE — 83735 ASSAY OF MAGNESIUM: CPT | Performed by: INTERNAL MEDICINE

## 2024-08-25 PROCEDURE — 99291 CRITICAL CARE FIRST HOUR: CPT

## 2024-08-25 PROCEDURE — 84484 ASSAY OF TROPONIN QUANT: CPT | Performed by: EMERGENCY MEDICINE

## 2024-08-25 PROCEDURE — 36415 COLL VENOUS BLD VENIPUNCTURE: CPT | Performed by: INTERNAL MEDICINE

## 2024-08-25 PROCEDURE — 999N000215 HC STATISTIC HFNC ADULT NON-CPAP

## 2024-08-25 PROCEDURE — 84100 ASSAY OF PHOSPHORUS: CPT | Performed by: INTERNAL MEDICINE

## 2024-08-25 PROCEDURE — 999N000157 HC STATISTIC RCP TIME EA 10 MIN

## 2024-08-25 PROCEDURE — 87633 RESP VIRUS 12-25 TARGETS: CPT | Performed by: INTERNAL MEDICINE

## 2024-08-25 PROCEDURE — 84145 PROCALCITONIN (PCT): CPT | Performed by: INTERNAL MEDICINE

## 2024-08-25 PROCEDURE — 83880 ASSAY OF NATRIURETIC PEPTIDE: CPT | Performed by: EMERGENCY MEDICINE

## 2024-08-25 PROCEDURE — 85025 COMPLETE CBC W/AUTO DIFF WBC: CPT | Performed by: EMERGENCY MEDICINE

## 2024-08-25 PROCEDURE — 96367 TX/PROPH/DG ADDL SEQ IV INF: CPT

## 2024-08-25 PROCEDURE — 999N000185 HC STATISTIC TRANSPORT TIME EA 15 MIN

## 2024-08-25 PROCEDURE — 94645 CONT INHLJ TX EACH ADDL HOUR: CPT

## 2024-08-25 PROCEDURE — 85027 COMPLETE CBC AUTOMATED: CPT | Performed by: INTERNAL MEDICINE

## 2024-08-25 PROCEDURE — 85379 FIBRIN DEGRADATION QUANT: CPT | Performed by: EMERGENCY MEDICINE

## 2024-08-25 RX ORDER — GUAIFENESIN 200 MG/10ML
200 LIQUID ORAL EVERY 4 HOURS PRN
Status: DISCONTINUED | OUTPATIENT
Start: 2024-08-25 | End: 2024-08-26 | Stop reason: HOSPADM

## 2024-08-25 RX ORDER — MESALAMINE 4 G/60ML
4 SUSPENSION RECTAL AT BEDTIME
Status: DISCONTINUED | OUTPATIENT
Start: 2024-08-26 | End: 2024-08-26 | Stop reason: HOSPADM

## 2024-08-25 RX ORDER — IPRATROPIUM BROMIDE AND ALBUTEROL SULFATE 2.5; .5 MG/3ML; MG/3ML
3 SOLUTION RESPIRATORY (INHALATION) ONCE
Status: COMPLETED | OUTPATIENT
Start: 2024-08-25 | End: 2024-08-25

## 2024-08-25 RX ORDER — METHYLPREDNISOLONE SODIUM SUCCINATE 125 MG/2ML
60 INJECTION, POWDER, LYOPHILIZED, FOR SOLUTION INTRAMUSCULAR; INTRAVENOUS EVERY 6 HOURS
Status: DISCONTINUED | OUTPATIENT
Start: 2024-08-26 | End: 2024-08-26 | Stop reason: HOSPADM

## 2024-08-25 RX ORDER — ALBUTEROL SULFATE 5 MG/ML
2.5 SOLUTION RESPIRATORY (INHALATION) ONCE
Status: COMPLETED | OUTPATIENT
Start: 2024-08-25 | End: 2024-08-25

## 2024-08-25 RX ORDER — MAGNESIUM SULFATE HEPTAHYDRATE 40 MG/ML
2 INJECTION, SOLUTION INTRAVENOUS ONCE
Status: COMPLETED | OUTPATIENT
Start: 2024-08-25 | End: 2024-08-25

## 2024-08-25 RX ORDER — METHYLPREDNISOLONE SODIUM SUCCINATE 40 MG/ML
40 INJECTION, POWDER, LYOPHILIZED, FOR SOLUTION INTRAMUSCULAR; INTRAVENOUS EVERY 6 HOURS
Status: DISCONTINUED | OUTPATIENT
Start: 2024-08-25 | End: 2024-08-25

## 2024-08-25 RX ORDER — HEPARIN SODIUM 5000 [USP'U]/.5ML
5000 INJECTION, SOLUTION INTRAVENOUS; SUBCUTANEOUS EVERY 12 HOURS
Status: DISCONTINUED | OUTPATIENT
Start: 2024-08-25 | End: 2024-08-26 | Stop reason: HOSPADM

## 2024-08-25 RX ORDER — GUAIFENESIN/DEXTROMETHORPHAN 100-10MG/5
10 SYRUP ORAL EVERY 4 HOURS PRN
Status: DISCONTINUED | OUTPATIENT
Start: 2024-08-25 | End: 2024-08-25

## 2024-08-25 RX ORDER — HYDROXYZINE HYDROCHLORIDE 10 MG/1
10 TABLET, FILM COATED ORAL 3 TIMES DAILY PRN
Status: DISCONTINUED | OUTPATIENT
Start: 2024-08-25 | End: 2024-08-26

## 2024-08-25 RX ORDER — SODIUM CHLORIDE 9 MG/ML
INJECTION, SOLUTION INTRAVENOUS CONTINUOUS
Status: DISCONTINUED | OUTPATIENT
Start: 2024-08-25 | End: 2024-08-26

## 2024-08-25 RX ORDER — CALCIUM CARBONATE 500 MG/1
1000 TABLET, CHEWABLE ORAL 4 TIMES DAILY PRN
Status: DISCONTINUED | OUTPATIENT
Start: 2024-08-25 | End: 2024-08-26 | Stop reason: HOSPADM

## 2024-08-25 RX ORDER — CEFTRIAXONE 2 G/1
2 INJECTION, POWDER, FOR SOLUTION INTRAMUSCULAR; INTRAVENOUS EVERY 24 HOURS
Status: DISCONTINUED | OUTPATIENT
Start: 2024-08-26 | End: 2024-08-26 | Stop reason: HOSPADM

## 2024-08-25 RX ORDER — ACETAMINOPHEN 650 MG/1
650 SUPPOSITORY RECTAL EVERY 4 HOURS PRN
Status: DISCONTINUED | OUTPATIENT
Start: 2024-08-25 | End: 2024-08-26 | Stop reason: HOSPADM

## 2024-08-25 RX ORDER — AMITRIPTYLINE HYDROCHLORIDE 10 MG/1
10 TABLET ORAL AT BEDTIME
Status: DISCONTINUED | OUTPATIENT
Start: 2024-08-26 | End: 2024-08-26

## 2024-08-25 RX ORDER — METHYLPREDNISOLONE SODIUM SUCCINATE 40 MG/ML
20 INJECTION, POWDER, LYOPHILIZED, FOR SOLUTION INTRAMUSCULAR; INTRAVENOUS ONCE
Status: COMPLETED | OUTPATIENT
Start: 2024-08-25 | End: 2024-08-26

## 2024-08-25 RX ORDER — FLUTICASONE PROPIONATE AND SALMETEROL 250; 50 UG/1; UG/1
1 POWDER RESPIRATORY (INHALATION) EVERY 12 HOURS
COMMUNITY

## 2024-08-25 RX ORDER — FLUTICASONE FUROATE AND VILANTEROL 100; 25 UG/1; UG/1
1 POWDER RESPIRATORY (INHALATION) DAILY
Status: DISCONTINUED | OUTPATIENT
Start: 2024-08-26 | End: 2024-08-26 | Stop reason: HOSPADM

## 2024-08-25 RX ORDER — AMOXICILLIN 250 MG
1 CAPSULE ORAL 2 TIMES DAILY PRN
Status: DISCONTINUED | OUTPATIENT
Start: 2024-08-25 | End: 2024-08-26 | Stop reason: HOSPADM

## 2024-08-25 RX ORDER — OXYCODONE HYDROCHLORIDE 5 MG/1
5 TABLET ORAL EVERY 4 HOURS PRN
Status: DISCONTINUED | OUTPATIENT
Start: 2024-08-25 | End: 2024-08-26

## 2024-08-25 RX ORDER — HYDROXYZINE HYDROCHLORIDE 25 MG/1
25 TABLET, FILM COATED ORAL 3 TIMES DAILY PRN
Status: DISCONTINUED | OUTPATIENT
Start: 2024-08-25 | End: 2024-08-25

## 2024-08-25 RX ORDER — AMOXICILLIN 250 MG
2 CAPSULE ORAL 2 TIMES DAILY PRN
Status: DISCONTINUED | OUTPATIENT
Start: 2024-08-25 | End: 2024-08-26 | Stop reason: HOSPADM

## 2024-08-25 RX ORDER — ALBUTEROL SULFATE 0.83 MG/ML
5 SOLUTION RESPIRATORY (INHALATION)
Status: DISCONTINUED | OUTPATIENT
Start: 2024-08-25 | End: 2024-08-26 | Stop reason: HOSPADM

## 2024-08-25 RX ORDER — AZITHROMYCIN 500 MG/1
500 TABLET, FILM COATED ORAL DAILY
Status: DISCONTINUED | OUTPATIENT
Start: 2024-08-26 | End: 2024-08-26 | Stop reason: HOSPADM

## 2024-08-25 RX ORDER — METHYLPREDNISOLONE SODIUM SUCCINATE 125 MG/2ML
125 INJECTION, POWDER, LYOPHILIZED, FOR SOLUTION INTRAMUSCULAR; INTRAVENOUS ONCE
Status: COMPLETED | OUTPATIENT
Start: 2024-08-25 | End: 2024-08-25

## 2024-08-25 RX ORDER — BUTALBITAL, ACETAMINOPHEN AND CAFFEINE 50; 325; 40 MG/1; MG/1; MG/1
1 TABLET ORAL EVERY 6 HOURS PRN
Status: DISCONTINUED | OUTPATIENT
Start: 2024-08-25 | End: 2024-08-26 | Stop reason: HOSPADM

## 2024-08-25 RX ORDER — ACETAMINOPHEN 325 MG/1
650 TABLET ORAL EVERY 4 HOURS PRN
Status: DISCONTINUED | OUTPATIENT
Start: 2024-08-25 | End: 2024-08-26 | Stop reason: HOSPADM

## 2024-08-25 RX ORDER — LIDOCAINE 40 MG/G
CREAM TOPICAL
Status: DISCONTINUED | OUTPATIENT
Start: 2024-08-25 | End: 2024-08-26 | Stop reason: HOSPADM

## 2024-08-25 RX ORDER — IOPAMIDOL 755 MG/ML
100 INJECTION, SOLUTION INTRAVASCULAR ONCE
Status: COMPLETED | OUTPATIENT
Start: 2024-08-25 | End: 2024-08-25

## 2024-08-25 RX ORDER — AZITHROMYCIN 500 MG/5ML
500 INJECTION, POWDER, LYOPHILIZED, FOR SOLUTION INTRAVENOUS ONCE
Status: COMPLETED | OUTPATIENT
Start: 2024-08-25 | End: 2024-08-25

## 2024-08-25 RX ORDER — MESALAMINE 1.2 G/1
4800 TABLET, DELAYED RELEASE ORAL DAILY
Status: DISCONTINUED | OUTPATIENT
Start: 2024-08-26 | End: 2024-08-26 | Stop reason: HOSPADM

## 2024-08-25 RX ORDER — CEFTRIAXONE 1 G/1
1 INJECTION, POWDER, FOR SOLUTION INTRAMUSCULAR; INTRAVENOUS ONCE
Status: COMPLETED | OUTPATIENT
Start: 2024-08-26 | End: 2024-08-26

## 2024-08-25 RX ORDER — CEFTRIAXONE 1 G/1
1 INJECTION, POWDER, FOR SOLUTION INTRAMUSCULAR; INTRAVENOUS ONCE
Status: COMPLETED | OUTPATIENT
Start: 2024-08-25 | End: 2024-08-25

## 2024-08-25 RX ADMIN — SODIUM CHLORIDE 1000 ML: 9 INJECTION, SOLUTION INTRAVENOUS at 18:35

## 2024-08-25 RX ADMIN — MAGNESIUM SULFATE HEPTAHYDRATE 2 G: 40 INJECTION, SOLUTION INTRAVENOUS at 13:29

## 2024-08-25 RX ADMIN — METHYLPREDNISOLONE SODIUM SUCCINATE 40 MG: 40 INJECTION, POWDER, FOR SOLUTION INTRAMUSCULAR; INTRAVENOUS at 20:38

## 2024-08-25 RX ADMIN — IOPAMIDOL 75 ML: 755 INJECTION, SOLUTION INTRAVENOUS at 17:10

## 2024-08-25 RX ADMIN — SODIUM CHLORIDE 1000 ML: 9 INJECTION, SOLUTION INTRAVENOUS at 14:52

## 2024-08-25 RX ADMIN — Medication 10 MG/HR: at 22:01

## 2024-08-25 RX ADMIN — CEFTRIAXONE 1 G: 1 INJECTION, POWDER, FOR SOLUTION INTRAMUSCULAR; INTRAVENOUS at 18:34

## 2024-08-25 RX ADMIN — METHYLPREDNISOLONE SODIUM SUCCINATE 125 MG: 125 INJECTION, POWDER, FOR SOLUTION INTRAMUSCULAR; INTRAVENOUS at 13:29

## 2024-08-25 RX ADMIN — Medication 10 MG/HR: at 14:50

## 2024-08-25 RX ADMIN — ALBUTEROL SULFATE 2.5 MG: 2.5 SOLUTION RESPIRATORY (INHALATION) at 13:32

## 2024-08-25 RX ADMIN — AZITHROMYCIN MONOHYDRATE 500 MG: 500 INJECTION, POWDER, LYOPHILIZED, FOR SOLUTION INTRAVENOUS at 19:25

## 2024-08-25 RX ADMIN — IPRATROPIUM BROMIDE AND ALBUTEROL SULFATE 3 ML: .5; 3 SOLUTION RESPIRATORY (INHALATION) at 13:32

## 2024-08-25 ASSESSMENT — ACTIVITIES OF DAILY LIVING (ADL)
ADLS_ACUITY_SCORE: 35

## 2024-08-25 ASSESSMENT — COLUMBIA-SUICIDE SEVERITY RATING SCALE - C-SSRS
1. IN THE PAST MONTH, HAVE YOU WISHED YOU WERE DEAD OR WISHED YOU COULD GO TO SLEEP AND NOT WAKE UP?: NO
6. HAVE YOU EVER DONE ANYTHING, STARTED TO DO ANYTHING, OR PREPARED TO DO ANYTHING TO END YOUR LIFE?: NO
2. HAVE YOU ACTUALLY HAD ANY THOUGHTS OF KILLING YOURSELF IN THE PAST MONTH?: NO

## 2024-08-25 NOTE — ED NOTES
Patient accompanied to CT department with primary RN as well as ED RT. Patient switched to a face mask for transport, did remain on cardiac monitor and continuous nebulizer as well. Patient remained stable during the exam.

## 2024-08-25 NOTE — ED TRIAGE NOTES
Patient arrives to the ER with c/o shortness of breath. Patient was 83% RA. Unable to talk in sentences, tachypneic, tripoding in triage.  Roomed, called RT and MD to room. Sats improved on 7L oxymask. Has history of asthma, has been taking her home regimen, but symptoms worsened today, prompting her to come in.      Triage Assessment (Adult)       Row Name 08/25/24 9350          Triage Assessment    Airway WDL WDL        Respiratory WDL    Respiratory WDL X;rhythm/pattern     Rhythm/Pattern, Respiratory shortness of breath;labored;tachypneic        Skin Circulation/Temperature WDL    Skin Circulation/Temperature WDL WDL        Cardiac WDL    Cardiac WDL X  chest tightness        Peripheral/Neurovascular WDL    Peripheral Neurovascular WDL WDL        Cognitive/Neuro/Behavioral WDL    Cognitive/Neuro/Behavioral WDL WDL

## 2024-08-25 NOTE — PROGRESS NOTES
Pt currently placed on HFNC 35L/63%, sating 94%, BS diminished, wheezing, coarse. Pt was SOB, using accessory muscle. Pt started on continue neb at 1450. Pt was on O2 14 LPM Oxymask before she was on HFNC and sating low 90s. Pt reports she can breath better with HFNC. Writer asked pt to call if she feel shaking and heart racing.     Pt HR increased to 124 b/min, pt reports heat racing, MD notified, continued neb was stop at 1739 per MD. RT following.    Sandeep Fuller, RT

## 2024-08-25 NOTE — ED PROVIDER NOTES
EMERGENCY DEPARTMENT ENCOUNTER      NAME: Velma Mcfarlane  AGE: 49 year old adult  YOB: 1975  MRN: [id number]  EVALUATION DATE & TIME: 8/25/2024  1:19 PM    PCP: [facility]    ED PROVIDER: Fabiana Anna M.D.      Chief Complaint   Patient presents with    Chest Pain       FINAL IMPRESSION:  No diagnosis found.    ED COURSE & MEDICAL DECISION MAKING:    Shortness of breath, chest pressure.  Differentials considered include asthma exacerbation, viral illness, pneumonia. Less concern for PE, CHF, ACS given lack of risk factors.  Requiring significant o2 support, initially given 1 duoneb, 1 albuterol neb, methylprednisolone, and magnesium. No improvement and o2 requirement increased to 12L oxymask from 6L oxymask. COVID swab and EKG also ordered.  EKG reviewed by myself and non ischemic.  Therefore, I ordered a continuous nebulization of albuterol, labs, 1L IVF, and signed patient out to Dr. Araiza pending further workup.    1:22 PM I met with the patient to gather history and to perform my initial exam. I discussed the plan for care while in the Emergency Department.  ED Course as of 08/25/24 1557   Sun Aug 25, 2024   1329 EKG reviewed by myself at 1324 and shows sinus rhythm rate 91, Qtc 469, compared to previous EKG 10/9/23, no MANINDER or depression, similar to previous EKG.  I have independently reviewed and interpreted today's EKG, pending Cardiologist read     1419 Patient care signed out to Dr. Araiza pending blood work results, treatment for wheezing/sob, imaging as needed, admission likely     Pertinent Labs & Imaging studies reviewed. (See chart for details).    Medical Decision Making  Obtained supplemental history:Supplemental history obtained?: No  Reviewed external records: External records reviewed?: Documented in chart and Outpatient Record: [facility] ED visit 10/9/2023  GI note from 7/23/24  Care impacted by chronic illness:Other: Asthma  SCAD.  Care significantly  affected by social determinants of health:Access to Medical Care  Did you consider but not order tests?: Work up considered but not performed and documented in chart, if applicable  Did you interpret images independently?: Independent interpretation of ECG and images noted in documentation, when applicable.  Consultation discussion with other provider:Did you involve another provider (consultant, , pharmacy, etc.)?: I discussed the care with another health care provider, see documentation for details.  Admission considered. Patient was signed out to the oncoming physician, disposition pending.  No MIPS measures identified.      At the conclusion of the encounter I discussed the results of all of the tests and the disposition. The questions were answered. The patient or family acknowledged understanding and was agreeable with the care plan.      CRITICAL CARE:  Performed by: Fabiana Anna  Authorized by: Fabiana Anna  Total critical care time: 30 minutes  Critical care time was exclusive of separately billable procedures and treating other patients.  Critical care was necessary to treat or prevent imminent or life-threatening deterioration of the following conditions: acute respiratory failure.  Critical care was time spent personally by me on the following activities: development of treatment plan with patient or surrogate, discussions with consultants, examination of patient, evaluation of patient's response to treatment, obtaining history from patient or surrogate, ordering and performing treatments and interventions, ordering and review of laboratory studies, ordering and review of radiographic studies and re-evaluation of patient's condition, this excludes any separately billable procedures.      HPI    Patient information was obtained from: patient     Use of : N/A      Velma Mcfarlane is a 49 year old adult who presents for asthma exacerbation.     The patient reports 1 week symptoms of  "wheezing, dry cough, and post nasal drip. She associates the cause of her symptoms due to increase in pollen. Today, her symptoms progressively got worse. She also reports having 2-3/10 congestion \"pressure\" in her chest. She drove herself to the ED and was 83% on room air in triage. Patient treats her asthma with advair and albuterol at home.     Patient does not use nebulizer's. No history of smoking. Denies recent sick contacts, sore throat, or fever.     Per Chart Review: [facility] ED visit 10/9/2023 for shortness of breath and cough. Patient hypoxic on room air, with 2 L sats were at 94%. Treated with duo nebs, IV solu medrol. Imaging CTA without evidence of acute pulmonary emboli. Discharged home with prednisone and albuterol.     REVIEW OF SYSTEMS  All other systems negative unless noted in HPI.    PAST MEDICAL HISTORY:  History reviewed. No pertinent past medical history.    PAST SURGICAL HISTORY:  Past Surgical History:   Procedure Laterality Date    CHOLECYSTECTOMY      COLONOSCOPY      COLONOSCOPY N/A 8/12/2019    Procedure: COLONOSCOPY, WITH POLYPECTOMY AND BIOPSY;  Surgeon: Yolanda Molina MD;  Location: [facility] OR    HC REMOVAL GALLBLADDER      Description: Cholecystectomy;  Recorded: 04/06/2011;         CURRENT MEDICATIONS:    Current Facility-Administered Medications   Medication Dose Route Frequency Provider Last Rate Last Admin    albuterol (PROVENTIL) continous nebulization  10 mg/hr Nebulization Continuous Fabiana Anna MD 2 mL/hr at 08/25/24 1450 10 mg/hr at 08/25/24 1450     Current Outpatient Medications   Medication Sig Dispense Refill    albuterol (PROAIR HFA/PROVENTIL HFA/VENTOLIN HFA) 108 (90 Base) MCG/ACT inhaler Inhale 2 puffs into the lungs every 4 hours as needed for shortness of breath, wheezing or cough 18 g 0    amitriptyline (ELAVIL) 25 MG tablet Take 25 mg by mouth at bedtime.      butalbital-acetaminophen-caffeine (FIORICET/ESGIC) -40 MG tablet Take 1 tablet by mouth every 6 " "hours as needed for migraine.      fluticasone-salmeterol (ADVAIR) 250-50 MCG/ACT inhaler Inhale 1 puff into the lungs every 12 hours.      mesalamine (LIALDA) 1.2 g DR tablet Take 4 tablets (4,800 mg) by mouth daily 360 tablet 3    mesalamine (ROWASA) 4 g enema Place 1 enema (4 g) rectally at bedtime 1800 mL 11         ALLERGIES:  Allergies   Allergen Reactions    Sulfa Antibiotics Hives     Other reaction(s): *Unknown       FAMILY HISTORY:  Family History   Problem Relation Age of Onset    Diverticulitis Father     Inflammatory Bowel Disease No family hx of     Colon Cancer No family hx of     Coronary Artery Disease Father     Cerebrovascular Disease Maternal Grandfather     Cerebrovascular Disease Paternal Grandfather        SOCIAL HISTORY:  Social History     Socioeconomic History    Marital status:    Tobacco Use    Smoking status: Never    Smokeless tobacco: Never   Substance and Sexual Activity    Alcohol use: Yes     Comment: occasional    Drug use: Never       VITALS:  Patient Vitals for the past 24 hrs:   BP Pulse Resp SpO2 Height Weight   08/25/24 1543 -- 116 16 94 % -- --   08/25/24 1530 119/65 108 27 98 % -- --   08/25/24 1515 113/62 102 21 98 % -- --   08/25/24 1500 119/63 98 18 97 % -- --   08/25/24 1448 -- -- 16 -- -- --   08/25/24 1445 109/69 101 24 95 % -- --   08/25/24 1430 109/69 104 24 (!) 89 % -- --   08/25/24 1415 125/69 105 22 93 % -- --   08/25/24 1400 126/65 103 22 (!) 89 % -- --   08/25/24 1345 112/64 103 25 92 % -- --   08/25/24 1332 121/72 91 24 92 % -- --   08/25/24 1330 -- 94 (!) 32 93 % -- --   08/25/24 1322 (!) 141/79 97 (!) 40 (!) 83 % 1.702 m (5' 7\") 58.1 kg (128 lb 1.6 oz)       PHYSICAL EXAM    VITAL SIGNS: /65   Pulse 116   Resp 16   Ht 1.702 m (5' 7\")   Wt 58.1 kg (128 lb 1.6 oz)   LMP 08/25/2024 (Approximate)   SpO2 94%   BMI 20.06 kg/m    Physical Exam  Vitals and nursing note reviewed.   Constitutional:       General: She is in acute distress.      " Appearance: She is not toxic-appearing.   HENT:      Head: Normocephalic and atraumatic.   Eyes:      General: No scleral icterus.        Right eye: No discharge.         Left eye: No discharge.   Cardiovascular:      Rate and Rhythm: Regular rhythm. Tachycardia present.      Pulses: Normal pulses.   Pulmonary:      Effort: Respiratory distress present.      Breath sounds: Wheezing and rhonchi present.   Abdominal:      General: There is no distension.      Palpations: Abdomen is soft.      Tenderness: There is no abdominal tenderness.   Musculoskeletal:         General: No swelling or deformity.      Cervical back: Neck supple. No rigidity.      Right lower leg: No edema.      Left lower leg: No edema.   Skin:     General: Skin is warm and dry.      Capillary Refill: Capillary refill takes less than 2 seconds.      Findings: No bruising or erythema.   Neurological:      General: No focal deficit present.      Mental Status: She is alert and oriented to person, place, and time. Mental status is at baseline.   Psychiatric:         Mood and Affect: Mood normal.         Behavior: Behavior normal.         LABS  Labs Ordered and Resulted from Time of ED Arrival to Time of ED Departure   COMPREHENSIVE METABOLIC PANEL - Abnormal       Result Value    Sodium 145      Potassium 3.8      Carbon Dioxide (CO2) 25      Anion Gap 17 (*)     Urea Nitrogen 14.9      Creatinine 0.65      GFR Estimate >90      Calcium 9.3      Chloride 103      Glucose 109 (*)     Alkaline Phosphatase 81      AST 17      ALT 12      Protein Total 8.0      Albumin 4.7      Bilirubin Total 0.2     D DIMER QUANTITATIVE - Abnormal    D-Dimer Quantitative 0.52 (*)    BLOOD GAS VENOUS - Abnormal    pH Venous 7.39      pCO2 Venous 41      pO2 Venous 28      Bicarbonate Venous 25      Base Excess/Deficit Venous 0.0      FIO2 63      Oxyhemoglobin Venous 43 (*)     O2 Sat, Venous 43.6 (*)    CBC WITH PLATELETS AND DIFFERENTIAL - Abnormal    WBC Count 15.8 (*)      RBC Count 5.01      Hemoglobin 15.3      Hematocrit 45.1      MCV 90      MCH 30.5      MCHC 33.9      RDW 14.1      Platelet Count 350      NRBCs per 100 WBC 0      Absolute NRBCs 0.0     INFLUENZA A/B, RSV, & SARS-COV2 PCR - Normal    Influenza A PCR Negative      Influenza B PCR Negative      RSV PCR Negative      SARS CoV2 PCR Negative     TROPONIN T, HIGH SENSITIVITY - Normal    Troponin T, High Sensitivity <6     NT PROBNP INPATIENT - Normal    N terminal Pro BNP Inpatient 81     MANUAL DIFFERENTIAL         RADIOLOGY  CT Chest Pulmonary Embolism w Contrast    (Results Pending)      I have independently reviewed the above image. See radiology report for detail.      EKG:    EKG obtained at 1324 and shows sinus rhythm rate 91, Qtc 469, compared to previous EKG on 10/9/23, similar to previous EKG. I have independently reviewed and interpreted today's EKG, pending Cardiologist read.       PROCEDURES:  N/A      MEDICATIONS GIVEN IN THE EMERGENCY:  Medications   albuterol (PROVENTIL) continous nebulization (10 mg/hr Nebulization $New Bag 8/25/24 1450)   ipratropium - albuterol 0.5 mg/2.5 mg/3 mL (DUONEB) neb solution 3 mL (3 mLs Nebulization $Given 8/25/24 1332)   albuterol (PROVENTIL) neb solution 2.5 mg (2.5 mg Nebulization $Given 8/25/24 1332)   methylPREDNISolone Na Suc (solu-MEDROL) injection 125 mg (125 mg Intravenous $Given 8/25/24 1329)   magnesium sulfate 2 g in 50 mL sterile water intermittent infusion (0 g Intravenous Stopped 8/25/24 1429)   sodium chloride 0.9% BOLUS 1,000 mL (1,000 mLs Intravenous $New Bag 8/25/24 1452)       NEW PRESCRIPTIONS STARTED AT TODAY'S ER VISIT  New Prescriptions    No medications on file        I, Kathia Pringle, am serving as a scribe to document services personally performed by Fabiana Anna MD, based on my observations and the provider's statements to me.  I, Fabiana Anna MD, attest that Kathia Pringle is acting in a scribe capacity, has observed my performance of  the services and has documented them in accordance with my direction.     Fabiana Anna MD  Emergency Medicine  [facility] EMERGENCY ROOM  [address] [place] [address] [phone]  Dept: [phone]             Fabiana Anna MD  08/25/24 3839

## 2024-08-25 NOTE — ED PROVIDER NOTES
EMERGENCY DEPARTMENT SIGN OUT NOTE        ED COURSE AND MEDICAL DECISION MAKING  Patient was signed out to me by Dr Fabiana Anna at 2:19 PM    In brief, Velma Mcfarlane is a 49 year old female who initially presented significant shortness of breath hypoxia.    At time of sign out, disposition was pending results of blood work as well as chest x-ray.    On my initial evaluation of patient she did have significantly reduced aeration bilaterally peak at the base as well as inspiratory expiratory wheezes although she does note subjective improvement in her symptoms since initiating albuterol.  Is currently on facemask oxygen to maintain saturations greater than 90% will transition to high flow nasal cannula for ongoing oxygenation support.    Labs reviewed and interpreted myself.  CBC does show leukocytosis of 15.8.  BMP shows normal electrolytes and a slight anion gap at 17 but a normal bicarb.  Creatinine within normal limits.  Troponin negative at 6.  BNP also unremarkable.  D-dimer is elevated at 0.52 .  COVID and flu negative.      Discussed D-dimer with patient and risks and benefits of CT for more definitive diagnosis of pulmonary embolism versus radiation exposure as she has had several CT scans in the past with patient would prefer to proceed with CT angiography of the chest and this point in time to definitively evaluate for PE.    CT angiography of the chest.  Does not show any signs of pulmonary embolism but does note some multifocal groundglass opacities which is similar to prior CT scan.  This could be infectious versus inflammatory and given her leukocytosis as well as profound oxygen presentation will cover empirically for possible community-acquired pneumonia with ceftriaxone and azithromycin.    Upon serial reevaluations Emergency Department patient's aeration does continue to improve although  and now she is only having occasional expiratory wheezes but still does have ongoing tachycardia.   Suspect this is being driven a degree to continuous albuterol will discontinue continuous albuterol at this point time as patient does note marked improvement in her shortness of breath.    Patient still requiring high flow nasal cannula to maintain her O2 saturations she is on 45 L of flow and 64% FiO2.  Discussed with the hospitalist and patient is appropriate for intermediate level bed at this point in time and plan to admit for ongoing evaluation of asthma exacerbation and hypoxia.      33 minutes of critical care time      ED Course as of 08/25/24 2228   Sun Aug 25, 2024   1329 EKG reviewed by myself at 1324 and shows sinus rhythm rate 91, Qtc 469, compared to previous EKG 10/9/23, no MANINDER or depression, similar to previous EKG.  I have independently reviewed and interpreted today's EKG, pending Cardiologist read     1419 Patient care signed out to Dr. Araiza pending blood work results, treatment for wheezing/sob, imaging as needed, admission likely   1738 On several repeat evaluation patient does have slowly improving aeration but still ongoing inspiratory and expiratory wheezes.  After several hours of continuous nebulizer patient noted subjective improvement in her work of breathing and her lungs do have improved aeration.  Hours developing tachycardia now with rates in the 120s.  Will pause continuous albuterol to see if this helps with her heart rate and continue to monitor respiratory status closely          FINAL IMPRESSION    1. Acute asthma    2. Severe asthma with exacerbation, unspecified whether persistent    3. Pneumonia of both lungs due to infectious organism, unspecified part of lung        ED MEDS  Medications   albuterol (PROVENTIL) neb solution 5 mg ( Nebulization Automatically Held 8/28/24 2200)   cefTRIAXone (ROCEPHIN) 2 g vial to attach to  ml bag for ADULTS or NS 50 ml bag for PEDS (has no administration in time range)   azithromycin (ZITHROMAX) tablet 500 mg (has no administration in  time range)   methylPREDNISolone sodium succinate (SOLU-MEDROL) injection 40 mg (40 mg Intravenous $Given 8/25/24 2038)   albuterol (PROVENTIL) continous nebulization (10 mg/hr Nebulization $New Bag 8/25/24 2201)   lidocaine 1 % 0.1-1 mL (has no administration in time range)   lidocaine (LMX4) cream (has no administration in time range)   sodium chloride (PF) 0.9% PF flush 3 mL (has no administration in time range)   sodium chloride (PF) 0.9% PF flush 3 mL (has no administration in time range)   senna-docusate (SENOKOT-S/PERICOLACE) 8.6-50 MG per tablet 1 tablet (has no administration in time range)     Or   senna-docusate (SENOKOT-S/PERICOLACE) 8.6-50 MG per tablet 2 tablet (has no administration in time range)   calcium carbonate (TUMS) chewable tablet 1,000 mg (has no administration in time range)   heparin ANTICOAGULANT injection 5,000 Units (has no administration in time range)   acetaminophen (TYLENOL) tablet 650 mg (has no administration in time range)     Or   acetaminophen (TYLENOL) Suppository 650 mg (has no administration in time range)   oxyCODONE IR (ROXICODONE) half-tab 2.5 mg (has no administration in time range)   oxyCODONE (ROXICODONE) tablet 5 mg (has no administration in time range)   melatonin tablet 5 mg (has no administration in time range)   ipratropium - albuterol 0.5 mg/2.5 mg/3 mL (DUONEB) neb solution 3 mL (3 mLs Nebulization $Given 8/25/24 1332)   albuterol (PROVENTIL) neb solution 2.5 mg (2.5 mg Nebulization $Given 8/25/24 1332)   methylPREDNISolone Na Suc (solu-MEDROL) injection 125 mg (125 mg Intravenous $Given 8/25/24 1329)   magnesium sulfate 2 g in 50 mL sterile water intermittent infusion (0 g Intravenous Stopped 8/25/24 1429)   sodium chloride 0.9% BOLUS 1,000 mL (0 mLs Intravenous Stopped 8/25/24 1613)   iopamidol (ISOVUE-370) solution 100 mL (75 mLs Intravenous $Given 8/25/24 3579)   cefTRIAXone (ROCEPHIN) 1 g vial to attach to  mL bag for ADULTS or NS 50 mL bag for PEDS  (0 g Intravenous Stopped 8/25/24 1925)   azithromycin (ZITHROMAX) 500 mg in  mL intermittent infusion (0 mg Intravenous Stopped 8/25/24 2047)   sodium chloride 0.9% BOLUS 1,000 mL (0 mLs Intravenous Stopped 8/25/24 2130)       LAB  Labs Ordered and Resulted from Time of ED Arrival to Time of ED Departure   COMPREHENSIVE METABOLIC PANEL - Abnormal       Result Value    Sodium 145      Potassium 3.8      Carbon Dioxide (CO2) 25      Anion Gap 17 (*)     Urea Nitrogen 14.9      Creatinine 0.65      GFR Estimate >90      Calcium 9.3      Chloride 103      Glucose 109 (*)     Alkaline Phosphatase 81      AST 17      ALT 12      Protein Total 8.0      Albumin 4.7      Bilirubin Total 0.2     D DIMER QUANTITATIVE - Abnormal    D-Dimer Quantitative 0.52 (*)    BLOOD GAS VENOUS - Abnormal    pH Venous 7.39      pCO2 Venous 41      pO2 Venous 28      Bicarbonate Venous 25      Base Excess/Deficit Venous 0.0      FIO2 63      Oxyhemoglobin Venous 43 (*)     O2 Sat, Venous 43.6 (*)    CBC WITH PLATELETS AND DIFFERENTIAL - Abnormal    WBC Count 15.8 (*)     RBC Count 5.01      Hemoglobin 15.3      Hematocrit 45.1      MCV 90      MCH 30.5      MCHC 33.9      RDW 14.1      Platelet Count 350      NRBCs per 100 WBC 0      Absolute NRBCs 0.0     BLOOD GAS VENOUS - Abnormal    pH Venous 7.27 (*)     pCO2 Venous 40      pO2 Venous 25      Bicarbonate Venous 18 (*)     Base Excess/Deficit Venous -8.6 (*)     FIO2 80      Oxyhemoglobin Venous 29 (*)     O2 Sat, Venous 29.6 (*)     Lactic Acid 5.5 (*)    INFLUENZA A/B, RSV, & SARS-COV2 PCR - Normal    Influenza A PCR Negative      Influenza B PCR Negative      RSV PCR Negative      SARS CoV2 PCR Negative     TROPONIN T, HIGH SENSITIVITY - Normal    Troponin T, High Sensitivity <6     NT PROBNP INPATIENT - Normal    N terminal Pro BNP Inpatient 81     MANUAL DIFFERENTIAL   BLOOD GAS ARTERIAL       EKG      RADIOLOGY    CT Chest Pulmonary Embolism w Contrast   Final Result    IMPRESSION:   1.  No pulmonary embolism.   2.  Multifocal groundglass opacities bilaterally which overall appears similar to prior examination. This could be secondary to an infectious or inflammatory etiology. Overall, no significant interval change when compared to prior examination.          DISCHARGE MEDS  New Prescriptions    No medications on file       Petar Araiza MD  [facility] EMERGENCY ROOM  [address] [facility] [id number] [phone]      Petar Araiza MD  08/25/24 2228       Petar Araiza MD  08/25/24 2222

## 2024-08-25 NOTE — PHARMACY-ADMISSION MEDICATION HISTORY
Pharmacy Intern Admission Medication History    Admission medication history is complete. The information provided in this note is only as accurate as the sources available at the time of the update.    Information Source(s): Patient and CareEverywhere/SureScripts via in-person    Pertinent information: She uses tablets   Changes made to PTA medication list:  Added: advair inhaler  Deleted: multivitamin, lactobacillus  Changed: amitriptyline 25 mg increase and at bedtime, esgic is prn,     Allergies reviewed with patient and updates made in EHR: yes    Medication History Completed By: Anca Cherry 8/25/2024 2:31 PM    PTA Med List   Medication Sig Last Dose    albuterol (PROAIR HFA/PROVENTIL HFA/VENTOLIN HFA) 108 (90 Base) MCG/ACT inhaler Inhale 2 puffs into the lungs every 4 hours as needed for shortness of breath, wheezing or cough 8/25/2024    amitriptyline (ELAVIL) 25 MG tablet Take 25 mg by mouth at bedtime. 8/23/2024 at PM    butalbital-acetaminophen-caffeine (FIORICET/ESGIC) -40 MG tablet Take 1 tablet by mouth every 6 hours as needed for migraine. Past Month    fluticasone-salmeterol (ADVAIR) 250-50 MCG/ACT inhaler Inhale 1 puff into the lungs every 12 hours. 8/25/2024 at AM    mesalamine (LIALDA) 1.2 g DR tablet Take 4 tablets (4,800 mg) by mouth daily 8/25/2024 at AM    mesalamine (ROWASA) 4 g enema Place 1 enema (4 g) rectally at bedtime 8/24/2024 at PM

## 2024-08-26 ENCOUNTER — HOSPITAL ENCOUNTER (INPATIENT)
Facility: HOSPITAL | Age: 49
LOS: 1 days | Discharge: HOME | DRG: 205 | End: 2024-08-27
Attending: STUDENT IN AN ORGANIZED HEALTH CARE EDUCATION/TRAINING PROGRAM | Admitting: STUDENT IN AN ORGANIZED HEALTH CARE EDUCATION/TRAINING PROGRAM
Payer: COMMERCIAL

## 2024-08-26 VITALS
TEMPERATURE: 98.3 F | HEIGHT: 67 IN | SYSTOLIC BLOOD PRESSURE: 110 MMHG | RESPIRATION RATE: 29 BRPM | BODY MASS INDEX: 20.11 KG/M2 | DIASTOLIC BLOOD PRESSURE: 57 MMHG | WEIGHT: 128.1 LBS | HEART RATE: 113 BPM | OXYGEN SATURATION: 96 %

## 2024-08-26 DIAGNOSIS — J96.01 ACUTE RESPIRATORY FAILURE WITH HYPOXIA (H): Chronic | ICD-10-CM

## 2024-08-26 DIAGNOSIS — J45.901 SEVERE ASTHMA WITH EXACERBATION, UNSPECIFIED WHETHER PERSISTENT: Primary | ICD-10-CM

## 2024-08-26 PROBLEM — J45.909 ACUTE ASTHMA: Status: ACTIVE | Noted: 2024-08-26

## 2024-08-26 PROBLEM — J18.9 PNEUMONIA OF BOTH LUNGS DUE TO INFECTIOUS ORGANISM, UNSPECIFIED PART OF LUNG: Status: ACTIVE | Noted: 2024-08-26

## 2024-08-26 LAB
ALBUMIN SERPL BCG-MCNC: 4 G/DL (ref 3.5–5.2)
ALBUMIN SERPL BCG-MCNC: 4.2 G/DL (ref 3.5–5.2)
ALP SERPL-CCNC: 68 U/L (ref 40–150)
ALP SERPL-CCNC: 70 U/L (ref 40–150)
ALT SERPL W P-5'-P-CCNC: 14 U/L (ref 0–70)
ALT SERPL W P-5'-P-CCNC: 15 U/L (ref 0–70)
ANION GAP SERPL CALCULATED.3IONS-SCNC: 12 MMOL/L (ref 7–15)
ANION GAP SERPL CALCULATED.3IONS-SCNC: 14 MMOL/L (ref 7–15)
AST SERPL W P-5'-P-CCNC: 15 U/L (ref 0–45)
AST SERPL W P-5'-P-CCNC: 20 U/L (ref 0–45)
BASE EXCESS BLDA CALC-SCNC: -7.3 MMOL/L (ref -3–3)
BASE EXCESS BLDV CALC-SCNC: -4 MMOL/L (ref -3–3)
BASOPHILS # BLD MANUAL: 0 10E3/UL (ref 0–0.2)
BASOPHILS NFR BLD MANUAL: 0 %
BILIRUB SERPL-MCNC: 0.2 MG/DL
BILIRUB SERPL-MCNC: <0.2 MG/DL
BUN SERPL-MCNC: 11.5 MG/DL (ref 6–20)
BUN SERPL-MCNC: 8.6 MG/DL (ref 6–20)
C PNEUM DNA SPEC QL NAA+PROBE: NOT DETECTED
CALCIUM SERPL-MCNC: 8.1 MG/DL (ref 8.8–10.4)
CALCIUM SERPL-MCNC: 8.4 MG/DL (ref 8.8–10.4)
CHLORIDE SERPL-SCNC: 111 MMOL/L (ref 98–107)
CHLORIDE SERPL-SCNC: 112 MMOL/L (ref 98–107)
COHGB MFR BLD: 94.5 % (ref 96–97)
CREAT SERPL-MCNC: 0.54 MG/DL (ref 0.51–1.17)
CREAT SERPL-MCNC: 0.59 MG/DL (ref 0.51–1.17)
EGFRCR SERPLBLD CKD-EPI 2021: >90 ML/MIN/1.73M2
EGFRCR SERPLBLD CKD-EPI 2021: >90 ML/MIN/1.73M2
EOSINOPHIL # BLD MANUAL: 2.2 10E3/UL (ref 0–0.7)
EOSINOPHIL NFR BLD MANUAL: 14 %
ERYTHROCYTE [DISTWIDTH] IN BLOOD BY AUTOMATED COUNT: 14.5 % (ref 10–15)
FLUAV H1 2009 PAND RNA SPEC QL NAA+PROBE: NOT DETECTED
FLUAV H1 RNA SPEC QL NAA+PROBE: NOT DETECTED
FLUAV H3 RNA SPEC QL NAA+PROBE: NOT DETECTED
FLUAV RNA SPEC QL NAA+PROBE: NOT DETECTED
FLUBV RNA SPEC QL NAA+PROBE: NOT DETECTED
GLUCOSE SERPL-MCNC: 117 MG/DL (ref 70–99)
GLUCOSE SERPL-MCNC: 153 MG/DL (ref 70–99)
HADV DNA SPEC QL NAA+PROBE: NOT DETECTED
HCO3 BLD-SCNC: 17 MMOL/L (ref 21–28)
HCO3 BLDV-SCNC: 21 MMOL/L (ref 21–28)
HCO3 SERPL-SCNC: 18 MMOL/L (ref 22–29)
HCO3 SERPL-SCNC: 18 MMOL/L (ref 22–29)
HCOV PNL SPEC NAA+PROBE: NOT DETECTED
HCT VFR BLD AUTO: 38 % (ref 35–53)
HGB BLD-MCNC: 12.4 G/DL (ref 11.7–17.7)
HMPV RNA SPEC QL NAA+PROBE: NOT DETECTED
HPIV1 RNA SPEC QL NAA+PROBE: NOT DETECTED
HPIV2 RNA SPEC QL NAA+PROBE: NOT DETECTED
HPIV3 RNA SPEC QL NAA+PROBE: NOT DETECTED
HPIV4 RNA SPEC QL NAA+PROBE: NOT DETECTED
L PNEUMO1 AG UR QL IA: NEGATIVE
LYMPHOCYTES # BLD MANUAL: 2.4 10E3/UL (ref 0.8–5.3)
LYMPHOCYTES NFR BLD MANUAL: 15 %
M PNEUMO DNA SPEC QL NAA+PROBE: NOT DETECTED
MAGNESIUM SERPL-MCNC: 2.1 MG/DL (ref 1.7–2.3)
MCH RBC QN AUTO: 30.3 PG (ref 26.5–33)
MCHC RBC AUTO-ENTMCNC: 32.6 G/DL (ref 31.5–36.5)
MCV RBC AUTO: 93 FL (ref 78–100)
MONOCYTES # BLD MANUAL: 0.9 10E3/UL (ref 0–1.3)
MONOCYTES NFR BLD MANUAL: 6 %
NEUTROPHILS # BLD MANUAL: 10.3 10E3/UL (ref 1.6–8.3)
NEUTROPHILS NFR BLD MANUAL: 65 %
O2/TOTAL GAS SETTING VFR VENT: 21 %
O2/TOTAL GAS SETTING VFR VENT: 50 %
OXYHGB MFR BLDV: 35 % (ref 70–75)
PATH REV: ABNORMAL
PCO2 BLD: 28 MM HG (ref 35–45)
PCO2 BLDV: 36 MM HG (ref 40–50)
PH BLD: 7.41 [PH] (ref 7.35–7.45)
PH BLDV: 7.38 [PH] (ref 7.32–7.43)
PHOSPHATE SERPL-MCNC: 1.5 MG/DL (ref 2.5–4.5)
PHOSPHATE SERPL-MCNC: 3.5 MG/DL (ref 2.5–4.5)
PLAT MORPH BLD: ABNORMAL
PLATELET # BLD AUTO: 315 10E3/UL (ref 150–450)
PO2 BLD: 71 MM HG (ref 80–105)
PO2 BLDV: 25 MM HG (ref 25–47)
POLYCHROMASIA BLD QL SMEAR: SLIGHT
POTASSIUM SERPL-SCNC: 3.3 MMOL/L (ref 3.4–5.3)
POTASSIUM SERPL-SCNC: 4.3 MMOL/L (ref 3.4–5.3)
POTASSIUM SERPL-SCNC: 4.3 MMOL/L (ref 3.4–5.3)
PROT SERPL-MCNC: 7 G/DL (ref 6.4–8.3)
PROT SERPL-MCNC: 7.2 G/DL (ref 6.4–8.3)
RBC # BLD AUTO: 4.09 10E6/UL (ref 3.8–5.9)
RBC MORPH BLD: ABNORMAL
RSV RNA SPEC QL NAA+PROBE: NOT DETECTED
RSV RNA SPEC QL NAA+PROBE: NOT DETECTED
RV+EV RNA SPEC QL NAA+PROBE: NOT DETECTED
S PNEUM AG SPEC QL: NEGATIVE
SAO2 % BLDA: 94 % (ref 92–100)
SAO2 % BLDV: 35.6 % (ref 70–75)
SODIUM SERPL-SCNC: 141 MMOL/L (ref 135–145)
SODIUM SERPL-SCNC: 144 MMOL/L (ref 135–145)
WBC # BLD AUTO: 22.5 10E3/UL (ref 4–11)

## 2024-08-26 PROCEDURE — 272N000054 HC CANNULA HIGH FLOW, ADULT

## 2024-08-26 PROCEDURE — 85027 COMPLETE CBC AUTOMATED: CPT | Performed by: INTERNAL MEDICINE

## 2024-08-26 PROCEDURE — 120N000001 HC R&B MED SURG/OB

## 2024-08-26 PROCEDURE — 250N000013 HC RX MED GY IP 250 OP 250 PS 637: Performed by: INTERNAL MEDICINE

## 2024-08-26 PROCEDURE — 94640 AIRWAY INHALATION TREATMENT: CPT | Mod: 76

## 2024-08-26 PROCEDURE — 94640 AIRWAY INHALATION TREATMENT: CPT

## 2024-08-26 PROCEDURE — 84155 ASSAY OF PROTEIN SERUM: CPT | Performed by: INTERNAL MEDICINE

## 2024-08-26 PROCEDURE — 82805 BLOOD GASES W/O2 SATURATION: CPT | Performed by: INTERNAL MEDICINE

## 2024-08-26 PROCEDURE — 87899 AGENT NOS ASSAY W/OPTIC: CPT | Performed by: INTERNAL MEDICINE

## 2024-08-26 PROCEDURE — 258N000003 HC RX IP 258 OP 636: Performed by: INTERNAL MEDICINE

## 2024-08-26 PROCEDURE — 99223 1ST HOSP IP/OBS HIGH 75: CPT | Performed by: INTERNAL MEDICINE

## 2024-08-26 PROCEDURE — 36415 COLL VENOUS BLD VENIPUNCTURE: CPT | Performed by: INTERNAL MEDICINE

## 2024-08-26 PROCEDURE — 84100 ASSAY OF PHOSPHORUS: CPT | Performed by: INTERNAL MEDICINE

## 2024-08-26 PROCEDURE — 94645 CONT INHLJ TX EACH ADDL HOUR: CPT

## 2024-08-26 PROCEDURE — 82247 BILIRUBIN TOTAL: CPT | Performed by: INTERNAL MEDICINE

## 2024-08-26 PROCEDURE — 94640 AIRWAY INHALATION TREATMENT: CPT | Mod: 26 | Performed by: INTERNAL MEDICINE

## 2024-08-26 PROCEDURE — 250N000011 HC RX IP 250 OP 636: Performed by: INTERNAL MEDICINE

## 2024-08-26 PROCEDURE — 999N000157 HC STATISTIC RCP TIME EA 10 MIN

## 2024-08-26 PROCEDURE — 999N000215 HC STATISTIC HFNC ADULT NON-CPAP

## 2024-08-26 PROCEDURE — 99223 1ST HOSP IP/OBS HIGH 75: CPT | Performed by: STUDENT IN AN ORGANIZED HEALTH CARE EDUCATION/TRAINING PROGRAM

## 2024-08-26 PROCEDURE — 250N000009 HC RX 250: Performed by: INTERNAL MEDICINE

## 2024-08-26 PROCEDURE — 36600 WITHDRAWAL OF ARTERIAL BLOOD: CPT

## 2024-08-26 RX ORDER — POTASSIUM CHLORIDE 1500 MG/1
40 TABLET, EXTENDED RELEASE ORAL ONCE
Status: DISCONTINUED | OUTPATIENT
Start: 2024-08-26 | End: 2024-08-26

## 2024-08-26 RX ORDER — CALCIUM CARBONATE 500 MG/1
1000 TABLET, CHEWABLE ORAL 4 TIMES DAILY PRN
Status: CANCELLED | OUTPATIENT
Start: 2024-08-26

## 2024-08-26 RX ORDER — AMOXICILLIN 250 MG
1 CAPSULE ORAL 2 TIMES DAILY PRN
Status: DISCONTINUED | OUTPATIENT
Start: 2024-08-26 | End: 2024-08-27 | Stop reason: HOSPADM

## 2024-08-26 RX ORDER — POTASSIUM CHLORIDE 7.45 MG/ML
10 INJECTION INTRAVENOUS
Status: COMPLETED | OUTPATIENT
Start: 2024-08-26 | End: 2024-08-26

## 2024-08-26 RX ORDER — BUTALBITAL, ACETAMINOPHEN AND CAFFEINE 50; 325; 40 MG/1; MG/1; MG/1
1 TABLET ORAL EVERY 6 HOURS PRN
Status: CANCELLED | OUTPATIENT
Start: 2024-08-26

## 2024-08-26 RX ORDER — FLUTICASONE FUROATE AND VILANTEROL 100; 25 UG/1; UG/1
1 POWDER RESPIRATORY (INHALATION) DAILY
Status: CANCELLED | OUTPATIENT
Start: 2024-08-26

## 2024-08-26 RX ORDER — ALBUTEROL SULFATE 0.83 MG/ML
5 SOLUTION RESPIRATORY (INHALATION)
Status: DISCONTINUED | OUTPATIENT
Start: 2024-08-26 | End: 2024-08-26

## 2024-08-26 RX ORDER — AMOXICILLIN 250 MG
1 CAPSULE ORAL 2 TIMES DAILY PRN
Status: CANCELLED | OUTPATIENT
Start: 2024-08-26

## 2024-08-26 RX ORDER — METHYLPREDNISOLONE SODIUM SUCCINATE 125 MG/2ML
60 INJECTION, POWDER, LYOPHILIZED, FOR SOLUTION INTRAMUSCULAR; INTRAVENOUS EVERY 6 HOURS
Status: CANCELLED | OUTPATIENT
Start: 2024-08-26

## 2024-08-26 RX ORDER — ALBUTEROL SULFATE 5 MG/ML
2.5 SOLUTION RESPIRATORY (INHALATION) EVERY 4 HOURS
Status: DISCONTINUED | OUTPATIENT
Start: 2024-08-26 | End: 2024-08-27

## 2024-08-26 RX ORDER — ALBUTEROL SULFATE 5 MG/ML
2.5 SOLUTION RESPIRATORY (INHALATION)
Status: CANCELLED | OUTPATIENT
Start: 2024-08-26

## 2024-08-26 RX ORDER — BUTALBITAL, ACETAMINOPHEN AND CAFFEINE 50; 325; 40 MG/1; MG/1; MG/1
1 TABLET ORAL EVERY 6 HOURS PRN
Status: DISCONTINUED | OUTPATIENT
Start: 2024-08-26 | End: 2024-08-27 | Stop reason: HOSPADM

## 2024-08-26 RX ORDER — ACETAMINOPHEN 650 MG/1
650 SUPPOSITORY RECTAL EVERY 4 HOURS PRN
Status: CANCELLED | OUTPATIENT
Start: 2024-08-26

## 2024-08-26 RX ORDER — ALBUTEROL SULFATE 5 MG/ML
2.5 SOLUTION RESPIRATORY (INHALATION)
Status: DISCONTINUED | OUTPATIENT
Start: 2024-08-26 | End: 2024-08-27

## 2024-08-26 RX ORDER — HYDROCODONE BITARTRATE AND ACETAMINOPHEN 5; 325 MG/1; MG/1
1 TABLET ORAL EVERY 4 HOURS PRN
Status: DISCONTINUED | OUTPATIENT
Start: 2024-08-26 | End: 2024-08-26 | Stop reason: HOSPADM

## 2024-08-26 RX ORDER — ACETAMINOPHEN 325 MG/1
650 TABLET ORAL EVERY 4 HOURS PRN
Status: CANCELLED | OUTPATIENT
Start: 2024-08-26

## 2024-08-26 RX ORDER — SODIUM CHLORIDE AND POTASSIUM CHLORIDE 150; 900 MG/100ML; MG/100ML
INJECTION, SOLUTION INTRAVENOUS CONTINUOUS
Status: DISCONTINUED | OUTPATIENT
Start: 2024-08-26 | End: 2024-08-26 | Stop reason: HOSPADM

## 2024-08-26 RX ORDER — LIDOCAINE 40 MG/G
CREAM TOPICAL
Status: DISCONTINUED | OUTPATIENT
Start: 2024-08-26 | End: 2024-08-27 | Stop reason: HOSPADM

## 2024-08-26 RX ORDER — AZITHROMYCIN 250 MG/1
500 TABLET, FILM COATED ORAL DAILY
Status: DISCONTINUED | OUTPATIENT
Start: 2024-08-26 | End: 2024-08-27 | Stop reason: HOSPADM

## 2024-08-26 RX ORDER — POTASSIUM CHLORIDE 7.45 MG/ML
10 INJECTION INTRAVENOUS
Status: DISCONTINUED | OUTPATIENT
Start: 2024-08-26 | End: 2024-08-26 | Stop reason: HOSPADM

## 2024-08-26 RX ORDER — AMOXICILLIN 250 MG
2 CAPSULE ORAL 2 TIMES DAILY PRN
Status: CANCELLED | OUTPATIENT
Start: 2024-08-26

## 2024-08-26 RX ORDER — AMOXICILLIN 250 MG
2 CAPSULE ORAL 2 TIMES DAILY PRN
Status: DISCONTINUED | OUTPATIENT
Start: 2024-08-26 | End: 2024-08-27 | Stop reason: HOSPADM

## 2024-08-26 RX ORDER — METHYLPREDNISOLONE SODIUM SUCCINATE 125 MG/2ML
60 INJECTION, POWDER, LYOPHILIZED, FOR SOLUTION INTRAMUSCULAR; INTRAVENOUS EVERY 24 HOURS
Status: DISCONTINUED | OUTPATIENT
Start: 2024-08-26 | End: 2024-08-26

## 2024-08-26 RX ORDER — AZITHROMYCIN 500 MG/1
500 TABLET, FILM COATED ORAL DAILY
Status: CANCELLED | OUTPATIENT
Start: 2024-08-26 | End: 2024-08-30

## 2024-08-26 RX ORDER — MESALAMINE 1.2 G/1
4800 TABLET, DELAYED RELEASE ORAL DAILY
Status: CANCELLED | OUTPATIENT
Start: 2024-08-26

## 2024-08-26 RX ORDER — ALBUTEROL SULFATE 5 MG/ML
2.5 SOLUTION RESPIRATORY (INHALATION)
Status: DISCONTINUED | OUTPATIENT
Start: 2024-08-26 | End: 2024-08-26 | Stop reason: HOSPADM

## 2024-08-26 RX ORDER — MORPHINE SULFATE 2 MG/ML
2 INJECTION, SOLUTION INTRAMUSCULAR; INTRAVENOUS EVERY 4 HOURS PRN
Status: DISCONTINUED | OUTPATIENT
Start: 2024-08-26 | End: 2024-08-26 | Stop reason: HOSPADM

## 2024-08-26 RX ORDER — ALBUTEROL SULFATE 0.83 MG/ML
5 SOLUTION RESPIRATORY (INHALATION)
Status: CANCELLED | OUTPATIENT
Start: 2024-08-26

## 2024-08-26 RX ORDER — CALCIUM CARBONATE 500 MG/1
1000 TABLET, CHEWABLE ORAL 4 TIMES DAILY PRN
Status: DISCONTINUED | OUTPATIENT
Start: 2024-08-26 | End: 2024-08-27 | Stop reason: HOSPADM

## 2024-08-26 RX ORDER — SODIUM CHLORIDE AND POTASSIUM CHLORIDE 150; 900 MG/100ML; MG/100ML
INJECTION, SOLUTION INTRAVENOUS CONTINUOUS
Status: CANCELLED | OUTPATIENT
Start: 2024-08-26

## 2024-08-26 RX ORDER — SODIUM CHLORIDE AND POTASSIUM CHLORIDE 150; 900 MG/100ML; MG/100ML
INJECTION, SOLUTION INTRAVENOUS CONTINUOUS
Status: DISCONTINUED | OUTPATIENT
Start: 2024-08-26 | End: 2024-08-26

## 2024-08-26 RX ORDER — HEPARIN SODIUM 5000 [USP'U]/.5ML
5000 INJECTION, SOLUTION INTRAVENOUS; SUBCUTANEOUS EVERY 12 HOURS
Status: DISCONTINUED | OUTPATIENT
Start: 2024-08-26 | End: 2024-08-27 | Stop reason: HOSPADM

## 2024-08-26 RX ORDER — ACETAMINOPHEN 325 MG/1
650 TABLET ORAL EVERY 4 HOURS PRN
Status: DISCONTINUED | OUTPATIENT
Start: 2024-08-26 | End: 2024-08-27 | Stop reason: HOSPADM

## 2024-08-26 RX ORDER — MESALAMINE 4 G/60ML
4 SUSPENSION RECTAL AT BEDTIME
Status: DISCONTINUED | OUTPATIENT
Start: 2024-08-26 | End: 2024-08-27 | Stop reason: HOSPADM

## 2024-08-26 RX ORDER — HEPARIN SODIUM 5000 [USP'U]/.5ML
5000 INJECTION, SOLUTION INTRAVENOUS; SUBCUTANEOUS EVERY 12 HOURS
Status: CANCELLED | OUTPATIENT
Start: 2024-08-26

## 2024-08-26 RX ORDER — LIDOCAINE 40 MG/G
CREAM TOPICAL
Status: CANCELLED | OUTPATIENT
Start: 2024-08-26

## 2024-08-26 RX ORDER — CEFTRIAXONE 2 G/1
2 INJECTION, POWDER, FOR SOLUTION INTRAMUSCULAR; INTRAVENOUS EVERY 24 HOURS
Status: DISCONTINUED | OUTPATIENT
Start: 2024-08-26 | End: 2024-08-27

## 2024-08-26 RX ORDER — METHYLPREDNISOLONE SODIUM SUCCINATE 125 MG/2ML
60 INJECTION, POWDER, LYOPHILIZED, FOR SOLUTION INTRAMUSCULAR; INTRAVENOUS EVERY 6 HOURS
Status: DISCONTINUED | OUTPATIENT
Start: 2024-08-26 | End: 2024-08-26

## 2024-08-26 RX ORDER — METHYLPREDNISOLONE SODIUM SUCCINATE 40 MG/ML
40 INJECTION, POWDER, LYOPHILIZED, FOR SOLUTION INTRAMUSCULAR; INTRAVENOUS EVERY 8 HOURS
Status: DISCONTINUED | OUTPATIENT
Start: 2024-08-26 | End: 2024-08-27

## 2024-08-26 RX ORDER — MESALAMINE 1.2 G/1
4800 TABLET, DELAYED RELEASE ORAL DAILY
Status: DISCONTINUED | OUTPATIENT
Start: 2024-08-26 | End: 2024-08-27 | Stop reason: HOSPADM

## 2024-08-26 RX ORDER — POTASSIUM CHLORIDE 7.45 MG/ML
10 INJECTION INTRAVENOUS
Status: CANCELLED | OUTPATIENT
Start: 2024-08-26 | End: 2024-08-26

## 2024-08-26 RX ORDER — MESALAMINE 4 G/60ML
4 SUSPENSION RECTAL AT BEDTIME
Status: CANCELLED | OUTPATIENT
Start: 2024-08-26

## 2024-08-26 RX ORDER — LORAZEPAM 2 MG/ML
0.25 INJECTION INTRAMUSCULAR EVERY 6 HOURS PRN
Status: DISCONTINUED | OUTPATIENT
Start: 2024-08-26 | End: 2024-08-26 | Stop reason: HOSPADM

## 2024-08-26 RX ORDER — ACETAMINOPHEN 650 MG/1
650 SUPPOSITORY RECTAL EVERY 4 HOURS PRN
Status: DISCONTINUED | OUTPATIENT
Start: 2024-08-26 | End: 2024-08-27 | Stop reason: HOSPADM

## 2024-08-26 RX ORDER — ALBUTEROL SULFATE 5 MG/ML
2.5 SOLUTION RESPIRATORY (INHALATION) EVERY 4 HOURS
Status: DISCONTINUED | OUTPATIENT
Start: 2024-08-26 | End: 2024-08-26 | Stop reason: HOSPADM

## 2024-08-26 RX ORDER — FLUTICASONE FUROATE AND VILANTEROL 100; 25 UG/1; UG/1
1 POWDER RESPIRATORY (INHALATION) DAILY
Status: DISCONTINUED | OUTPATIENT
Start: 2024-08-26 | End: 2024-08-27 | Stop reason: HOSPADM

## 2024-08-26 RX ORDER — CEFTRIAXONE 2 G/1
2 INJECTION, POWDER, FOR SOLUTION INTRAMUSCULAR; INTRAVENOUS EVERY 24 HOURS
Status: CANCELLED | OUTPATIENT
Start: 2024-08-26

## 2024-08-26 RX ORDER — ALBUTEROL SULFATE 5 MG/ML
2.5 SOLUTION RESPIRATORY (INHALATION) EVERY 4 HOURS
Status: CANCELLED | OUTPATIENT
Start: 2024-08-26

## 2024-08-26 RX ADMIN — POTASSIUM CHLORIDE 10 MEQ: 7.46 INJECTION, SOLUTION INTRAVENOUS at 06:11

## 2024-08-26 RX ADMIN — ALBUTEROL SULFATE 2.5 MG: 2.5 SOLUTION RESPIRATORY (INHALATION) at 17:44

## 2024-08-26 RX ADMIN — AZITHROMYCIN DIHYDRATE 500 MG: 250 TABLET, FILM COATED ORAL at 20:27

## 2024-08-26 RX ADMIN — METHYLPREDNISOLONE SODIUM SUCCINATE 40 MG: 40 INJECTION INTRAMUSCULAR; INTRAVENOUS at 16:30

## 2024-08-26 RX ADMIN — CEFTRIAXONE SODIUM 2 G: 2 INJECTION, POWDER, FOR SOLUTION INTRAMUSCULAR; INTRAVENOUS at 17:55

## 2024-08-26 RX ADMIN — MESALAMINE 4800 MG: 1.2 TABLET, DELAYED RELEASE ORAL at 07:57

## 2024-08-26 RX ADMIN — POTASSIUM CHLORIDE 10 MEQ: 7.46 INJECTION, SOLUTION INTRAVENOUS at 02:02

## 2024-08-26 RX ADMIN — SODIUM CHLORIDE 500 ML: 9 INJECTION, SOLUTION INTRAVENOUS at 02:30

## 2024-08-26 RX ADMIN — SODIUM PHOSPHATE, MONOBASIC, MONOHYDRATE AND SODIUM PHOSPHATE, DIBASIC, ANHYDROUS 15 MMOL: 142; 276 INJECTION, SOLUTION INTRAVENOUS at 01:42

## 2024-08-26 RX ADMIN — METHYLPREDNISOLONE SODIUM SUCCINATE 62.5 MG: 125 INJECTION, POWDER, FOR SOLUTION INTRAMUSCULAR; INTRAVENOUS at 07:56

## 2024-08-26 RX ADMIN — POTASSIUM CHLORIDE 10 MEQ: 7.46 INJECTION, SOLUTION INTRAVENOUS at 05:01

## 2024-08-26 RX ADMIN — METHYLPREDNISOLONE SODIUM SUCCINATE 20 MG: 40 INJECTION, POWDER, FOR SOLUTION INTRAMUSCULAR; INTRAVENOUS at 01:29

## 2024-08-26 RX ADMIN — ALBUTEROL SULFATE 2.5 MG: 2.5 SOLUTION RESPIRATORY (INHALATION) at 08:13

## 2024-08-26 RX ADMIN — POTASSIUM CHLORIDE AND SODIUM CHLORIDE: 900; 150 INJECTION, SOLUTION INTRAVENOUS at 01:07

## 2024-08-26 RX ADMIN — HEPARIN SODIUM 5000 UNITS: 10000 INJECTION, SOLUTION INTRAVENOUS; SUBCUTANEOUS at 07:57

## 2024-08-26 RX ADMIN — HEPARIN SODIUM 5000 UNITS: 10000 INJECTION, SOLUTION INTRAVENOUS; SUBCUTANEOUS at 20:33

## 2024-08-26 RX ADMIN — FLUTICASONE FUROATE AND VILANTEROL TRIFENATATE 1 PUFF: 100; 25 POWDER RESPIRATORY (INHALATION) at 07:57

## 2024-08-26 RX ADMIN — ALBUTEROL SULFATE 2.5 MG: 2.5 SOLUTION RESPIRATORY (INHALATION) at 04:43

## 2024-08-26 RX ADMIN — ALBUTEROL SULFATE 2.5 MG: 2.5 SOLUTION RESPIRATORY (INHALATION) at 11:31

## 2024-08-26 RX ADMIN — AMITRIPTYLINE HYDROCHLORIDE 12.5 MG: 25 TABLET, FILM COATED ORAL at 20:28

## 2024-08-26 RX ADMIN — CEFTRIAXONE 1 G: 1 INJECTION, POWDER, FOR SOLUTION INTRAMUSCULAR; INTRAVENOUS at 01:42

## 2024-08-26 RX ADMIN — ALBUTEROL SULFATE 2.5 MG: 2.5 SOLUTION RESPIRATORY (INHALATION) at 19:32

## 2024-08-26 ASSESSMENT — ACTIVITIES OF DAILY LIVING (ADL)
ADLS_ACUITY_SCORE: 20
ADLS_ACUITY_SCORE: 25
ADLS_ACUITY_SCORE: 35
DEPENDENT_IADLS:: INDEPENDENT
ADLS_ACUITY_SCORE: 20
ADLS_ACUITY_SCORE: 25
ADLS_ACUITY_SCORE: 20
ADLS_ACUITY_SCORE: 25
ADLS_ACUITY_SCORE: 20
ADLS_ACUITY_SCORE: 25

## 2024-08-26 NOTE — PROGRESS NOTES
Cross cover - MD wants follow up    Sitting upright, comfortable, able to speak full sentences, looked calm while on Hi-Flow.  No abdominal breathing or IC retractions.     History reviewed -- Recent wax vaping, marijuana, coughing and SOB. Known asthmattic, and has ulcerative colitis    A - Acute hypoxic respiratory failure requiring Hi-flow - 40L on 60% FIO2. Triggers for her asthma --- likely from Wax vaping, marijuana use,  and infection, she is known asthmatic.     Plans -   1. Continue to titrate down HiFlow.   2. Revised solumedrol as 60 mgs (from 40 mgs) IV q6  3. Repeat labs - add procalcitonin   4. ID consult - Multifocal gound glass on  CT chest - infectious or inflammatory   5. I did see ICU service was consulted earlier - will put formal consult   6. Cough meds PRN   7. IVF - None was ordered, and she had been NPO   8. Will use Elavil at 12.5 mgs for now -- given above issues. Elavil was just increased to 25 mgs 8/9 --- defer to AM doc safety to resume on her usual dose. She uses this for insomnia.     1AM - K is 3.3 while on continuous albuterol. Will hold this for now, and do q4 and q2 PRN. Will get an ICU bed for her - Condition is tenuous, likely need very close monitoring

## 2024-08-26 NOTE — H&P
[facility]    History and Physical - Hospitalist Service       Date of Admission:  8/25/2024    Assessment & Plan      Velma Mcfarlane is a 49 year old adult history of asthma and ulcerative colitis (Rowasa and mesalamine) admitted on 8/25/2024. She has a history of asthma with 1 hospital admission several years ago she has not been on steroids inhalers at home she has been getting worse over the past week and had a rough day yesterday she presented to the emergency room with hypoxia requiring high flow oxygen, chest x-ray showed some groundglass opacities but similar to previous films, leukocytosis without obvious infection    #1 hypoxic respiratory failure with asthma exacerbation no evident trigger continue with high flow oxygen, Solu-Medrol, albuterol neb, currently no ICU beds at [facility] or situation was discussed with the intensivist covering currently at [facility] (Dr. Kenneth Golden)-[phone]  #2 history of ulcerative colitis not on prednisone or steroids continue same mesalamine and as needed enema  #3 chronic abnormal chest x-ray with groundglass opacities significance questionable        Currently no ICU beds so patient will remain in the emergency room; situation will be monitored by telemetry consultant regarding lab results etc.  Consultative help from Dr. Golden off-site greatly appreciated, as well as ER physician Dr. Araiza and ER RNs            Diet: Combination Diet Regular Diet Adult    DVT Prophylaxis: Heparin SQ  Reyes Catheter: Not present  Lines: None     Cardiac Monitoring: ACTIVE order. Indication: ICU  Code Status: Full Code      Clinically Significant Risk Factors Present on Admission                              # Asthma: noted on problem list              Disposition Plan     Medically Ready for Discharge: Anticipated in 2-4 Days           Vic Rutherford MD  Hospitalist Service  [facility]  Securely message with [employer]  (more info)  Text page via [facility] Paging/Directory     ______________________________________________________________________    Chief Complaint   Shortness of breath    History is obtained from the patient    History of Present Illness   Velma Mcfarlane is a 49 year old adult history of asthma and ulcerative colitis (Rowasa and mesalamine) admitted on 8/25/2024. She has a history of asthma with 1 hospital admission several years ago she has not been on steroids inhalers at home she has been getting worse over the past week and had a rough day yesterday she presented to the emergency room with hypoxia requiring high flow oxygen, chest x-ray showed some groundglass opacities but similar to previous films, leukocytosis without obvious infection    Past Medical History    Past Medical History:   Diagnosis Date    Asthma exacerbation     Non-specific colitis        Past Surgical History   Past Surgical History:   Procedure Laterality Date    CHOLECYSTECTOMY      COLONOSCOPY      COLONOSCOPY N/A 8/12/2019    Procedure: COLONOSCOPY, WITH POLYPECTOMY AND BIOPSY;  Surgeon: Yolanda Molina MD;  Location: [facility] OR    HC REMOVAL GALLBLADDER      Description: Cholecystectomy;  Recorded: 04/06/2011;       Prior to Admission Medications   Prior to Admission Medications   Prescriptions Last Dose Informant Patient Reported? Taking?   albuterol (PROAIR HFA/PROVENTIL HFA/VENTOLIN HFA) 108 (90 Base) MCG/ACT inhaler 8/25/2024  No Yes   Sig: Inhale 2 puffs into the lungs every 4 hours as needed for shortness of breath, wheezing or cough   amitriptyline (ELAVIL) 25 MG tablet 8/23/2024 at PM  Yes Yes   Sig: Take 25 mg by mouth at bedtime.   butalbital-acetaminophen-caffeine (FIORICET/ESGIC) -40 MG tablet Past Month  Yes Yes   Sig: Take 1 tablet by mouth every 6 hours as needed for migraine.   fluticasone-salmeterol (ADVAIR) 250-50 MCG/ACT inhaler 8/25/2024 at AM  Yes Yes   Sig: Inhale 1 puff into the lungs every 12 hours.    mesalamine (LIALDA) 1.2 g DR tablet 8/25/2024 at AM  No Yes   Sig: Take 4 tablets (4,800 mg) by mouth daily   mesalamine (ROWASA) 4 g enema 8/24/2024 at PM  No Yes   Sig: Place 1 enema (4 g) rectally at bedtime      Facility-Administered Medications: None      ------------------------------------------------------------------------  Physical Exam   Vital Signs: Temp: 98  F (36.7  C) Temp src: Oral BP: 126/68 Pulse: 120   Resp: (!) 32 SpO2: 96 % O2 Device: High Flow Nasal Cannula (HFNC) Oxygen Delivery: 45 LPM  Weight: 128 lbs 1.6 oz  She is in significant distress she has trouble speaking her distress is variable she seemed to benefit from the albuterol neb there is some anxiety as expected with her asthmatic attack she does have bilateral expiratory inspiratory wheezes  Heart tachycardic abdomen benign extremities unremarkable neurologic significant anxiety  ----------------------------------------------------------------------------------------------------------------------------------    Medical Decision Making       75 MINUTES SPENT BY ME on the date of service doing chart review, history, exam, documentation & further activities per the note.          Data   Imaging results reviewed over the past 24 hrs:   Recent Results (from the past 24 hour(s))   CT Chest Pulmonary Embolism w Contrast    Narrative    EXAM: CT CHEST PULMONARY EMBOLISM W CONTRAST  LOCATION: [facility]  DATE: 8/25/2024    INDICATION: profound hypoxia, positive dimer, eval for possible PE or pna  COMPARISON: 10/9/2023  TECHNIQUE: CT chest pulmonary angiogram during arterial phase injection of IV contrast. Multiplanar reformats and MIP reconstructions were performed. Dose reduction techniques were used.   CONTRAST: 100 mL Omnipaque 350 intravenous contrast    FINDINGS:  ANGIOGRAM CHEST: Pulmonary arteries are normal caliber and negative for pulmonary emboli. Thoracic aorta is negative for dissection. No CT evidence of  right heart strain.    LUNGS AND PLEURA: Bilateral geographic groundglass opacities, similar to prior examination. No new focal consolidation or effusion.    MEDIASTINUM/AXILLAE: Heart is normal in size. No mediastinal, axillary, or hilar adenopathy.    CORONARY ARTERY CALCIFICATION: None.    UPPER ABDOMEN: Normal.    MUSCULOSKELETAL: Degenerative changes of the spine.      Impression    IMPRESSION:  1.  No pulmonary embolism.  2.  Multifocal groundglass opacities bilaterally which overall appears similar to prior examination. This could be secondary to an infectious or inflammatory etiology. Overall, no significant interval change when compared to prior examination.       Recent Results (from the past 24 hour(s))   Extra Blue Top Tube    Collection Time: 08/25/24  1:28 PM   Result Value Ref Range    Hold Specimen JIC    Extra Red Top Tube    Collection Time: 08/25/24  1:28 PM   Result Value Ref Range    Hold Specimen JIC    Extra Green Top (Lithium Heparin) Tube    Collection Time: 08/25/24  1:28 PM   Result Value Ref Range    Hold Specimen JIC    Extra Purple Top Tube    Collection Time: 08/25/24  1:28 PM   Result Value Ref Range    Hold Specimen     Comprehensive metabolic panel    Collection Time: 08/25/24  1:28 PM   Result Value Ref Range    Sodium 145 135 - 145 mmol/L    Potassium 3.8 3.4 - 5.3 mmol/L    Carbon Dioxide (CO2) 25 22 - 29 mmol/L    Anion Gap 17 (H) 7 - 15 mmol/L    Urea Nitrogen 14.9 6.0 - 20.0 mg/dL    Creatinine 0.65 0.51 - 1.17 mg/dL    GFR Estimate >90 >60 mL/min/1.73m2    Calcium 9.3 8.8 - 10.4 mg/dL    Chloride 103 98 - 107 mmol/L    Glucose 109 (H) 70 - 99 mg/dL    Alkaline Phosphatase 81 40 - 150 U/L    AST 17 0 - 45 U/L    ALT 12 0 - 70 U/L    Protein Total 8.0 6.4 - 8.3 g/dL    Albumin 4.7 3.5 - 5.2 g/dL    Bilirubin Total 0.2 <=1.2 mg/dL   Troponin T, High Sensitivity    Collection Time: 08/25/24  1:28 PM   Result Value Ref Range    Troponin T, High Sensitivity <6 <=22 ng/L   Nt probnp  inpatient    Collection Time: 08/25/24  1:28 PM   Result Value Ref Range    N terminal Pro BNP Inpatient 81 0 - 450 pg/mL   D dimer quantitative    Collection Time: 08/25/24  1:28 PM   Result Value Ref Range    D-Dimer Quantitative 0.52 (H) 0.00 - 0.50 ug/mL FEU   CBC with platelets and differential    Collection Time: 08/25/24  1:28 PM   Result Value Ref Range    WBC Count 15.8 (H) 4.0 - 11.0 10e3/uL    RBC Count 5.01 3.80 - 5.90 10e6/uL    Hemoglobin 15.3 11.7 - 17.7 g/dL    Hematocrit 45.1 35.0 - 53.0 %    MCV 90 78 - 100 fL    MCH 30.5 26.5 - 33.0 pg    MCHC 33.9 31.5 - 36.5 g/dL    RDW 14.1 10.0 - 15.0 %    Platelet Count 350 150 - 450 10e3/uL    NRBCs per 100 WBC 0 <1 /100    Absolute NRBCs 0.0 10e3/uL   Asymptomatic Influenza A/B, RSV, & SARS-CoV2 PCR (COVID-19) Nasopharyngeal    Collection Time: 08/25/24  1:33 PM    Specimen: Nasopharyngeal; Swab   Result Value Ref Range    Influenza A PCR Negative Negative    Influenza B PCR Negative Negative    RSV PCR Negative Negative    SARS CoV2 PCR Negative Negative   Blood gas venous    Collection Time: 08/25/24  2:35 PM   Result Value Ref Range    pH Venous 7.39 7.32 - 7.43    pCO2 Venous 41 40 - 50 mm Hg    pO2 Venous 28 25 - 47 mm Hg    Bicarbonate Venous 25 21 - 28 mmol/L    Base Excess/Deficit Venous 0.0 -3.0 - 3.0 mmol/L    FIO2 63     Oxyhemoglobin Venous 43 (L) 70 - 75 %    O2 Sat, Venous 43.6 (L) 70.0 - 75.0 %

## 2024-08-26 NOTE — DISCHARGE SUMMARY
"[facility] MEDICINE TRANSFER/DISCHARGE SUMMARY        Assessment & Plan      Events leading to transfer - Needs ICU bed for for ICU level of care to manage acute hypoxic respiratory failure and acute severe asthma exacerbation --  We do not have bed available at [facility]. She presented initially at 83% on RA with escalating needs for O2 - and currently at High flow at 40L at 60%. She is tachycardiac at 110s.     1. Acute hypoxic respiratory failure requiring Hi-flow - 40L on 60% FIO2 with acute and severe asthma exacerbation -  Triggers for her asthma --- likely from Wax vaping, marijuana use,  and infection, she is known asthmatic.     2. Ulcerative Colitis on medication    Plans -   1. Continue to Hi-flow  2. Continue steroid, nebs, and antibiotics - modify treatment based on condition   3. ID consult - Multifocal gound glass on  CT chest - infectious or inflammatory   5. ICU consult  6. IVF  7. Electrolyte replacements - her K was low while on prev ordered continuous albuterol neb - and we had to hold this and put her on Albuterol inhaler, Q4 and Q2 PRN -- while replacing   8. Counseled to stop vaping     I did talk to the patient of transfer and need for ICU level of care  I did talk/discuss case with  Hospitalist - Dr Ho, and ICU Dr Saavedra - Patient was accepted       Domenico Delgadillo MD, MPH, FACP, [facility]  Internal Medicine - Hospitalist        Chief Complaint SOB      Haospital stay       - Presented with SOB and cough with chest tight. She has \"adult\" onset asthma. She tried her nebs/inhale at home and did not work.    - She was in the ED since afternoon - multiple interventions done -- including antibiotics, nebs, steroids, Mag to control her asthma. She required 14L FM with O2 sat of 83% on presentation - this escalated to High flow needs    - I was able to ask more info -- and she said, she also was vaping - Marijuana/Wax. No other drugs. No smoking.     - During her stay in ED - she " continue to require continuious nebs, and on Hi-Flow at 40L at 60%. She looked SOB but not tripoding. No abdominal or IC retractions.    - Abdomen is not tender and not vomiting. Her lactic acidosis could be from infection and/or use of B2 agonist    - We have no ICU bed at [facility].        Past Medical History     Past Medical History:   Diagnosis Date    Asthma exacerbation     Non-specific colitis          Surgical History     Past Surgical History:   Procedure Laterality Date    CHOLECYSTECTOMY      COLONOSCOPY      COLONOSCOPY N/A 8/12/2019    Procedure: COLONOSCOPY, WITH POLYPECTOMY AND BIOPSY;  Surgeon: Yolanda Molina MD;  Location: [facility] OR    HC REMOVAL GALLBLADDER      Description: Cholecystectomy;  Recorded: 04/06/2011;        Family History      Family History   Problem Relation Age of Onset    Diverticulitis Father     Inflammatory Bowel Disease No family hx of     Colon Cancer No family hx of     Coronary Artery Disease Father     Cerebrovascular Disease Maternal Grandfather     Cerebrovascular Disease Paternal Grandfather          Social History      .  Social History     Socioeconomic History    Marital status:      Spouse name: Not on file    Number of children: Not on file    Years of education: Not on file    Highest education level: Not on file   Occupational History    Not on file   Tobacco Use    Smoking status: Never    Smokeless tobacco: Never   Substance and Sexual Activity    Alcohol use: Yes     Comment: occasional    Drug use: Never    Sexual activity: Not on file   Other Topics Concern    Parent/sibling w/ CABG, MI or angioplasty before 65F 55M? Not Asked   Social History Narrative    Not on file     Social Determinants of Health     Financial Resource Strain: Not on file   Food Insecurity: Not on file   Transportation Needs: Not on file   Physical Activity: Not on file   Stress: Not on file   Social Connections: Not on file   Interpersonal Safety: Not on file   Housing Stability: Not  on file          Allergies        Allergies   Allergen Reactions    Sulfa Antibiotics Hives     Other reaction(s): *Unknown         Prior to Admission Medications      Current Facility-Administered Medications   Medication Dose Route Frequency Provider Last Rate Last Admin    0.9% sodium chloride + KCl 20 mEq/L infusion   Intravenous Continuous Kandy Delgadillo MD 75 mL/hr at 08/26/24 0107 New Bag at 08/26/24 0107    acetaminophen (TYLENOL) tablet 650 mg  650 mg Oral Q4H PRN Vic Rutherford MD        Or    acetaminophen (TYLENOL) Suppository 650 mg  650 mg Rectal Q4H PRN Vic Rutherford MD        [Held by provider] albuterol (PROVENTIL) continous nebulization  10 mg/hr Nebulization Continuous Vic Rutherford MD 2 mL/hr at 08/26/24 0016 10 mg/hr at 08/26/24 0016    albuterol (PROVENTIL) neb solution 2.5 mg  2.5 mg Nebulization Q2H PRN Kandy Delgadillo MD        albuterol (PROVENTIL) neb solution 2.5 mg  2.5 mg Nebulization Q4H Kandy Delgadillo MD        [Held by provider] albuterol (PROVENTIL) neb solution 5 mg  5 mg Nebulization Q2H While awake Vic Rutherford MD        amitriptyline (ELAVIL) half-tab 12.5 mg  12.5 mg Oral At Bedtime Kandy Delgadillo MD        azithromycin (ZITHROMAX) tablet 500 mg  500 mg Oral Daily Vic Rutherford MD        butalbital-acetaminophen-caffeine (ESGIC) per tablet 1 tablet  1 tablet Oral Q6H PRN Vic Rutherford MD        calcium carbonate (TUMS) chewable tablet 1,000 mg  1,000 mg Oral 4x Daily PRN Vic Rutherford MD        cefTRIAXone (ROCEPHIN) 1 g vial to attach to  mL bag for ADULTS or NS 50 mL bag for PEDS  1 g Intravenous Once Kandy Delgadillo MD        cefTRIAXone (ROCEPHIN) 2 g vial to attach to  ml bag for ADULTS or NS 50 ml bag for PEDS  2 g Intravenous Q24H Vic Rutherford MD        fluticasone-vilanterol (BREO ELLIPTA) 100-25 MCG/ACT inhaler 1 puff  1 puff Inhalation Daily Vic Rutherford MD        guaiFENesin (ROBITUSSIN) 20 mg/mL solution 200  mg  200 mg Oral Q4H PRN Kandy Delgadillo MD        heparin ANTICOAGULANT injection 5,000 Units  5,000 Units Subcutaneous Q12H Vic Rutherford MD        HYDROcodone-acetaminophen (NORCO) 5-325 MG per tablet 1 tablet  1 tablet Oral Q4H PRN Kandy Delgadillo MD        lidocaine (LMX4) cream   Topical Q1H PRN Vic Rutherford MD        lidocaine 1 % 0.1-1 mL  0.1-1 mL Other Q1H PRN Vic Rutherford MD        LORazepam (ATIVAN) injection 0.25 mg  0.25 mg Intravenous Q6H PRN Kandy Delgadillo MD        melatonin tablet 5 mg  5 mg Oral At Bedtime PRN Vic Rutherford MD        mesalamine (LIALDA) DR tablet 4,800 mg  4,800 mg Oral Daily Vic Rutherford MD        mesalamine (ROWASA) enema 4 g  4 g Rectal At Bedtime Vic Rutherford MD        methylPREDNISolone Na Suc (solu-MEDROL) injection 62.5 mg  62.5 mg Intravenous Q6H Kandy Delgadillo MD        morphine (PF) injection 2 mg  2 mg Intravenous Q4H PRN Kandy Delgadillo MD        potassium chloride 10 mEq in 100 mL sterile water infusion  10 mEq Intravenous Q1H Kandy Delgadillo MD        senna-docusate (SENOKOT-S/PERICOLACE) 8.6-50 MG per tablet 1 tablet  1 tablet Oral BID PRN Vic Rutherford MD        Or    senna-docusate (SENOKOT-S/PERICOLACE) 8.6-50 MG per tablet 2 tablet  2 tablet Oral BID PRN Vic Rutherford MD        sodium chloride (PF) 0.9% PF flush 3 mL  3 mL Intracatheter Q8H Vic Rutherford MD        sodium chloride (PF) 0.9% PF flush 3 mL  3 mL Intracatheter q1 min prn Vic Rutherford MD        sodium phosphate 15 mmol in sodium chloride 0.9 % 250 mL intermittent infusion  15 mmol Intravenous Once Kandy Delgadillo MD         Current Outpatient Medications   Medication Sig Dispense Refill    albuterol (PROAIR HFA/PROVENTIL HFA/VENTOLIN HFA) 108 (90 Base) MCG/ACT inhaler Inhale 2 puffs into the lungs every 4 hours as needed for shortness of breath, wheezing or cough 18 g 0    amitriptyline (ELAVIL) 25 MG tablet Take 25 mg by mouth at bedtime.       "butalbital-acetaminophen-caffeine (FIORICET/ESGIC) -40 MG tablet Take 1 tablet by mouth every 6 hours as needed for migraine.      fluticasone-salmeterol (ADVAIR) 250-50 MCG/ACT inhaler Inhale 1 puff into the lungs every 12 hours.      mesalamine (LIALDA) 1.2 g DR tablet Take 4 tablets (4,800 mg) by mouth daily 360 tablet 3    mesalamine (ROWASA) 4 g enema Place 1 enema (4 g) rectally at bedtime 1800 mL 11           Review of Systems     A 12 point comprehensive review of systems was negative except as noted above in HPI.    PHYSICAL EXAMINATION       Vitals      Vitals: /56   Pulse (!) 126   Temp 98  F (36.7  C) (Oral)   Resp (!) 41   Ht 1.702 m (5' 7\")   Wt 58.1 kg (128 lb 1.6 oz)   LMP 08/25/2024 (Approximate)   SpO2 94%   BMI 20.06 kg/m    BMI= Body mass index is 20.06 kg/m .      Examination     General Appearance:  Alert, cooperative, no distress  Head:    Normocephalic, without obvious abnormality, atraumatic  EENT:  PERRL, conjunctiva/corneas clear, EOM's intact.   Neck:   Supple, symmetrical, trachea midline, no adenopathy; no NVE  Back:  Symmetric, no curvature, no CVA tenderness  Chest/Lungs: decreased air entry, (+) wheezing. respirations unlabored, No tenderness or deformity. No abdominal breathing or use of accessory muscles.   Heart:    Regular rate and rhythm, S1 and S2 normal, no murmur, rub   or gallop  Abdomen: Soft, non-tender, bowel sounds active all four quadrants, not peritoneal on palpation. Not distended  Extremities:  Extremities normal, atraumatic, no swelling   Skin:  Skin color, texture, turgor normal, no rashes or lesion  Neurologic:  Awake and alert,  No lateralizing or localizing signs       Pertinent Lab     Results for orders placed or performed during the hospital encounter of 08/25/24   CT Chest Pulmonary Embolism w Contrast    Impression    IMPRESSION:  1.  No pulmonary embolism.  2.  Multifocal groundglass opacities bilaterally which overall appears similar " to prior examination. This could be secondary to an infectious or inflammatory etiology. Overall, no significant interval change when compared to prior examination.   Extra Blue Top Tube   Result Value Ref Range    Hold Specimen JIC    Extra Red Top Tube   Result Value Ref Range    Hold Specimen JIC    Extra Green Top (Lithium Heparin) Tube   Result Value Ref Range    Hold Specimen JIC    Extra Purple Top Tube   Result Value Ref Range    Hold Specimen     Asymptomatic Influenza A/B, RSV, & SARS-CoV2 PCR (COVID-19) Nasopharyngeal    Specimen: Nasopharyngeal; Swab   Result Value Ref Range    Influenza A PCR Negative Negative    Influenza B PCR Negative Negative    RSV PCR Negative Negative    SARS CoV2 PCR Negative Negative   Comprehensive metabolic panel   Result Value Ref Range    Sodium 145 135 - 145 mmol/L    Potassium 3.8 3.4 - 5.3 mmol/L    Carbon Dioxide (CO2) 25 22 - 29 mmol/L    Anion Gap 17 (H) 7 - 15 mmol/L    Urea Nitrogen 14.9 6.0 - 20.0 mg/dL    Creatinine 0.65 0.51 - 1.17 mg/dL    GFR Estimate >90 >60 mL/min/1.73m2    Calcium 9.3 8.8 - 10.4 mg/dL    Chloride 103 98 - 107 mmol/L    Glucose 109 (H) 70 - 99 mg/dL    Alkaline Phosphatase 81 40 - 150 U/L    AST 17 0 - 45 U/L    ALT 12 0 - 70 U/L    Protein Total 8.0 6.4 - 8.3 g/dL    Albumin 4.7 3.5 - 5.2 g/dL    Bilirubin Total 0.2 <=1.2 mg/dL   Result Value Ref Range    Troponin T, High Sensitivity <6 <=22 ng/L   Nt probnp inpatient   Result Value Ref Range    N terminal Pro BNP Inpatient 81 0 - 450 pg/mL   D dimer quantitative   Result Value Ref Range    D-Dimer Quantitative 0.52 (H) 0.00 - 0.50 ug/mL FEU   Blood gas venous   Result Value Ref Range    pH Venous 7.39 7.32 - 7.43    pCO2 Venous 41 40 - 50 mm Hg    pO2 Venous 28 25 - 47 mm Hg    Bicarbonate Venous 25 21 - 28 mmol/L    Base Excess/Deficit Venous 0.0 -3.0 - 3.0 mmol/L    FIO2 63     Oxyhemoglobin Venous 43 (L) 70 - 75 %    O2 Sat, Venous 43.6 (L) 70.0 - 75.0 %   CBC with platelets and  differential   Result Value Ref Range    WBC Count 15.8 (H) 4.0 - 11.0 10e3/uL    RBC Count 5.01 3.80 - 5.90 10e6/uL    Hemoglobin 15.3 11.7 - 17.7 g/dL    Hematocrit 45.1 35.0 - 53.0 %    MCV 90 78 - 100 fL    MCH 30.5 26.5 - 33.0 pg    MCHC 33.9 31.5 - 36.5 g/dL    RDW 14.1 10.0 - 15.0 %    Platelet Count 350 150 - 450 10e3/uL    NRBCs per 100 WBC 0 <1 /100    Absolute NRBCs 0.0 10e3/uL   Blood gas venous   Result Value Ref Range    pH Venous 7.27 (L) 7.32 - 7.43    pCO2 Venous 40 40 - 50 mm Hg    pO2 Venous 25 25 - 47 mm Hg    Bicarbonate Venous 18 (L) 21 - 28 mmol/L    Base Excess/Deficit Venous -8.6 (L) -3.0 - 3.0 mmol/L    FIO2 80     Oxyhemoglobin Venous 29 (L) 70 - 75 %    O2 Sat, Venous 29.6 (L) 70.0 - 75.0 %    Lactic Acid 5.5 (HH) 0.7 - 2.0 mmol/L   Comprehensive metabolic panel   Result Value Ref Range    Sodium 144 135 - 145 mmol/L    Potassium 3.3 (L) 3.4 - 5.3 mmol/L    Carbon Dioxide (CO2) 18 (L) 22 - 29 mmol/L    Anion Gap 14 7 - 15 mmol/L    Urea Nitrogen 11.5 6.0 - 20.0 mg/dL    Creatinine 0.59 0.51 - 1.17 mg/dL    GFR Estimate >90 >60 mL/min/1.73m2    Calcium 8.4 (L) 8.8 - 10.4 mg/dL    Chloride 112 (H) 98 - 107 mmol/L    Glucose 153 (H) 70 - 99 mg/dL    Alkaline Phosphatase 70 40 - 150 U/L    AST 20 0 - 45 U/L    ALT 15 0 - 70 U/L    Protein Total 7.2 6.4 - 8.3 g/dL    Albumin 4.2 3.5 - 5.2 g/dL    Bilirubin Total <0.2 <=1.2 mg/dL   Result Value Ref Range    Magnesium 2.1 1.7 - 2.3 mg/dL   Result Value Ref Range    Procalcitonin 0.03 <0.50 ng/mL   CBC with platelets and differential   Result Value Ref Range    WBC Count 17.8 (H) 4.0 - 11.0 10e3/uL    RBC Count 4.17 3.80 - 5.90 10e6/uL    Hemoglobin 13.1 11.7 - 17.7 g/dL    Hematocrit 37.9 35.0 - 53.0 %    MCV 91 78 - 100 fL    MCH 31.4 26.5 - 33.0 pg    MCHC 34.6 31.5 - 36.5 g/dL    RDW 14.2 10.0 - 15.0 %    Platelet Count 307 150 - 450 10e3/uL    % Neutrophils 92 %    % Lymphocytes 4 %    % Monocytes 3 %    % Eosinophils 0 %    % Basophils 0 %     % Immature Granulocytes 0 %    NRBCs per 100 WBC 0 <1 /100    Absolute Neutrophils 16.4 (H) 1.6 - 8.3 10e3/uL    Absolute Lymphocytes 0.7 (L) 0.8 - 5.3 10e3/uL    Absolute Monocytes 0.6 0.0 - 1.3 10e3/uL    Absolute Eosinophils 0.0 0.0 - 0.7 10e3/uL    Absolute Basophils 0.0 0.0 - 0.2 10e3/uL    Absolute Immature Granulocytes 0.1 <=0.4 10e3/uL    Absolute NRBCs 0.0 10e3/uL   Phosphorus Lab   Result Value Ref Range    Phosphorus 1.5 (L) 2.5 - 4.5 mg/dL   ECG 12-LEAD WITH MUSE (LHE)   Result Value Ref Range    Systolic Blood Pressure 141 mmHg    Diastolic Blood Pressure 79 mmHg    Ventricular Rate 91 BPM    Atrial Rate 91 BPM    LA Interval 126 ms    QRS Duration 78 ms     ms    QTc 469 ms    P Axis 77 degrees    R AXIS 57 degrees    T Axis 60 degrees    Interpretation ECG       Sinus rhythm  Normal ECG  When compared with ECG of 09-Oct-2023 12:27,  Fusion complexes are no longer Present  Confirmed by SEE ED PROVIDER NOTE FOR, ECG INTERPRETATION ([id number]),  Corrine Reza ([id number]) on 8/25/2024 11:33:16 PM             Pertinent Radiology

## 2024-08-26 NOTE — PROGRESS NOTES
"/56   Pulse 117   Temp 98  F (36.7  C) (Oral)   Resp 25   Ht 1.702 m (5' 7\")   Wt 58.1 kg (128 lb 1.6 oz)   LMP 08/25/2024 (Approximate)   SpO2 95%   BMI 20.06 kg/m        The PT had been established on the HFNC and a continuous Albuterol neb on day shift. The continuous neb had been Dc'd a couple hours before I arrived because of a HR >120.    When I arrived in her room for my first rounds, the PT was tripod breathing sitting up on at the side of the bed. She could only speak in strings of two to three words. She had taken the HFNC prongs out of her nose declaring that they made her felt \"claustrophobic\" and that it was \"hot.\" On RA, SATs of 86% to 88%. She agreed to an OXYMASK (15 liters with SATs in the low 90's). Upon ausculation, the PT was not wheezing. Rather, I heard diffuse crackles, but overall good air movement.    The MD ordered an ABG and reordered the continuous neb. I was unable to get an ABG, and a VBG was drawn instead. This revealed a \"new\" metabolic acidosis. Of note, the PT PCO2 of 40 needs to be understood in the context of someone breathing well into the high 30's at the time of the draw (more c/w of a lower expected PCO2 all things being \"normal\").     Both BIPAP and sedation were discussed, but contraindications for either made us hold off for the VBG results. Neither in the end were used, with the PT degree of respiratory distress slowly abating without either. She was speaking in full sentences within an hour (but just barely), and with even less effort within 2 hours. Regardless, I would never characterize her as \"out\" of respiratory distress; rather, in a lesser degree of it. She agreed to go back on the HFNC within an hour.    The continuous neb was eventually restarted, but a follow up lab draw showed K+ changes. Again, it was Dc'd. After being off the neb for ~2 hours, I observed no change in the PT's WOB or BS. Further, she reported that she felt no difference in her " breathing without the continues Albuterol.    The PT will be transferred to the [facility] ICU. I gave report to the charge RT at [facility].

## 2024-08-26 NOTE — INTERIM SUMMARY
I saw and examined Ms. Joie Mcfarlane in the ICU today.  She has been titrated down to high flow nasal cannula at FiO2 of 35 and 40 L/min.  She is feeling better this morning and less tight.  She feels as though her cough has improved.  Will continue to titrate as able.  Will continue nebs every 4 hours for now.    General Appearance: Awake, alert, in no acute distress  Respiratory: diminished bilaterally  Cardiovascular: regular, tachycardia  GI: soft, nontender, non distended, normal bowel sounds  Skin: no jaundice, no rash      I agree with the assessment and plan in the history and physical by Dr. Berg unless stated above.

## 2024-08-26 NOTE — PROGRESS NOTES
"/56   Pulse (!) 126   Temp 98  F (36.7  C) (Oral)   Resp (!) 41   Ht 1.702 m (5' 7\")   Wt 58.1 kg (128 lb 1.6 oz)   LMP 08/25/2024 (Approximate)   SpO2 94%   BMI 20.06 kg/m        The PT was provided a neb per MD order. BS were slightly coarse both pre and post neb (and he had a frequent loose cough), though he reported that it \"helped.\" RT will follow as directed.  "

## 2024-08-26 NOTE — ED NOTES
"ED Triage Provider Note  [facility] EMERGENCY ROOM  Encounter Date: Aug 25, 2024    History:  Chief Complaint   Patient presents with    Chest Pain     Velma Mcfarlane is a 49 year old adult who presents to the ED with ***. {include 1-3 HPI elements}    Review of Systems:  {1 item required}    Exam:  /56   Pulse 116   Temp 98  F (36.7  C) (Oral)   Resp 22   Ht 1.702 m (5' 7\")   Wt 58.1 kg (128 lb 1.6 oz)   LMP 08/25/2024 (Approximate)   SpO2 96%   BMI 20.06 kg/m    General: No acute distress. Appears stated age.   Cardio: {normal/tachy/lenore:445628::\"normal\"} rate, extremities well perfused  Resp: Normal work of breathing, grossly normal respiratory rate  Neuro: Alert. Facial movement grossly symmetric. Grossly intact strength.   {edit exam as needed, may add problem pertinent system}    Medical Decision Making:  Patient arriving to the ED with problem as above. A medical screening exam was performed. {Initial differential:845349::\"Initial differential diagnosis includes but not limited to ***.\"}    *** orders initiated from Triage. The patient is most appropriate to {ED Triage Destination:044259}.       Fabiana Sue RN  8/26/2024 at 12:00 AM  "

## 2024-08-26 NOTE — CONSULTS
Care Management Initial Consult    General Information  Assessment completed with: PatientVelma  Type of CM/SW Visit: Initial Assessment    Primary Care Provider verified and updated as needed: Yes   Readmission within the last 30 days: no previous admission in last 30 days         Advance Care Planning: Advance Care Planning Reviewed: other (see comments) (No ACP documents)          Communication Assessment  Patient's communication style: spoken language (English or Bilingual)    Hearing Difficulty or Deaf: no   Wear Glasses or Blind: yes    Cognitive  Cognitive/Neuro/Behavioral: WDL                      Living Environment:   People in home: spouse  Riaz  Current living Arrangements: house      Able to return to prior arrangements: yes       Family/Social Support:  Care provided by: self  Provides care for: no one  Marital Status:     Riaz       Description of Support System: Supportive         Current Resources:   Patient receiving home care services: No     Community Resources: None  Equipment currently used at home: none  Supplies currently used at home: None    Employment/Financial:  Employment Status: employed full-time        Financial Concerns:             Does the patient's insurance plan have a 3 day qualifying hospital stay waiver?  No    Lifestyle & Psychosocial Needs:  Social Determinants of Health     Food Insecurity: Low Risk  (8/26/2024)    Food Insecurity     Within the past 12 months, did you worry that your food would run out before you got money to buy more?: No     Within the past 12 months, did the food you bought just not last and you didn t have money to get more?: No   Depression: Not at risk (5/14/2024)    PHQ-2     PHQ-2 Score: 0   Housing Stability: Low Risk  (8/26/2024)    Housing Stability     Do you have housing? : Yes     Are you worried about losing your housing?: No   Tobacco Use: Low Risk  (8/25/2024)    Patient History     Smoking Tobacco Use: Never     Smokeless  Tobacco Use: Never     Passive Exposure: Not on file   Financial Resource Strain: Low Risk  (8/26/2024)    Financial Resource Strain     Within the past 12 months, have you or your family members you live with been unable to get utilities (heat, electricity) when it was really needed?: No   Alcohol Use: Not on file   Transportation Needs: Low Risk  (8/26/2024)    Transportation Needs     Within the past 12 months, has lack of transportation kept you from medical appointments, getting your medicines, non-medical meetings or appointments, work, or from getting things that you need?: No   Physical Activity: Not on file   Interpersonal Safety: Low Risk  (8/26/2024)    Interpersonal Safety     Do you feel physically and emotionally safe where you currently live?: Yes     Within the past 12 months, have you been hit, slapped, kicked or otherwise physically hurt by someone?: No     Within the past 12 months, have you been humiliated or emotionally abused in other ways by your partner or ex-partner?: No   Recent Concern: Interpersonal Safety - High Risk (8/26/2024)    Interpersonal Safety     Do you feel physically and emotionally safe where you currently live?: No     Within the past 12 months, have you been hit, slapped, kicked or otherwise physically hurt by someone?: No     Within the past 12 months, have you been humiliated or emotionally abused in other ways by your partner or ex-partner?: No   Stress: Not on file   Social Connections: Not on file   Health Literacy: Not on file       Functional Status:  Prior to admission patient needed assistance:   Dependent ADLs:: Independent  Dependent IADLs:: Independent       Mental Health Status:  Mental Health Status: No Current Concerns       Chemical Dependency Status:  Chemical Dependency Status: No Current Concerns             Values/Beliefs:  Spiritual, Cultural Beliefs, Christianity Practices, Values that affect care:                 Additional Information:  Assessed. RNAQUILES  introduced self and CM role to pt and her . Pt currently on HFNC. Pt lives with spouse Riaz in a house. Pt Ind with ADLS and IADLS. No home care svcs prior. No mobility aides.Works full time. Has an established PCP. No ACP documents. Pt anticipates returning home with spouse and feels that she will not have CM needs. Pt is not on oxygen at baseline. Spouse to transport.       No therapy consults placed at this time.       Cm will continue to follow plan of care,review recommendations,and assist with any discharge need anticipated.       Laurel Salter RN

## 2024-08-26 NOTE — PROGRESS NOTES
Pulmonary brief update    Reviewed initial note by Dr. Saavedra.  On HFNC 0.35, 40Lpm  Feeling much better.   Increase steroids to 40mg IV q8h  Continue abx for now  Trial on low flow cannula if she remains stable    We'll continue to follow.    Mehdi (Jonathan) MD Aletha  [facility] Pulmonary & Critical Care ([facility])  Send me a secure message using [employer]  Clinic [phone]  Fax [phone]

## 2024-08-26 NOTE — CONSULTS
PULMONARY MEDICINE CONSULT  8/26/2024    CODE: Full Code    Reason for Consult: acute hypoxic respiratory failure    Assessment/Plan:   49 year old woman with most pertinent history of mild asthma & recent vaping, admitted 8/26/2024 w/ acute hypoxic respiratory failure in the setting of 1 week of progressively worsening dyspnea.     #. Acute hypoxic respiratory failure associated w/ bilateral groundglass opacities suggestive of EVALI.  #. Asthma exacerbation, likely d/t vaping. Physical evaluation w/o classic signs such as wheezing or poor air movement, but understandably, patient has received several asthma-specific treatments at [facility].     Recommendations:  Goal SpO2 >/= 90%, wean off supplemental oxygen as able, avoid hyperoxia  Continue w/ HFNC & try to wean down FiO2 & subsequently the flow rate  For suspected EVALI, will start w/ MP 60 mg IV daily (close to 1 mg/kg/day), which would also provide adequate coverage for an asthma exacerbation  Corticosteroid taper & duration will depend on patient's clinical course -- TBD  Current respiratory status is not conducive for a diagnostic bronchoscopy, but we can reassess over the next 12-24 hours  Continue PTA Advair, OK w/ albuterol neb q4H for now, but if she continues to improve, can start spacing this out  Agree w/ urine antigen testing, follow-up RVP results (collected at [facility])  Reasonable to continue empiric coverage for CAP  Re-check PCT in 24 hours & if it remains negative, then could potentially stop empiric antibiotics  If decided to complete empiric CAP abx course, reasonable to do a 5-day course of azithromycin  She will need a follow up in the pulmonary clinic in  ~4 weeks w/ non-contrast CT chest to monitor for GGO resolution    Thank you for this consult. Pulmonary will follow along with you.    Eloisa Saavedra MD  Pulmonary and Critical Care     HPI:   HISTORY OF PRESENTING ILLNESS:  Velma Mcfarlane is a 49 year old woman with history  of UC on mesalamine & baseline mild asthma, admitted to [facility] on 8/26/2024 with acute hypoxic respiratory failure in setting of an acute asthma exacerbation w/ possible vape-induced pneumonitis.     Patient reports being sick for about a week w/ exertional dyspnea that has been progressively worsening to the point that she had difficulties talking at work on Friday. She has been using her PRN albuterol several times per day for the last week; initially it helped temporarily, but in the 2-3 days before her coming to the ED, it was not doing anything for her breathing. She came to the ED at [facility] & was hypoxic to low-80s in triage. In the ED her supplemental oxygen needs progressively escalated & she was eventually started on HFNC for work of breathing. She was briefly on continuous albuterol neb, which made her quite tremulous & tachycardic; it was stopped d/t development of lactic acidosis & hypokalemia. She is feeling that her breathing is significantly improved since her arrival to the ED. She is concerned that her current respiratory difficulty is due to her recent smoking rather than an asthma exacerbation.     She is currently working and going to school. It has been quite stressful and until the end of July she has been smoking marijuana to help. She has been abstaining for the last month, but when she went on vacation last week, she tried wax vaping several times. No recent issues concerning for an acute infection.     REVIEW OF SYSTEMS: 12-point ROS was negative with exceptions as detailed in the HPI.    MEDICAL HISTORY:  has a past medical history of Asthma exacerbation and Non-specific colitis.    She has no past medical history of Cerebral artery occlusion with cerebral infarction (H), Diabetes (H), Hypertension, PONV (postoperative nausea and vomiting), Seizures (H), or Sleep apnea.  SURGICAL HISTORY:  has a past surgical history that includes Cholecystectomy; colonoscopy; Colonoscopy  (N/A, 8/12/2019); and REMOVAL GALLBLADDER.  SOCIAL HISTORY:  reports that she has never smoked. She has never used smokeless tobacco. She reports current alcohol use. She reports that she does not use drugs.  FAMILY HISTORY: family history includes Cerebrovascular Disease in her maternal grandfather and paternal grandfather; Coronary Artery Disease in her father; Diverticulitis in her father.  MEDICATIONS: personally reviewed, including EMR/Care Everywhere. Pertinent information noted & updated.     ALLERGIES:   Allergies   Allergen Reactions    Sulfa Antibiotics Hives     Other reaction(s): *Unknown       Vitals: Temp:  [98  F (36.7  C)-98.3  F (36.8  C)] 98.3  F (36.8  C)  Pulse:  [] 113  Resp:  [16-49] 29  BP: ()/(55-80) 110/57  FiO2 (%):  [60 %-65 %] 65 %  SpO2:  [83 %-99 %] 96 %  Physical Exam:   General: pleasant woman, mild on HFNC, talking in full sentences  HEENT: EOMI, DMM  CV: tachycardic; extremities well perfused  Pulm: equal b/l breath sounds, no wheezing, no rhonchi, inspiratory crackles in the anterior left lung, no cough; minimal accessory muscle at rest, more notable w/ longer sentences  Abd: soft, ND, quiet bowel sounds  Msk: warm to touch, no peripheral edema  Derm: no acute lesions or rashes on limited exam  Neuro: awake, alert, attentive; moves extremities w/o focal deficit  Psych: calm    Labs: personally reviewed & interpreted in EMR.   Imaging: personally reviewed & interpreted, including formal Radiology report in the EMR.    Total time of 55 minutes was devoted to the consult with at least 50% of the time spent in direct patient interaction, education, and coordination of patient's care.    This report was prepared using speech recognition software.  Any typographical errors are unintentional.

## 2024-08-26 NOTE — ED NOTES
Called report to KAYLA Shaw RN she would like for the Hospitalist to be contacted to ensure he is ok with pt being on IMC d/t his previous note stating she would stay in ER.  Provider notified, Charge nurse notified

## 2024-08-26 NOTE — PLAN OF CARE
Goal Outcome Evaluation:      Plan of Care Reviewed With: patient, spouse    Overall Patient Progress: improving    Outcome Evaluation: Weaning HFNC, advancing diet, downgrade to Med-tele    [facility] - ICU    RN Progress Note:            Pertinent Assessments:      Please refer to flowsheet rows for full assessment     Diminished lung sounds, tachycardic at rest, HR in low 100s. Pt becomes short of breath and tachypnic when transferring to bedside commode.            Key Events - This Shift:       Decreasing HFNC needs, activity as tolerated, advancing diet as tolerated.                 Barriers to Discharge / Downgrade:     Pt downgraded to Med-surg tele status         Point of Contact Update: YES-OR-NO: Yes  Name:Riaz Mcfarlane  Phone Number:Updated at bedside  Summary of Conversation: Extra oxygen requirements are improving, ok to eat and drink some.  Will increase pt activity level as tolerated, ok to use bedside commode but will take it slow

## 2024-08-26 NOTE — PLAN OF CARE
Problem: Skin Injury Risk Increased  Goal: Skin Health and Integrity  Outcome: Progressing     Problem: Comorbidity Management  Goal: Maintenance of Asthma Control  Outcome: Progressing   Goal Outcome Evaluation:             Pt denies pain, heart rate tachy, BP wnl, and maintaining oxygen saturation with high flow nasal cannula. Will continue to monitor.     Merle Alvarado RN

## 2024-08-26 NOTE — PHARMACY-ADMISSION MEDICATION HISTORY
Admission medication history completed at [facility]. Please see Pharmacy Intern Admission Medication History note from 8/25/2024.    Jackeline Andrew, PharmD on 8/26/2024 at 6:34 AM

## 2024-08-26 NOTE — ED NOTES
Pt attempted to get up to commode at bedside, became tachypnic to 45/50 with increased work of breathing including all accessory muscles. pt tripoding at bedside unable to catch her breath stating she cannot breathe.  Provider notified, RT at bedside.   at bedside.  Pt being moved to room 3 in order to observe her closer due to resp condition.

## 2024-08-26 NOTE — ED NOTES
Pt VBG results are from 2121 but were mistakenly entered by lab at 0900.  Providers previously made aware of critical results.

## 2024-08-26 NOTE — ED NOTES
Pt work of breathing improved after meds.  Pt sitting up in bed states she is unable to lay down d/t difficulty breathing and being uncomfortable. Pt remains tachycardic and tachypneic.  BP has recovered from previous high.  Per RT Pt on high flow 55L, 60% FiO2    With any exertion pt desats quickly and is very slow to recover normal resp status    VBG results are from 2100 8/25 not 0900.  Lab is aware of error.    Venous Blood Gas  Recent Labs   Lab 08/25/24  1435 08/25/24  0900   PHV 7.39 7.27*   PCO2V 41 40   PO2V 28 25   HCO3V 25 18*   NAE 0.0 -8.6*   O2PER 63 80          Visit Vitals  /63   Pulse 120   Temp 98  F (36.7  C) (Oral)   Resp 30

## 2024-08-26 NOTE — PROGRESS NOTES
Patient is transferred for acute asthma management; not tolerating FIO2 titration. Pt remains on high 45 lpm/50% FIO2; increased work of breath not noted. Albuterol neb given. BS decreased/crackles.     Tiburcio Coelho, RT

## 2024-08-26 NOTE — H&P
[facility]    History and Physical - Hospitalist Service       Date of Admission:  8/26/2024    Assessment & Plan   Velma Mcfarlane is a 49 year old female with PMHx of asthma, UC, migraines who presents as a transfer from [facility] for progressing O2 need  from asthma exacerbation needing an ICU bed.    #Acute hypoxemic respiratory failure  #Acute asthma exacerbation  #Possible ADITI  #CAP  1 week of progressive shortness of breath that led to difficulty even talking. Was smoking marijuana earlier this summer but stopped in late July. Then started wax vaping marijuana several times just before this acute SOB started, likely the inciting event. CT PE without PE but with bilateral multifocal GGO. Moving air well on exam, some mild respiratory wheezes heard. Treating for asthma exacerbation, may also have a component of ADITI.  - Pulmonology consulted  - [facility], currently 45L, 50% FiO2  - Reduce methylprednisolone to 62mg daily from 62mg q6h  - Continue albuterol nebs q4h, can increase interval if improving  - Continue home ICS/LAMA equivalent  - Continue azithromycin and CTX for empiric CAP coverage given multifocal GGO  - RVP pending from [facility]  - Urine legionella and strep pneumo antigens ordered    #Leukocytosis  11.4 on admission then increased. Likely reactive to steroids.  - Monitor     #Albuterol-induced lactic acidosis  Lactic acid 5.5. BP normal. Low concern for untreated sepsis or ischemia.  - Treat asthma as above    #Hypokalemia  From albuterol  - Monitor    #Hypophosphatemia  - Phos protocol    #Ulcerative colitis  - Continue home mesalamine  - Chemical DVT ppx ordered    #Migraines  - Continue home amitriptyline            Diet: NPO for Medical/Clinical Reasons Except for: Meds, Ice Chips    DVT Prophylaxis: Heparin SQ  Reyes Catheter: Not present  Lines: None     Cardiac Monitoring: ACTIVE order. Indication: ICU  Code Status: Full Code      Clinically Significant Risk  Factors Present on Admission        # Hypokalemia: Lowest K = 3.3 mmol/L in last 2 days, will replace as needed   # Hypocalcemia: Lowest Ca = 8.4 mg/dL in last 2 days, will monitor and replace as appropriate                     # Asthma: noted on problem list              Disposition Plan     Medically Ready for Discharge: Anticipated in 2-4 Days           CHRISTINA BECERRIL MD  Hospitalist Service  [facility]  Securely message with [employer] (more info)  Text page via [employer] Paging/Directory     ______________________________________________________________________    Chief Complaint   SOB, asthma exacerbation    History is obtained from the patient, chart    History of Present Illness   Velma Mcfarlane is a 49 year old female with PMHx of asthma, migraines who presents as a transfer from [facility] for progressing O2 need needing an ICU bed which was unavailable at [facility]. On admission, the patient reports feeling short of breath but better from when she was first admitted. On HHFNC 45L, 50% FiO2.    Tachycardic. On exam, she has some mild wheezes bilaterally but moving air well. Labs unremarkable. Pulm consulting.      Past Medical History    Past Medical History:   Diagnosis Date    Asthma exacerbation     Non-specific colitis        Past Surgical History   Past Surgical History:   Procedure Laterality Date    CHOLECYSTECTOMY      COLONOSCOPY      COLONOSCOPY N/A 8/12/2019    Procedure: COLONOSCOPY, WITH POLYPECTOMY AND BIOPSY;  Surgeon: Yolanda Molina MD;  [facility]: [facility]    [facility] REMOVAL GALLBLADDER      Description: Cholecystectomy;  Recorded: 04/06/2011;       Prior to Admission Medications   Prior to Admission Medications   Prescriptions Last Dose Informant Patient Reported? Taking?   albuterol (PROAIR HFA/PROVENTIL HFA/VENTOLIN HFA) 108 (90 Base) MCG/ACT inhaler   No No   Sig: Inhale 2 puffs into the lungs every 4 hours as needed for shortness of breath, wheezing or cough    amitriptyline (ELAVIL) 25 MG tablet   Yes No   Sig: Take 25 mg by mouth at bedtime.   butalbital-acetaminophen-caffeine (FIORICET/ESGIC) -40 MG tablet   Yes No   Sig: Take 1 tablet by mouth every 6 hours as needed for migraine.   fluticasone-salmeterol (ADVAIR) 250-50 MCG/ACT inhaler   Yes No   Sig: Inhale 1 puff into the lungs every 12 hours.   mesalamine (LIALDA) 1.2 g DR tablet   No No   Sig: Take 4 tablets (4,800 mg) by mouth daily   mesalamine (ROWASA) 4 g enema   No No   Sig: Place 1 enema (4 g) rectally at bedtime      Facility-Administered Medications: None           Physical Exam   Vital Signs: Temp: 99.1  F (37.3  C) Temp src: Oral BP: 109/62 Pulse: 107   Resp: 23 SpO2: 96 % O2 Device: High Flow Nasal Cannula (HFNC) Oxygen Delivery: 45 LPM  Weight: 132 lbs 4.42 oz    General: No overt distress, some increased WOB noted, on HHFNC  HEENT: MMM  Pulmonary: Increased WOB, rare wheezes heard bilaterally, moving air well, no crackles  Cardiac: Tachycardic, regular. No m/r/g  Abdomen: Soft. NT, ND.  Extremities: No bilateral LE edema  Neuro: alert and awake, Ox4      Medical Decision Making       60 MINUTES SPENT BY ME on the date of service doing chart review, history, exam, documentation & further activities per the note.      Data     I have personally reviewed the following data over the past 24 hrs:    17.8 (H)  \   13.1   / 307     144 112 (H) 11.5 /  153 (H)   3.3 (L) 18 (L) 0.59 \     ALT: 15 AST: 20 AP: 70 TBILI: <0.2   ALB: 4.2 TOT PROTEIN: 7.2 LIPASE: N/A     Trop: <6 BNP: 81     Procal: 0.03 CRP: N/A Lactic Acid: 5.5 (HH)       INR:  N/A PTT:  N/A   D-dimer:  0.52 (H) Fibrinogen:  N/A       Imaging results reviewed over the past 24 hrs:   Recent Results (from the past 24 hour(s))   CT Chest Pulmonary Embolism w Contrast    Narrative    EXAM: CT CHEST PULMONARY EMBOLISM W CONTRAST  LOCATION: [facility]  DATE: 8/25/2024    INDICATION: profound hypoxia, positive dimer,  eval for possible PE or pna  COMPARISON: 10/9/2023  TECHNIQUE: CT chest pulmonary angiogram during arterial phase injection of IV contrast. Multiplanar reformats and MIP reconstructions were performed. Dose reduction techniques were used.   CONTRAST: 100 mL Omnipaque 350 intravenous contrast    FINDINGS:  ANGIOGRAM CHEST: Pulmonary arteries are normal caliber and negative for pulmonary emboli. Thoracic aorta is negative for dissection. No CT evidence of right heart strain.    LUNGS AND PLEURA: Bilateral geographic groundglass opacities, similar to prior examination. No new focal consolidation or effusion.    MEDIASTINUM/AXILLAE: Heart is normal in size. No mediastinal, axillary, or hilar adenopathy.    CORONARY ARTERY CALCIFICATION: None.    UPPER ABDOMEN: Normal.    MUSCULOSKELETAL: Degenerative changes of the spine.      Impression    IMPRESSION:  1.  No pulmonary embolism.  2.  Multifocal groundglass opacities bilaterally which overall appears similar to prior examination. This could be secondary to an infectious or inflammatory etiology. Overall, no significant interval change when compared to prior examination.

## 2024-08-26 NOTE — PLAN OF CARE
Goal Outcome Evaluation:      Plan of Care Reviewed With: patient          Outcome Evaluation: Pt anticipates returning home with spouse.

## 2024-08-26 NOTE — ED NOTES
Pt continues to tripod at bedside, having difficulty catching her breath.  Work of breathing decreased from previous but remains significant;  Pt having difficulty speaking in full sentences, breathing labored, less accessory muscle usage from previous assessment.  Please see 2100 vitals.  Providers aware, RT at bedside.  Medication administered.

## 2024-08-27 ENCOUNTER — TELEPHONE (OUTPATIENT)
Dept: PULMONOLOGY | Facility: CLINIC | Age: 49
End: 2024-08-27

## 2024-08-27 VITALS
RESPIRATION RATE: 20 BRPM | SYSTOLIC BLOOD PRESSURE: 114 MMHG | HEIGHT: 67 IN | OXYGEN SATURATION: 94 % | WEIGHT: 132.28 LBS | DIASTOLIC BLOOD PRESSURE: 75 MMHG | TEMPERATURE: 98.9 F | HEART RATE: 79 BPM | BODY MASS INDEX: 20.76 KG/M2

## 2024-08-27 DIAGNOSIS — J45.909 UNCOMPLICATED ASTHMA, UNSPECIFIED ASTHMA SEVERITY, UNSPECIFIED WHETHER PERSISTENT: Primary | ICD-10-CM

## 2024-08-27 PROBLEM — J18.9 PNEUMONIA OF BOTH LUNGS DUE TO INFECTIOUS ORGANISM, UNSPECIFIED PART OF LUNG: Status: RESOLVED | Noted: 2024-08-26 | Resolved: 2024-08-27

## 2024-08-27 LAB
ANION GAP SERPL CALCULATED.3IONS-SCNC: 13 MMOL/L (ref 7–15)
BUN SERPL-MCNC: 15.6 MG/DL (ref 6–20)
CALCIUM SERPL-MCNC: 8.8 MG/DL (ref 8.8–10.4)
CHLORIDE SERPL-SCNC: 104 MMOL/L (ref 98–107)
CREAT SERPL-MCNC: 0.63 MG/DL (ref 0.51–1.17)
EGFRCR SERPLBLD CKD-EPI 2021: >90 ML/MIN/1.73M2
ERYTHROCYTE [DISTWIDTH] IN BLOOD BY AUTOMATED COUNT: 14.6 % (ref 10–15)
GLUCOSE SERPL-MCNC: 126 MG/DL (ref 70–99)
HCO3 SERPL-SCNC: 20 MMOL/L (ref 22–29)
HCT VFR BLD AUTO: 39.5 % (ref 35–53)
HGB BLD-MCNC: 13.4 G/DL (ref 11.7–17.7)
MAGNESIUM SERPL-MCNC: 2.2 MG/DL (ref 1.7–2.3)
MCH RBC QN AUTO: 31.2 PG (ref 26.5–33)
MCHC RBC AUTO-ENTMCNC: 33.9 G/DL (ref 31.5–36.5)
MCV RBC AUTO: 92 FL (ref 78–100)
PHOSPHATE SERPL-MCNC: 3.8 MG/DL (ref 2.5–4.5)
PLATELET # BLD AUTO: 316 10E3/UL (ref 150–450)
POTASSIUM SERPL-SCNC: 4.7 MMOL/L (ref 3.4–5.3)
RBC # BLD AUTO: 4.29 10E6/UL (ref 3.8–5.9)
SODIUM SERPL-SCNC: 137 MMOL/L (ref 135–145)
WBC # BLD AUTO: 18.9 10E3/UL (ref 4–11)

## 2024-08-27 PROCEDURE — 36415 COLL VENOUS BLD VENIPUNCTURE: CPT | Performed by: INTERNAL MEDICINE

## 2024-08-27 PROCEDURE — 84100 ASSAY OF PHOSPHORUS: CPT | Performed by: INTERNAL MEDICINE

## 2024-08-27 PROCEDURE — 250N000011 HC RX IP 250 OP 636: Performed by: INTERNAL MEDICINE

## 2024-08-27 PROCEDURE — 85027 COMPLETE CBC AUTOMATED: CPT | Performed by: INTERNAL MEDICINE

## 2024-08-27 PROCEDURE — 94640 AIRWAY INHALATION TREATMENT: CPT | Mod: 76

## 2024-08-27 PROCEDURE — 250N000009 HC RX 250: Performed by: INTERNAL MEDICINE

## 2024-08-27 PROCEDURE — 250N000013 HC RX MED GY IP 250 OP 250 PS 637: Performed by: INTERNAL MEDICINE

## 2024-08-27 PROCEDURE — 999N000157 HC STATISTIC RCP TIME EA 10 MIN

## 2024-08-27 PROCEDURE — 80048 BASIC METABOLIC PNL TOTAL CA: CPT | Performed by: INTERNAL MEDICINE

## 2024-08-27 PROCEDURE — 94799 UNLISTED PULMONARY SVC/PX: CPT

## 2024-08-27 PROCEDURE — G0463 HOSPITAL OUTPT CLINIC VISIT: HCPCS

## 2024-08-27 PROCEDURE — 99239 HOSP IP/OBS DSCHRG MGMT >30: CPT | Performed by: INTERNAL MEDICINE

## 2024-08-27 PROCEDURE — 250N000012 HC RX MED GY IP 250 OP 636 PS 637: Performed by: INTERNAL MEDICINE

## 2024-08-27 PROCEDURE — 83735 ASSAY OF MAGNESIUM: CPT | Performed by: STUDENT IN AN ORGANIZED HEALTH CARE EDUCATION/TRAINING PROGRAM

## 2024-08-27 PROCEDURE — 999N000215 HC STATISTIC HFNC ADULT NON-CPAP

## 2024-08-27 PROCEDURE — 99232 SBSQ HOSP IP/OBS MODERATE 35: CPT | Performed by: INTERNAL MEDICINE

## 2024-08-27 RX ORDER — PREDNISONE 20 MG/1
40 TABLET ORAL DAILY
Status: DISCONTINUED | OUTPATIENT
Start: 2024-08-27 | End: 2024-08-27 | Stop reason: HOSPADM

## 2024-08-27 RX ORDER — PREDNISONE 20 MG/1
TABLET ORAL
Qty: 35 TABLET | Refills: 0 | Status: SHIPPED | OUTPATIENT
Start: 2024-08-27 | End: 2024-09-24

## 2024-08-27 RX ORDER — AZITHROMYCIN 500 MG/1
500 TABLET, FILM COATED ORAL DAILY
Qty: 4 TABLET | Refills: 0 | Status: SHIPPED | OUTPATIENT
Start: 2024-08-27 | End: 2024-08-31

## 2024-08-27 RX ORDER — ALBUTEROL SULFATE 0.83 MG/ML
2.5 SOLUTION RESPIRATORY (INHALATION) 4 TIMES DAILY
Status: DISCONTINUED | OUTPATIENT
Start: 2024-08-27 | End: 2024-08-27 | Stop reason: HOSPADM

## 2024-08-27 RX ORDER — PREDNISONE 5 MG/1
10 TABLET ORAL DAILY
Status: DISCONTINUED | OUTPATIENT
Start: 2024-09-17 | End: 2024-08-27 | Stop reason: HOSPADM

## 2024-08-27 RX ORDER — PREDNISONE 20 MG/1
20 TABLET ORAL DAILY
Status: DISCONTINUED | OUTPATIENT
Start: 2024-09-10 | End: 2024-08-27 | Stop reason: HOSPADM

## 2024-08-27 RX ORDER — ALBUTEROL SULFATE 5 MG/ML
2.5 SOLUTION RESPIRATORY (INHALATION) EVERY 4 HOURS PRN
Status: DISCONTINUED | OUTPATIENT
Start: 2024-08-27 | End: 2024-08-27 | Stop reason: HOSPADM

## 2024-08-27 RX ADMIN — PREDNISONE 40 MG: 20 TABLET ORAL at 14:04

## 2024-08-27 RX ADMIN — ALBUTEROL SULFATE 2.5 MG: 2.5 SOLUTION RESPIRATORY (INHALATION) at 15:38

## 2024-08-27 RX ADMIN — METHYLPREDNISOLONE SODIUM SUCCINATE 40 MG: 40 INJECTION INTRAMUSCULAR; INTRAVENOUS at 00:44

## 2024-08-27 RX ADMIN — METHYLPREDNISOLONE SODIUM SUCCINATE 40 MG: 40 INJECTION INTRAMUSCULAR; INTRAVENOUS at 08:48

## 2024-08-27 RX ADMIN — ALBUTEROL SULFATE 2.5 MG: 2.5 SOLUTION RESPIRATORY (INHALATION) at 07:56

## 2024-08-27 RX ADMIN — ACETAMINOPHEN 650 MG: 325 TABLET ORAL at 09:02

## 2024-08-27 RX ADMIN — MESALAMINE 4800 MG: 1.2 TABLET, DELAYED RELEASE ORAL at 08:48

## 2024-08-27 RX ADMIN — ALBUTEROL SULFATE 2.5 MG: 2.5 SOLUTION RESPIRATORY (INHALATION) at 13:30

## 2024-08-27 RX ADMIN — HEPARIN SODIUM 5000 UNITS: 10000 INJECTION, SOLUTION INTRAVENOUS; SUBCUTANEOUS at 08:52

## 2024-08-27 ASSESSMENT — ACTIVITIES OF DAILY LIVING (ADL)
ADLS_ACUITY_SCORE: 25

## 2024-08-27 NOTE — PLAN OF CARE
Patient given home O2 eval. Patient will need Oxygen at home. Oxygen delivered. Patient understood all discharge orders. Patient brought down to front entrance via w/c with all belongings.  accompanying her.   Problem: Adult Inpatient Plan of Care  Goal: Absence of Hospital-Acquired Illness or Injury  Intervention: Identify and Manage Fall Risk  Recent Flowsheet Documentation  Taken 8/27/2024 1600 by Tonja Zuniga RN  Safety Promotion/Fall Prevention:   clutter free environment maintained   nonskid shoes/slippers when out of bed  Intervention: Prevent Skin Injury  Recent Flowsheet Documentation  Taken 8/27/2024 1600 by Tonja Zuniga RN  Body Position: position changed independently  Intervention: Prevent Infection  Recent Flowsheet Documentation  Taken 8/27/2024 1600 by Tonja Zuniga RN  Infection Prevention:   hand hygiene promoted   rest/sleep promoted  Goal: Optimal Comfort and Wellbeing  Intervention: Provide Person-Centered Care  Recent Flowsheet Documentation  Taken 8/27/2024 1600 by Tonja Zuniga RN  Trust Relationship/Rapport:   care explained   choices provided     Problem: Risk for Delirium  Goal: Optimal Coping  Intervention: Optimize Psychosocial Adjustment to Delirium  Recent Flowsheet Documentation  Taken 8/27/2024 1600 by Tonja Zuniga RN  Supportive Measures:   relaxation techniques promoted   verbalization of feelings encouraged  Goal: Improved Behavioral Control  Intervention: Prevent and Manage Agitation  Recent Flowsheet Documentation  Taken 8/27/2024 1600 by Tonja Zuniga RN  Environment Familiarity/Consistency: daily routine followed  Intervention: Minimize Safety Risk  Recent Flowsheet Documentation  Taken 8/27/2024 1600 by Tonja Zuniga RN  Communication Enhancement Strategies: communication board used  Enhanced Safety Measures:   review medications for side effects with activity   room near unit station  Trust Relationship/Rapport:   care explained   choices  provided  Goal: Improved Attention and Thought Clarity  Intervention: Maximize Cognitive Function  Recent Flowsheet Documentation  Taken 8/27/2024 1600 by Tonja Zuniga RN  Reorientation Measures: clock in view     Problem: Skin Injury Risk Increased  Goal: Skin Health and Integrity  Intervention: Plan: Nurse Driven Intervention: Moisture Management  Recent Flowsheet Documentation  Taken 8/27/2024 1600 by Tonja Zuniga RN  Moisture Interventions: Encourage regular toileting  Intervention: Optimize Skin Protection  Recent Flowsheet Documentation  Taken 8/27/2024 1600 by Tonja Zuniga RN  Activity Management: activity adjusted per tolerance  Head of Bed (HOB) Positioning: HOB at 20-30 degrees     Problem: Comorbidity Management  Goal: Maintenance of Asthma Control  Intervention: Maintain Asthma Symptom Control  Recent Flowsheet Documentation  Taken 8/27/2024 1600 by Tonja Zuniga RN  Medication Review/Management: medications reviewed     Problem: Gas Exchange Impaired  Goal: Optimal Gas Exchange  Intervention: Optimize Oxygenation and Ventilation  Recent Flowsheet Documentation  Taken 8/27/2024 1600 by Tonja Zuniga RN  Head of Bed (HOB) Positioning: HOB at 20-30 degrees  Taken 8/27/2024 1550 by Tonja Zuniga RN  Airway/Ventilation Management: calming measures promoted     Problem: Bowel Disease, Inflammatory (Ulcerative Colitis or Crohn's Disease)  Goal: Optimal Adaptation to Chronic Illness  Intervention: Support Psychosocial Response to Illness  Recent Flowsheet Documentation  Taken 8/27/2024 1600 by Tonja Zuniga RN  Supportive Measures:   relaxation techniques promoted   verbalization of feelings encouraged  Goal: Diarrhea Symptom Relief  Intervention: Manage Diarrhea  Recent Flowsheet Documentation  Taken 8/27/2024 1600 by Tonja Zuniga RN  Perineal Care: perineal hygiene encouraged   Goal Outcome Evaluation:

## 2024-08-27 NOTE — PLAN OF CARE
Problem: Adult Inpatient Plan of Care  Goal: Absence of Hospital-Acquired Illness or Injury  Intervention: Prevent Infection  Recent Flowsheet Documentation  Taken 8/27/2024 0845 by Trish Hallman, RN  Infection Prevention:   hand hygiene promoted   rest/sleep promoted     Problem: Gas Exchange Impaired  Goal: Optimal Gas Exchange  Outcome: Progressing     Problem: Skin Injury Risk Increased  Goal: Skin Health and Integrity  Intervention: Optimize Skin Protection  Recent Flowsheet Documentation  Taken 8/27/2024 0845 by Trish Hallman, RN  Activity Management: activity adjusted per tolerance   Goal Outcome Evaluation:       Patient denied pain this shift. Oxygen need on 1 L NC most of the shift and was tolerated. Oxygen saturation 90-92%. After discharge orders received oxygen  was weaned to room air and she tolerated oxygen. Patient requested for home oxygen evaluation. New orders per provider.

## 2024-08-27 NOTE — PROVIDER NOTIFICATION
"RCAT Treatment Plan    Patient Score:  11  Patient Acuity: 3    Clinical Indication for Therapy: Asthma exac    Therapy Ordered: Albu QID + Q6PRN    Assessment Summary: Patient admitted with acute hypoxic respiratory failure in the setting of asthma exac, likely d/t vaping.  Still exhibiting exp wheezing and hypoxia, requiring nebs and HFNC. Will continue the nebs and wean off of HFNC.            08/27/24 0553   RCAT Assessment   Reason for Assessment Asthma   Pulmonary Status 3   Surgical Status 0   Chest X-ray 3   Respiratory Pattern 1   Mental Status 0   Breath Sounds 2   Cough Effectiveness 0   Level of Activity 0   O2 Required for SpO2>=92% 2   Acuity Level (points) 11   Acuity Level  3   Re-eval Interval Guideline Every 3 days   Re-evaluation Date 08/29/24   Clinical Indications/Symptoms   Aerosol Therapy RCAT protocol   Aerosol Therapy Plan   RT Treatment Nebulizer   Beta-Adrenergic Agonists Albuterol solution (premix) 2.5mg/3mL neb Max 12 doses/24h   Aerosol Treatment Frequency Acuity Level 3: QID/PRN @noc-Mod wheezing/Hx asthma/secretion removal       /76 (BP Location: Left arm)   Pulse 83   Temp 98.1  F (36.7  C) (Oral)   Resp 20   Ht 1.702 m (5' 7\")   Wt 60 kg (132 lb 4.4 oz)   LMP 08/25/2024 (Approximate)   SpO2 93%   BMI 20.72 kg/m         Kaushik Boucher, RT  8/27/2024   "

## 2024-08-27 NOTE — PROGRESS NOTES
Pulmonary Progress Note  8/27/2024      Admit Date: 8/26/2024  CODE: Full Code    Reason for Consult: acute respiratory failure with hypoxemia. Diffuse GGO's. Possible EVALI.     Assessment/Plan:   49F w/ hx of UC and asthma, recent vaping activity with THC wax/oil, presented 8/26 with hypoxemic respiratory failure and diffuse ggo's on CT. Marked improvement with high dose of steroids, nebs. Suspect EVALI. Essentially off O2 today.     Recommendations:  - titrate FiO2 for goal SpO2 90% or greater. Home O2 eval prior to discharge  - stop IV ceftriaxone. Continue azithromycin for 5 days total  - continue nebs prn  - stop IV steroids. Change to PO prednisone taper. N need for TMP/SMX for PJP ppx as this will be a fairly quick prednisone taper  - advised her to avoid vaping in the future  - will arrange for follow up in pulmonary clinic with PFTs.    No further recommendations; we'll sign off. Hoping she can go home today.  Please call with additional questions.     Mehdi (Jonathan) MD Aletha  [facility]monary [facility] ([facility])  Send me a secure message using [place] [phone]  Fax [phone]     Subjective/Interim Events:   Feels much better.  I took her off O2, she was maintaining her SpO2 above 90%.   Minimal cough.       Medications:     Current Facility-Administered Medications   Medication Dose Route Frequency Provider Last Rate Last Admin     Current Facility-Administered Medications   Medication Dose Route Frequency Provider Last Rate Last Admin    albuterol (PROVENTIL) neb solution 2.5 mg  2.5 mg Nebulization 4x Daily Anca Woods MD   2.5 mg at 08/27/24 0756    amitriptyline (ELAVIL) half-tab 12.5 mg  12.5 mg Oral At Bedtime Kandy Delgadillo MD   12.5 mg at 08/26/24 2028    azithromycin (ZITHROMAX) tablet 500 mg  500 mg Oral Daily Kandy Delgadillo MD   500 mg at 08/26/24 2027    cefTRIAXone (ROCEPHIN) 2 g vial to attach to  ml bag for ADULTS or NS 50 ml bag  "for PEDS  2 g Intravenous Q24H Kandy Delgadillo MD   2 g at 08/26/24 1755    [Held by provider] fluticasone-vilanterol (BREO ELLIPTA) 100-25 MCG/ACT inhaler 1 puff  1 puff Inhalation Daily Kandy Delgadillo MD   1 puff at 08/26/24 0757    heparin ANTICOAGULANT injection 5,000 Units  5,000 Units Subcutaneous Q12H Kandy Delgadillo MD   5,000 Units at 08/27/24 0852    mesalamine (LIALDA) DR tablet 4,800 mg  4,800 mg Oral Daily Kandy Delgadillo MD   4,800 mg at 08/27/24 0848    mesalamine (ROWASA) enema 4 g  4 g Rectal At Bedtime Kandy Delgadillo MD        predniSONE (DELTASONE) tablet 40 mg  40 mg Oral Daily Mehdi Pompa MD        Followed by    [START ON 9/3/2024] predniSONE (DELTASONE) tablet 30 mg  30 mg Oral Daily Mehdi Pompa MD        Followed by    [START ON 9/10/2024] predniSONE (DELTASONE) tablet 20 mg  20 mg Oral Daily Mehdi Pompa MD        Followed by    [START ON 9/17/2024] predniSONE (DELTASONE) tablet 10 mg  10 mg Oral Daily Mehdi Pompa MD        sodium chloride (PF) 0.9% PF flush 3 mL  3 mL Intracatheter Q8H Kandy Delgadillo MD   3 mL at 08/27/24 0737         Exam/Data:   Vitals  /78 (BP Location: Left arm)   Pulse 85   Temp 98  F (36.7  C) (Oral)   Resp 22   Ht 1.702 m (5' 7\")   Wt 60 kg (132 lb 4.4 oz)   LMP 08/25/2024 (Approximate)   SpO2 92%   BMI 20.72 kg/m       I/O last 3 completed shifts:  In: 517 [P.O.:250; I.V.:267]  Out: 800 [Urine:800]  Weight change: 0 kg (0 lb)  [unfilled]  FiO2 (%): 35 %, Resp: 22    EXAM:  Physical Exam  Gen: awake, alert, oriented, no distress  HEENT: NT, no KENIA  CV: RRR, no m/g/r  Resp: diffuse wheezing.   Abd: soft, nontender, BS+  Skin: no rashes or lesions  Ext: no edema  Neuro: PERRL, nonfocal exam    ROS:  A 10-system review was obtained and is negative with the exception of the symptoms noted above.    DATA:    PFT DATA   None available    Micro  Viral swabs neg  Legionella + strep pneumo ag neg      IMAGING:   No results " found.

## 2024-08-27 NOTE — PLAN OF CARE
Patient is A&Ox4. She is cooperative with cares. Patient on high flow O2 at 35%, 40 L.  Patient denies. When patient transferred to unit O2 was only 88% on high flow but patient was able to rest and Oxygen saturation went to 90%.  At end of shift patient's sats up to 94% on high flow. Tele reads sinus tach.   Problem: Adult Inpatient Plan of Care  Goal: Optimal Comfort and Wellbeing  Outcome: Progressing     Problem: Risk for Delirium  Goal: Improved Behavioral Control  Outcome: Progressing  Goal: Improved Sleep  Outcome: Progressing     Problem: Skin Injury Risk Increased  Goal: Skin Health and Integrity  Outcome: Progressing  Intervention: Optimize Skin Protection  Recent Flowsheet Documentation  Taken 8/26/2024 1954 by Tonja Zuniga, RN  Head of Bed (HOB) Positioning: HOB at 20-30 degrees  Taken 8/26/2024 1851 by Tonja Zuniga, RN  Activity Management: activity adjusted per tolerance  Head of Bed (HOB) Positioning: HOB at 45 degrees     Problem: Comorbidity Management  Goal: Maintenance of Asthma Control  Outcome: Progressing     Problem: Gas Exchange Impaired  Goal: Optimal Gas Exchange  Outcome: Progressing  Intervention: Optimize Oxygenation and Ventilation  Recent Flowsheet Documentation  Taken 8/26/2024 1954 by Tonja Zuniga, RN  Head of Bed (HOB) Positioning: HOB at 20-30 degrees  Taken 8/26/2024 1851 by Tonja Zuniga, RN  Head of Bed (HOB) Positioning: HOB at 45 degrees     Problem: Adult Inpatient Plan of Care  Goal: Absence of Hospital-Acquired Illness or Injury  Intervention: Prevent Skin Injury  Recent Flowsheet Documentation  Taken 8/26/2024 1954 by Tonja Zuniga, RN  Body Position: sitting up in bed  Taken 8/26/2024 1851 by Tonja Zuniga, RN  Body Position: position changed independently   Goal Outcome Evaluation:

## 2024-08-27 NOTE — PROGRESS NOTES
Patient has been assessed for Home Oxygen needs.     Pulse oximetry (SpO2) and Oxygen flow readings:    SpO2 = 90% on room air at rest while awake.    SpO2 improved to 94% on RA liters/minute at rest.    SpO2 = 85% on room air during activity/with exercise.    *SpO2 improved to 90% on 87 liters/minute during activity/with exercise.

## 2024-08-27 NOTE — CONSULTS
See initial consult note by Dr. Saavedra from 8/26    Mehdi (Jonathan) MD Aletha  [facility] Pulmonary & Critical Care ([facility])  Send me a secure message using [email or web address]  Clinic [phone]  Fax [phone]

## 2024-08-27 NOTE — DISCHARGE SUMMARY
[facility]  Hospitalist Discharge Summary      Date of Admission:  8/26/2024  Date of Discharge:  8/27/2024  Discharging Provider: Anca Woods MD  Discharge Service: Hospitalist Service    Discharge Diagnoses   Acute hypoxemic respiratory failure secondary to asthma exacerbation-requiring short term oygen  Leukocytosis  Albuterol induced lactic acidosis  Ulcerative colitis  Migraines      Clinically Significant Risk Factors          Follow-ups Needed After Discharge   Follow-up Appointments     Follow-up and recommended labs and tests       Follow up with primary care provider, [facility], within 7   days to evaluate medication change, for hospital follow- up, and regarding   new diagnosis.  The following labs/tests are recommended: none.            Unresulted Labs Ordered in the Past 30 Days of this Admission       No orders found for last 31 day(s).        These results will be followed up by Anca Woods    Discharge Disposition   Discharged to home  Condition at discharge: Stable    Hospital Course   Velma Mcfarlane is a 49 year old female with PMHx of asthma, UC, migraines who presents as a transfer from [facility] for progressing O2 need  from asthma exacerbation needing an ICU bed.  He had acute hypoxic respiratory failure initially requiring high flow nasal cannula, then titrated to nasal cannula, and prior to discharge was saturating well on room air.  She was initially started on methylprednisolone but will be transition to oral prednisone at discharge for a long taper.  She will continue on albuterol outpatient, also had nebulizer while inpatient.  She was started on prophylactic treatment for Including ceftriaxone and azithromycin.  She will be discharged on 4 additional days of azithromycin to complete treatment.    Acute hypoxemic respiratory failure-improved  Acute asthma exacerbation-improved  Possible ADITI- MJ vaping  Possible CAP  1 week  of progressive shortness of breath that led to difficulty even talking. Was smoking marijuana earlier this summer but stopped in late July. Then started wax vaping marijuana several times just before this acute SOB started, likely the inciting event. CT PE without PE but with bilateral multifocal GGO. Treating for asthma exacerbation, may also have a component of ADITI. Initially on HFNC then NC. Will discharge on 1-2 L via NC.  - Pulmonology consulted: will follow up outpatient in clinic  - long oral prednisone taper at discharge  - Continue albuterol nebs q4h at home as needed  - While inpatient was on azithromycin and CTX for empiric CAP coverage, switched to azithromycin for 4 more days at discharge    Leukocytosis  11.4 on admission then increased. Likely reactive to steroids.    Albuterol-induced lactic acidosis  Lactic acid 5.5. BP normal. Low concern for untreated sepsis or ischemia    Hypokalemia- resolved  From albuterol    Hypophosphatemia- resolved    Ulcerative colitis  - Continue home mesalamine    Migraines  - Continue home amitriptyline      Consultations This Hospital Stay   INFECTIOUS DISEASES IP CONSULT  INTENSIVIST IP CONSULT  PULMONARY IP CONSULT  CARE MANAGEMENT / SOCIAL WORK IP CONSULT    Code Status   Full Code    Time Spent on this Encounter   I, Anca Woods MD, personally saw the patient today and spent greater than 30 minutes discharging this patient.       Anca Woods MD  [place] [id number]  Phone: [phone]  Fax: [phone]  ______________________________________________________________________    Physical Exam   Vital Signs: Temp: 98  F (36.7  C) Temp src: Oral BP: 127/78 Pulse: 85   Resp: 22 SpO2: 92 % O2 Device: Nasal cannula Oxygen Delivery: 1 LPM  Weight: 132 lbs 4.42 oz  General Appearance: Awake, alert, in no acute distress  Respiratory: Scattered expiratory wheezes throughout, significantly  increased aeration from yesterday  Cardiovascular: RRR, no murmur noted  GI: soft, nontender, non distended, normal bowel sounds  Skin: no jaundice, no rash         Primary Care Physician   [facility]    Discharge Orders      Adult Pulmonary Medicine  Referral      Reason for your hospital stay    Asthma exacerbtion     Follow-up and recommended labs and tests     [facility], within 7 days to evaluate medication change, for hospital follow- up, and regarding new diagnosis.  The following labs/tests are recommended: none.     Activity    Your activity upon discharge: activity as tolerated     Diet    Follow this diet upon discharge: Regular Diet Adult       Significant Results and Procedures   Most Recent 3 CBC's:  Recent Labs   Lab Test 08/27/24  0542 08/26/24  0837 08/25/24  2336   WBC 18.9* 22.5* 17.8*   HGB 13.4 12.4 13.1   MCV 92 93 91    315 307     Most Recent 3 BMP's:  Recent Labs   Lab Test 08/27/24  0542 08/26/24  0837 08/25/24  2336    141 144   POTASSIUM 4.7 4.3  4.3 3.3*   CHLORIDE 104 111* 112*   CO2 20* 18* 18*   BUN 15.6 8.6 11.5   CR 0.63 0.54 0.59   ANIONGAP 13 12 14   JILLIAN 8.8 8.1* 8.4*   * 117* 153*   ,   Results for orders placed or performed during the hospital encounter of 08/25/24   CT Chest Pulmonary Embolism w Contrast    Narrative    EXAM: CT CHEST PULMONARY EMBOLISM W CONTRAST  LOCATION: [facility]  DATE: 8/25/2024    INDICATION: profound hypoxia, positive dimer, eval for possible PE or pna  COMPARISON: 10/9/2023  TECHNIQUE: CT chest pulmonary angiogram during arterial phase injection of IV contrast. Multiplanar reformats and MIP reconstructions were performed. Dose reduction techniques were used.   CONTRAST: 100 mL Omnipaque 350 intravenous contrast    FINDINGS:  ANGIOGRAM CHEST: Pulmonary arteries are normal caliber and negative for pulmonary emboli. Thoracic aorta  is negative for dissection. No CT evidence of right heart strain.    LUNGS AND PLEURA: Bilateral geographic groundglass opacities, similar to prior examination. No new focal consolidation or effusion.    MEDIASTINUM/AXILLAE: Heart is normal in size. No mediastinal, axillary, or hilar adenopathy.    CORONARY ARTERY CALCIFICATION: None.    UPPER ABDOMEN: Normal.    MUSCULOSKELETAL: Degenerative changes of the spine.      Impression    IMPRESSION:  1.  No pulmonary embolism.  2.  Multifocal groundglass opacities bilaterally which overall appears similar to prior examination. This could be secondary to an infectious or inflammatory etiology. Overall, no significant interval change when compared to prior examination.       Discharge Medications   Current Discharge Medication List        START taking these medications    Details   azithromycin (ZITHROMAX) 500 MG tablet Take 1 tablet (500 mg) by mouth daily for 4 days.  Qty: 4 tablet, Refills: 0    Associated Diagnoses: Severe asthma with exacerbation, unspecified whether persistent      predniSONE (DELTASONE) 20 MG tablet Take 2 tablets (40 mg) by mouth daily for 7 days, THEN 1.5 tablets (30 mg) daily for 7 days, THEN 1 tablet (20 mg) daily for 7 days, THEN 0.5 tablets (10 mg) daily for 7 days.  Qty: 35 tablet, Refills: 0    Associated Diagnoses: Severe asthma with exacerbation, unspecified whether persistent           CONTINUE these medications which have NOT CHANGED    Details   albuterol (PROAIR HFA/PROVENTIL HFA/VENTOLIN HFA) 108 (90 Base) MCG/ACT inhaler Inhale 2 puffs into the lungs every 4 hours as needed for shortness of breath, wheezing or cough  Qty: 18 g, Refills: 0    Comments: Pharmacy may dispense brand covered by insurance (Proair, or proventil or ventolin or generic albuterol inhaler)      amitriptyline (ELAVIL) 25 MG tablet Take 25 mg by mouth at bedtime.      butalbital-acetaminophen-caffeine (FIORICET/ESGIC) -40 MG tablet Take 1 tablet by mouth  every 6 hours as needed for migraine.      fluticasone-salmeterol (ADVAIR) 250-50 MCG/ACT inhaler Inhale 1 puff into the lungs every 12 hours.      mesalamine (LIALDA) 1.2 g DR tablet Take 4 tablets (4,800 mg) by mouth daily  Qty: 360 tablet, Refills: 3    Associated Diagnoses: Segmental colitis associated with diverticulosis (H)      mesalamine (ROWASA) 4 g enema Place 1 enema (4 g) rectally at bedtime  Qty: 1800 mL, Refills: 11    Associated Diagnoses: SCAD (short-chain acyl-CoA dehydrogenase deficiency) (H24); Segmental colitis without complication (H)           Allergies   Allergies   Allergen Reactions    Sulfa Antibiotics Hives     Other reaction(s): *Unknown

## 2024-08-27 NOTE — PROGRESS NOTES
Care Management Discharge Note    Discharge Date: 08/27/2024       Discharge Disposition:  Home    Discharge Services:  none        Additional Information:  Pt discharging home.     YULISA Mao

## 2024-08-27 NOTE — PLAN OF CARE
Problem: Gas Exchange Impaired  Goal: Optimal Gas Exchange  Outcome: Progressing  Intervention: Optimize Oxygenation and Ventilation  Recent Flowsheet Documentation  Taken 8/27/2024 0050 by Elle Davis RN  Head of Bed (HOB) Positioning: HOB at 20-30 degrees   Goal Outcome Evaluation: VSS on high-flow oxygen    Problem: Adult Inpatient Plan of Care  Goal: Optimal Comfort and Wellbeing  Outcome: Progressing  Denies pain    Patient slept quietly overnight. Up to BSC, needs assist with lines. Tele=NSR.

## 2024-08-27 NOTE — UTILIZATION REVIEW
Admission Status; Secondary Review Determination       Under the authority of the Utilization Management Committee, the utilization review process indicated a secondary review on the above patient. The review outcome is based on review of the medical records, discussions with staff, and applying clinical experience noted on the date of the review.     (x) Inpatient Status Appropriate - This patient's medical care is consistent with medical management for inpatient care and reasonable inpatient medical practice.     RATIONALE FOR DETERMINATION:  50 y/o female presented to another [facility] hospital and was admitted 8/25/24 (see 8/25/24 H&P for details) with severe acute respiratory failure with asthma exacerbation.  There were no beds at the time there that could accommodate her so she ultimately was moved to another system hospital/ICU.   She since has improved and is now nearing discharge.    At the time of admission with the information available to the attending physician more than 2 nights Hospital complex care was anticipated, based on patient risk of adverse outcome if treated as outpatient and complex care required. Inpatient admission is appropriate based on the Medicare guidelines.   The information on this document is developed by the utilization review team in order for the business office to ensure compliance. This only denotes the appropriateness of proper admission status and does not reflect the quality of care rendered.   The definitions of Inpatient Status and Observation Status used in making the determination above are those provided in the CMS Coverage Manual, Chapter 1 and Chapter 6, section 70.4.     Sincerely,     Gerard Burks DO, [facility]  Utilization Review  Physician Advisor

## 2024-08-27 NOTE — INTERIM SUMMARY
Oxygen Documentation  I certify that this patient, Velma Mcfarlane has been under my care (or a nurse practitioner or physican's assistant working with me). This is the face-to-face encounter for oxygen medical necessity.      At the time of this encounter, I have reviewed the qualifying testing and have determined that supplemental oxygen is reasonable and necessary and is expected to improve the patient's condition in a home setting.         Patient has continued oxygen desaturation due to Severe Persistent Asthma J45.50  Acute Respiratory Failure J96.01.    If portability is ordered, is the patient mobile within the home? yes    Was this visit performed as a telehealth visit: No

## 2024-08-29 ENCOUNTER — PATIENT OUTREACH (OUTPATIENT)
Dept: CARE COORDINATION | Facility: CLINIC | Age: 49
End: 2024-08-29
Payer: COMMERCIAL

## 2024-08-29 NOTE — PROGRESS NOTES
[facility]: Transitions of Care Outreach  Chief Complaint   Patient presents with    Clinic Care Coordination - Post Hospital       Most Recent Admission Date: 8/26/2024   Most Recent Admission Diagnosis:      Most Recent Discharge Date: 8/27/2024   Most Recent Discharge Diagnosis: Severe asthma with exacerbation, unspecified whether persistent - J45.901  Acute respiratory failure with hypoxia (H) - J96.01     Transitions of Care Assessment    Discharge Assessment  How are you doing now that you are home?: I am doing okay.  How are your symptoms? (Red Flag symptoms escalate to triage hotline per guidelines): Improved  Do you know how to contact your clinic care team if you have future questions or changes to your health status? : Yes  Does the patient have their discharge instructions? : Yes  Does the patient have questions regarding their discharge instructions? : No  Were you started on any new medications or were there changes to any of your previous medications? : Yes  Does the patient have all of their medications?: Yes  Do you have questions regarding any of your medications? : No                  Follow up Plan     Discharge Follow-Up  Discharge follow up appointment scheduled in alignment with recommended follow up timeframe or Transitions of Risk Category? (Low = within 30 days; Moderate= within 14 days; High= within 7 days): Yes  Discharge Follow Up Appointment Date: 10/02/24  Discharge Follow Up Appointment Scheduled with?: Specialty Care Provider    Future Appointments   Date Time Provider Department Center   10/2/2024 11:00 AM [facility] PFT RM 1 [facility]   10/2/2024  1:45 PM Velma Hampton, NP [facility]       Outpatient Plan as outlined on AVS reviewed with patient.    For any urgent concerns, please contact our 24 hour nurse triage line: [phone] ([phone])       SANTHOSH Abel  [phone]  [facility]

## 2024-09-03 ENCOUNTER — TRANSFERRED RECORDS (OUTPATIENT)
Dept: HEALTH INFORMATION MANAGEMENT | Facility: CLINIC | Age: 49
End: 2024-09-03

## 2024-10-22 ENCOUNTER — LAB REQUISITION (OUTPATIENT)
Dept: LAB | Facility: CLINIC | Age: 49
End: 2024-10-22
Payer: COMMERCIAL

## 2024-10-22 DIAGNOSIS — J30.9 ALLERGIC RHINITIS, UNSPECIFIED: ICD-10-CM

## 2024-10-22 PROCEDURE — 86003 ALLG SPEC IGE CRUDE XTRC EA: CPT | Mod: ORL | Performed by: STUDENT IN AN ORGANIZED HEALTH CARE EDUCATION/TRAINING PROGRAM

## 2024-10-23 ENCOUNTER — OFFICE VISIT (OUTPATIENT)
Dept: PULMONOLOGY | Facility: CLINIC | Age: 49
End: 2024-10-23
Attending: INTERNAL MEDICINE
Payer: COMMERCIAL

## 2024-10-23 VITALS
SYSTOLIC BLOOD PRESSURE: 104 MMHG | WEIGHT: 141.6 LBS | DIASTOLIC BLOOD PRESSURE: 66 MMHG | OXYGEN SATURATION: 97 % | HEIGHT: 66 IN | HEART RATE: 75 BPM | BODY MASS INDEX: 22.76 KG/M2

## 2024-10-23 DIAGNOSIS — J45.909 UNCOMPLICATED ASTHMA, UNSPECIFIED ASTHMA SEVERITY, UNSPECIFIED WHETHER PERSISTENT: ICD-10-CM

## 2024-10-23 DIAGNOSIS — J30.89 ALLERGIC RHINITIS DUE TO OTHER ALLERGIC TRIGGER, UNSPECIFIED SEASONALITY: ICD-10-CM

## 2024-10-23 DIAGNOSIS — J45.40 MODERATE PERSISTENT ASTHMA WITHOUT COMPLICATION: Primary | ICD-10-CM

## 2024-10-23 LAB — HGB BLD-MCNC: 13.1 G/DL

## 2024-10-23 PROCEDURE — 99204 OFFICE O/P NEW MOD 45 MIN: CPT | Performed by: NURSE PRACTITIONER

## 2024-10-23 PROCEDURE — 99207 PR NO BILLABLE SERVICE THIS VISIT: CPT | Performed by: INTERNAL MEDICINE

## 2024-10-23 PROCEDURE — 94375 RESPIRATORY FLOW VOLUME LOOP: CPT | Mod: 26 | Performed by: INTERNAL MEDICINE

## 2024-10-23 PROCEDURE — 94729 DIFFUSING CAPACITY: CPT | Mod: 26 | Performed by: INTERNAL MEDICINE

## 2024-10-23 PROCEDURE — 85018 HEMOGLOBIN: CPT | Mod: QW

## 2024-10-23 NOTE — PATIENT INSTRUCTIONS
It was a pleasure seeing you in clinic today. This is what we discussed:    Your lung function is normal. You could try peeling back the Advair if you would like.   If your breathing worsens let me know. We can repeat the spirometry and get a CT scan.   Use your albuterol every 4 hours as needed for cough, shortness of breath, wheeze, or chest tightness.   Follow up as needed   If you have worsening symptoms, questions, or need to speak with the nurse please call [phone].

## 2024-10-23 NOTE — PROGRESS NOTES
Pulmonary Outpatient Consult Note  October 23, 2024      Assessment and Plan:   Velma Mcfarlane is a 49 year old F, never smoker with vape use recently, with history of asthma, UC, and migraines presenting today for hospital follow up.   She was admitted 8/26/2024 w/ acute hypoxic respiratory failure in the setting of 1 week of progressively worsening dyspnea. Concern for E-cigarette- or vaping-use-associated lung injury (EVALI) due to recent vaping activity with THC wax/oil. There was diffuse ggo's on CT. Marked improvement with high dose of steroids and nebulizer treatments support EVALI. Weaned to room air prior to discharge.     Since discharge reports resolution of symptoms. She has not vaped since and does not have plans use again. Has been consistent with BID Advair use. Has seen an allergy specialist at [facility]. Is hoping to get allergy shots again.      #. Acute hypoxic respiratory failure associated w/ bilateral groundglass opacities suggestive of EVALI: Symptoms resolved.  I recommended a follow-up CT scan to ensure resolution and see if there is any scarring but she would like to hold off for now as she has frequent CT scans for her UC.  As she is completely asymptomatic this is fine to hold off on.  If symptoms worsen recommend CT chest   Could consider repeat spirometry if she discontinues daily Advair use.  Encouraged her to avoid any inhaled substances in the future.  #. Asthma exacerbation, likely d/t vaping: Resolved.  Continues to have some postnasal drip and throat clearing.  Denies any cough, wheeze, or shortness of breath.   Recommended she continue Advair 1-2 times daily or as needed.  If she remains stable could consider peeling back therapy.  I instructed her to do 1 puff daily for 2 weeks, then 1 puff every other day for 2 weeks.  If symptoms do not return she can discontinue daily use.  Albuterol as needed  Continue follow-up in allergy clinic.  Recommend she stay  "up-to-date with respiratory vaccines.    RTC as needed    Meghan Pearce CNP  Pulmonary Medicine  ______________________________________________________________________________    CC:   Chief Complaint   Patient presents with    [facility] Hospitalization     Date of Admission: 8/26/24  Date of Discharge: 8/27/24  Reason for Consult: acute respiratory failure with hypoxemia. Diffuse GGO's. Possible EVALI.   PFT 10/23/24  CT CHEST PE 8/25/24         HPI:   Velma presents today for hospital follow-up.  Diagnosed with asthma in her 30's. Started with exercise induced only but seems to have worsened. Managed with albuterol alone for many years. Started ICS/LABA in 2020 but this was a prn medication. Has been using this daily since discharge.   Reports \"walking pneumonia\" from having to wear a mask during COVID.   Has noticed worsening seasonal allergy symptoms recently. Did get allergy shots as a child. Is going to [facility].     Following THC vape/wax dab use noticed dyspnea.  Initially presented to ED and was found to have diffuse groundglass opacities on CT imaging.  After requiring high flow nasal cannula to maintain her O2 saturations she is on 45 L of flow and 64% FiO2 she was transferred to ICU.  Following initiation of steroids she had symptomatic improvement and was weaned from oxygen.  Urine strep pneumoniae and Legionella negative.  Treated with IV ceftriaxone. Continue azithromycin for 5 days total.   Initially given IV steroids but was converted to oral prior to discharge.    Has continued twice daily Advair dosing at home.  Denies any respiratory symptoms.  Still having nasal congestion and seasonal allergy symptoms.    FH- sister and father have bad asthma.     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   Created by Conversion  Created by Conversion      Splenic artery aneurysm (H) 08/24/2018     Priority: Medium       PSH:  Past Surgical History:   Procedure Laterality Date    CHOLECYSTECTOMY      COLONOSCOPY      COLONOSCOPY N/A 8/12/2019    Procedure: COLONOSCOPY, WITH POLYPECTOMY AND BIOPSY;  Surgeon: Yolanda Molina MD;  Location: [facility]    HC REMOVAL GALLBLADDER      Description: Cholecystectomy;  Recorded: 04/06/2011;       Current Meds:  Current Outpatient Medications   Medication Sig Dispense Refill    albuterol (PROAIR HFA/PROVENTIL HFA/VENTOLIN HFA) 108 (90 Base) MCG/ACT inhaler Inhale 2 puffs into the lungs every 4 hours as needed for shortness of breath, wheezing or cough 18 g 0    amitriptyline (ELAVIL) 25 MG tablet Take 25 mg by mouth at bedtime.      butalbital-acetaminophen-caffeine (FIORICET/ESGIC) -40 MG tablet Take 1 tablet by mouth every 6 hours as needed for migraine.      cholecalciferol (VITAMIN D3) 125 mcg (5000 units) capsule Take by mouth daily.      fluticasone-salmeterol (ADVAIR) 250-50 MCG/ACT inhaler Inhale 1 puff into the lungs every 12 hours.      mesalamine (LIALDA) 1.2 g DR tablet Take 4 tablets (4,800 mg) by mouth daily 360 tablet 3    mesalamine (ROWASA) 4 g enema Place 1 enema (4 g) rectally at bedtime 1800 mL 11     No current facility-administered medications for this visit.       Allergies:  Allergies   Allergen Reactions     Sulfa Antibiotics Hives     Other reaction(s): *Unknown    Tetracycline Hives       Social Hx:  Social History     Socioeconomic History    Marital status:      Spouse name: Not on file    Number of children: Not on file    Years of education: Not on file    Highest education level: Not on file   Occupational History    Not on file   Tobacco Use    Smoking status: Never     Passive exposure: Past (Dad was a smoker)    Smokeless tobacco: Never   Vaping Use    Vaping status: Never Used   Substance and Sexual Activity    Alcohol use: Yes     Comment: occasional    Drug use: Yes     Types: Marijuana     Comment: uses wax and vape    Sexual activity: Not on file   Other Topics Concern    Parent/sibling w/ CABG, MI or angioplasty before 65F 55M? Not Asked   Social History Narrative    Not on file     Social Drivers of Health     Financial Resource Strain: Low Risk  (8/26/2024)    Financial Resource Strain     Within the past 12 months, have you or your family members you live with been unable to get utilities (heat, electricity) when it was really needed?: No   Food Insecurity: Low Risk  (8/26/2024)    Food Insecurity     Within the past 12 months, did you worry that your food would run out before you got money to buy more?: No     Within the past 12 months, did the food you bought just not last and you didn t have money to get more?: No   Transportation Needs: Low Risk  (8/26/2024)    Transportation Needs     Within the past 12 months, has lack of transportation kept you from medical appointments, getting your medicines, non-medical meetings or appointments, work, or from getting things that you need?: No   Physical Activity: Not on file   Stress: Not on file   Social Connections: Not on file   Interpersonal Safety: Low Risk  (8/26/2024)    Interpersonal Safety     Do you feel physically and emotionally safe where you currently live?: Yes     Within the past 12 months, have you been hit, slapped, kicked or  "otherwise physically hurt by someone?: No     Within the past 12 months, have you been humiliated or emotionally abused in other ways by your partner or ex-partner?: No   Recent Concern: Interpersonal Safety - High Risk (8/26/2024)    Interpersonal Safety     Do you feel physically and emotionally safe where you currently live?: No     Within the past 12 months, have you been hit, slapped, kicked or otherwise physically hurt by someone?: No     Within the past 12 months, have you been humiliated or emotionally abused in other ways by your partner or ex-partner?: No   Housing Stability: Low Risk  (8/26/2024)    Housing Stability     Do you have housing? : Yes     Are you worried about losing your housing?: No     Family HX: family history includes Cerebrovascular Disease in her maternal grandfather and paternal grandfather; Coronary Artery Disease in her father; Diverticulitis in her father.    ROS:   10-point review performed and notable for the above-mentioned symptoms. The remainder reviewed and negative.     Physical Exam:  /66 (BP Location: Left arm, Patient Position: Sitting, Cuff Size: Adult Regular)   Pulse 75   Ht 1.676 m (5' 6\")   Wt 64.2 kg (141 lb 9.6 oz)   LMP 08/25/2024 (Approximate)   SpO2 97%   BMI 22.85 kg/m      Physical Exam  Constitutional:       General: She is not in acute distress.     Appearance: She is not ill-appearing.   Cardiovascular:      Rate and Rhythm: Normal rate and regular rhythm.      Heart sounds: Normal heart sounds.   Pulmonary:      Effort: Pulmonary effort is normal. No respiratory distress.      Breath sounds: No wheezing or rhonchi.   Neurological:      Mental Status: She is alert.   Psychiatric:         Behavior: Behavior normal.         PFT's     10/23/2024:      Impression:  Full Pulmonary Function Test is abnormal.  PFTs are consistent with  NO  obstructive disease.  Spirometry is not consistent with reversibility.  There is no hyperinflation.  There is no " air-trapping.  Diffusion capacity when corrected for hemoglobin is not reduced.    Labs:   personally reviewed in the EMR.   Latest Reference Range & Units 08/26/24 09:18   S Pneumoniae Antigen Negative  Negative      Latest Reference Range & Units 08/25/24 13:28 08/25/24 23:36   Absolute Eosinophils 0.0 - 0.7 10e3/uL 2.2 (H)    Absolute Eosinophils 0.0 - 0.7 10e3/uL  0.0     Imaging studies: personally reviewed and interpreted. Below are the Radiology interpretations.    CT CHEST PULMONARY EMBOLISM W CONTRAST, DATE: 8/25/2024  INDICATION: profound hypoxia, positive dimer, eval for possible PE or pna  COMPARISON: 10/9/2023  FINDINGS:  ANGIOGRAM CHEST: Pulmonary arteries are normal caliber and negative for pulmonary emboli. Thoracic aorta is negative for dissection. No CT evidence of right heart strain.  LUNGS AND PLEURA: Bilateral geographic groundglass opacities, similar to prior examination. No new focal consolidation or effusion.                                                                IMPRESSION:  1.  No pulmonary embolism.  2.  Multifocal groundglass opacities bilaterally which overall appears similar to prior examination. This could be secondary to an infectious or inflammatory etiology. Overall, no significant interval change when compared to prior examination.

## 2024-10-24 LAB
A ALTERNATA IGE QN: <0.1 KU(A)/L
A FUMIGATUS IGE QN: <0.1 KU(A)/L
BERMUDA GRASS IGE QN: <0.1 KU(A)/L
C HERBARUM IGE QN: <0.1 KU(A)/L
CAT DANDER IGG QN: <0.1 KU(A)/L
CEDAR IGE QN: <0.1 KU(A)/L
COMMON RAGWEED IGE QN: <0.1 KU(A)/L
COTTONWOOD IGE QN: <0.1 KU(A)/L
D FARINAE IGE QN: <0.1 KU(A)/L
D PTERONYSS IGE QN: <0.1 KU(A)/L
DEPRECATED IGE QN: <0.1 KU(A)/L
DOG DANDER+EPITH IGE QN: <0.1 KU(A)/L
EAST WHITE PINE IGE QN: <0.1 KU(A)/L
GIANT RAGWEED IGE QN: <0.1 KU(A)/L
JOHNSON GRASS IGE QN: <0.1 KU(A)/L
MAPLE IGE QN: <0.1 KU(A)/L
MARSH ELDER IGE QN: <0.1 KU(A)/L
MUGWORT IGE QN: <0.1 KU(A)/L
NETTLE IGE QN: <0.1 KU(A)/L
S ROSTRATA IGE QN: <0.1 KU(A)/L
SALTWORT IGE QN: <0.1 KU(A)/L
SILVER BIRCH IGE QN: <0.1 KU(A)/L
TIMOTHY IGE QN: <0.1 KU(A)/L
WHITE ASH IGE QN: <0.1 KU(A)/L
WHITE ELM IGE QN: <0.1 KU(A)/L
WHITE MULBERRY IGE QN: <0.1 KU(A)/L
WHITE OAK IGE QN: <0.1 KU(A)/L

## 2024-11-01 LAB
DLCOCOR-%PRED-PRE: 106 %
DLCOCOR-PRE: 23.23 ML/MIN/MMHG
DLCOUNC-%PRED-PRE: 105 %
DLCOUNC-PRE: 23.01 ML/MIN/MMHG
DLCOUNC-PRED: 21.71 ML/MIN/MMHG
ERV-%PRED-PRE: 72 %
ERV-PRE: 0.91 L
ERV-PRED: 1.25 L
EXPTIME-PRE: 5.91 SEC
FEF2575-%PRED-POST: 76 %
FEF2575-%PRED-PRE: 71 %
FEF2575-POST: 2.07 L/SEC
FEF2575-PRE: 1.93 L/SEC
FEF2575-PRED: 2.72 L/SEC
FEFMAX-%PRED-PRE: 104 %
FEFMAX-PRE: 7.37 L/SEC
FEFMAX-PRED: 7.06 L/SEC
FEV1-%PRED-PRE: 97 %
FEV1-PRE: 2.69 L
FEV1FEV6-PRE: 73 %
FEV1FEV6-PRED: 82 %
FEV1FVC-PRE: 73 %
FEV1FVC-PRED: 81 %
FEV1SVC-PRE: 73 %
FEV1SVC-PRED: 77 %
FIFMAX-PRE: 5.93 L/SEC
FVC-%PRED-PRE: 108 %
FVC-PRE: 3.7 L
FVC-PRED: 3.42 L
IC-%PRED-PRE: 110 %
IC-PRE: 2.77 L
IC-PRED: 2.5 L
VA-%PRED-PRE: 107 %
VA-PRE: 5.51 L
VC-%PRED-PRE: 102 %
VC-PRE: 3.68 L
VC-PRED: 3.6 L

## 2024-11-05 ENCOUNTER — LAB REQUISITION (OUTPATIENT)
Dept: LAB | Facility: CLINIC | Age: 49
End: 2024-11-05
Payer: COMMERCIAL

## 2024-11-05 LAB
BASOPHILS # BLD AUTO: 0.1 10E3/UL (ref 0–0.2)
BASOPHILS NFR BLD AUTO: 1 %
EOSINOPHIL # BLD AUTO: 1.1 10E3/UL (ref 0–0.7)
EOSINOPHIL NFR BLD AUTO: 16 %
ERYTHROCYTE [DISTWIDTH] IN BLOOD BY AUTOMATED COUNT: 13 % (ref 10–15)
HCT VFR BLD AUTO: 39.1 % (ref 35–53)
HGB BLD-MCNC: 12.9 G/DL (ref 11.7–17.7)
IMM GRANULOCYTES # BLD: 0 10E3/UL
IMM GRANULOCYTES NFR BLD: 0 %
LYMPHOCYTES # BLD AUTO: 2.6 10E3/UL (ref 0.8–5.3)
LYMPHOCYTES NFR BLD AUTO: 37 %
MCH RBC QN AUTO: 31.4 PG (ref 26.5–33)
MCHC RBC AUTO-ENTMCNC: 33 G/DL (ref 31.5–36.5)
MCV RBC AUTO: 95 FL (ref 78–100)
MONOCYTES # BLD AUTO: 0.7 10E3/UL (ref 0–1.3)
MONOCYTES NFR BLD AUTO: 10 %
NEUTROPHILS # BLD AUTO: 2.6 10E3/UL (ref 1.6–8.3)
NEUTROPHILS NFR BLD AUTO: 36 %
NRBC # BLD AUTO: 0 10E3/UL
NRBC BLD AUTO-RTO: 0 /100
PLATELET # BLD AUTO: 305 10E3/UL (ref 150–450)
RBC # BLD AUTO: 4.11 10E6/UL (ref 3.8–5.9)
WBC # BLD AUTO: 7.2 10E3/UL (ref 4–11)

## 2024-11-05 PROCEDURE — 85025 COMPLETE CBC W/AUTO DIFF WBC: CPT | Mod: ORL | Performed by: STUDENT IN AN ORGANIZED HEALTH CARE EDUCATION/TRAINING PROGRAM

## 2024-12-11 ENCOUNTER — VIRTUAL VISIT (OUTPATIENT)
Dept: GASTROENTEROLOGY | Facility: CLINIC | Age: 49
End: 2024-12-11
Attending: PHYSICIAN ASSISTANT
Payer: COMMERCIAL

## 2024-12-11 DIAGNOSIS — K57.30 SEGMENTAL COLITIS ASSOCIATED WITH DIVERTICULOSIS (H): Primary | ICD-10-CM

## 2024-12-11 DIAGNOSIS — K50.10 SEGMENTAL COLITIS WITHOUT COMPLICATION (H): ICD-10-CM

## 2024-12-11 DIAGNOSIS — E71.312 SCAD (SHORT-CHAIN ACYL-COA DEHYDROGENASE DEFICIENCY) (H): ICD-10-CM

## 2024-12-11 DIAGNOSIS — K50.10 SEGMENTAL COLITIS ASSOCIATED WITH DIVERTICULOSIS (H): Primary | ICD-10-CM

## 2024-12-11 RX ORDER — MESALAMINE 4 G/60ML
4 SUSPENSION RECTAL AT BEDTIME
Qty: 1800 ML | Refills: 11 | Status: SHIPPED | OUTPATIENT
Start: 2024-12-11

## 2024-12-11 RX ORDER — MESALAMINE 1.2 G/1
4800 TABLET, DELAYED RELEASE ORAL DAILY
Qty: 360 TABLET | Refills: 3 | Status: SHIPPED | OUTPATIENT
Start: 2024-12-11

## 2024-12-11 NOTE — LETTER
12/11/2024      Velma Mcfarlane  [address] [facility] [id number]      Dear Colleague,    Thank you for referring your patient, Velma Mcfarlane, to the [facility] [place]. Please see a copy of my visit note below.    Virtual Visit Details    Type of service:  Video Visit     Originating Location (pt. Location): Home    Distant Location (provider location):  Off-site  Platform used for Video Visit: [facility]    IBD CLINIC VISIT -- follow up    CHIEF COMPLAINT: SCAD evaluation and management    SCAD HISTORY  Age at diagnosis: 44  Extent of disease: SCAD, left sided rui-diverticular inflammation  Prior SCAD Medications: Cipro/flagyl (helped)    DISEASE ASSESSMENT  Labs:  Recent Labs   Lab Test 11/05/24  1230 08/25/24  1328 08/03/24  1249 12/14/23  1334 11/29/23  1602 09/18/23  1101 06/19/19  1009 03/18/19  1652 06/20/18  1611   CRP  --   --   --   --   --   --  <2.9  --  0.6   SED  --   --  8  --  10   < > 9  --  20   HGB 12.9   < > 15.7   < > 14.3   < >  --    < >  --     < > = values in this interval not displayed.     Endoscopic assessment:   Colonoscopy 12/20/2023 with congested erythematous eroded friable mucosa in the sigmoid and distal rectum.  Biopsies show moderate active inflammation.    8/2019 icscope showed Dee 2 colitis from 30 cm-40cm. Diverticulosis in the sigmoid. Rest of colon was normal.     PATH:  SPECIMEN(S):   A: Cecal biopsy   B: Colon biopsy, ascending   C: Colon biopsy, transverse   D: Colon biopsy, descending   E: Sigmoid colon biopsy   F: Rectal biopsy     FINAL DIAGNOSIS:   A. Cecum, biopsy:   Colonic mucosa with no pathologic abnormalities     B. Ascending Colon, Biopsy:   Colonic mucosa with no pathologic abnormalities     C. Transverse Colon, biopsy:   Colonic mucosa with no pathologic abnormalities     D. Descending Colon, biopsy:   Colonic mucosa with no pathologic abnormalities     E. Sigmoid Colon, biopsy:   Colitis  with crypt injury, moderately active, with rectum sparing (see   part F below); consistent with SCAD   (segmental colitisassociated diverticulosis); negative for dysplasia     F. Rectum, biopsy:   Rectal mucosa with no pathologic abnormalities     EGD 4/15/19 Normal, normal biopsies of esophagus, stomach and duodenum  3/25/19 colonoscopy for rectal bleeding: left sided diverticular with edema, erythema, loss of vascular pattern noted in 2 segments (40-36 cm and 30-22 cm from the anus.    CT ab/pelvis 3/5/19:  Renal hypodensities stable from 4/16/19 exam, 2 mm calculus in right kidney, no gastric or small bowel abnormality  Fecal calprotectin: pending  C diff: negative per OSH records    ASSESSMENT/PLAN  Mrs. Salter is a 49 year old woman with history of cholecystectomy, 3 uncomplicated vaginal deliveries, thyroid cysts (per patient), and segmental colitis associated with diverticular disease who is following up for management of her disease.  She is on SCD diet.     Flare Dec 2023 with urgent bloody stools.  Fecal calprotectin greater than 3000.  Flex sig showed moderate active inflammation in the rectosigmoid.  She responded well to a short course of prednisone and restarting her Lialda in addition to continuation of Rowasa enemas.    Current medication: Lialda 4.8 g a day and Rowasa enemas nightly  Last endoscopic disease activity: Colonoscopy 12/20/2023 with congested erythematous eroded friable mucosa in the sigmoid and distal rectum.  Biopsies show moderate active inflammation.    We will proceed with the following plan:     Plan:  --- Continue Lialda 4.8 g/day   --- Continue rowasa enemas  --- Fecal calprotectin to be obtained in the near future.  If normal or near normal we will continue with the above regimen, if persistently elevated we will proceed with a flex sig and pending results discuss alternative advanced IBD therapies  --- Labs CBC, LFTs, CRP, ESR + creatinine this fall    Return to clinic in 6  "months    Thank you for this consultation.  It was a pleasure to participate in the care of this patient; please contact us with any further questions.      Gerhard Nicolas PA-C  Division of Gastroenterology, Hepatology and Nutrition  [facility]      HPI:   Mrs. Salter is a 49 year old woman with history of cholecystectomy, 3 uncomplicated vaginal deliveries, thyroid cysts (per patient), and recent diagnosis of segmental colitis associated with diverticular disease who is establishing care for management of her disease.    Per chart:  Patient seen about 2014 at [facility] for diverticulitis and last seen 5/2019 for follow up of SCAD and nausea.  Patient had colonoscopy 3/2019 with finding of edema, erythema, loss of and loss of vascular patter in 2 segments of left colon with mild to moderate diverticulosis.  Sigmoid biopsies showed mildly active chronic colitis consistent with SCAD.  Patient was treated with 10 day course of cipro/flagyl with improvement in symptoms followed by Lialda 4.8 G daily with possible improvement. On 4/11/19 the patient was found to be c.diff negative.      At time of consultation, patient reported frustation with lack of symptom improvement and communication at prior GI providers office. She presented here.  Patient reports 5 years of alternating diarrhea and constipation along with occassional undigested food in stool and borrygymi.  She reports had initial improvement in symptoms following cipro/flagyl and mesalamine but called in as still was having blood streaked stools for which she was started on rowasa enemas.  She is concerned she is still flaring despite the therapies and putting herself on a low residue \"specific carbohydrate diet\" supplemented with \"high protein predigested cancer shakes\".  She reports weight loss in the setting of biking 12 miles daily and changed diet.    At time of consultation the patient reports two semiformed stools daily with blood streaks in all " stools (10% blood) and with wiping.  She notes one day a week she will have bloating, increased flatus, general malaise, 5-6 watery BM's along with no associated abdominal pain.      Patient also reports 10 years of chronic food getting stuck in her esophagus until drinks some water with the food.  It doesn't happen with draper, but rather typically with meats.  She has never had a food impaction.  Happens with every meal.  No odynophagia, no nausea currently, no vomiting, rare reflux/heartburn. PPI not helpful in the past.  Reports was having nausea but improved with simple carbohydrate diet.  Reports when was nauseated, it was typically in the morning and improved with a protein breakfast sandwich.    Prior report of dysphagia and nausea,  Had upper endoscopy 2019, 1 gastric polyp (fundic gland).  Separate mid/distal esophageal, gastric and duodenal biopsies negative.    Patient notes Hx of thyroid cysts and borderline TSH levels.       Social hx   -no smoking  -1-2 x month nsaids  -rare alcohol   -no illicit  -Bike 12 miles a day, weights    Fam Hx  Mother with history colitis - no issues in years, colon polyps  Maternal grandfather  of colon cancer at age 76  Both parents have diverticulitis    Interval history, 2019  In August, started cipro x 2 weeks, flagyl x 2 weeks, lialda and prednisone. Notices increased pain when delays Lialda by a few hours (increased pain). Now down to prednisone 10 mg daily, going to 5 mg daily on Friday. Feels much improved. Blood has stopped; left sided pain has improved. Gained 5 lb since August. Continues on SCD; met with Rebekah Browne.    No upcoming travel plans.     Interval history, 2019  Continues on SCD, which has helped a lot.     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   Off prednisone x 3 weeks. Continues on Cortenema daily x 2 months.   Also on Lialda 4.8 g daily.     Having 2 BMs per day (non-bloody, well-formed). No blood in stool  since August.   Gained a few lb since Sep.     Interval history, 4/2020 (virtual visit)  Got sick in Dec with wheezing and was dx with walking PNA . Treated with abx and then prednisone x 5 days.   Continues on 5 mg prednisone daily and has difficulty weaning off it due to recurrent abd pain. Has been on prednisone 5 mg x 1 month.     Having 2 BMs per day, non bloody, loose.     For SCAD, currently on Lialda 4.8g and Rowasa enemas.     Interval history, 12/2020 (virtual visit)  In beginning of December, had a flare in the setting of stress, with increased bowel frequency and LLQ abd pain. Started on Rowasa enemas with significant help. But around 12/25 ate a piece of cake and developed postprandial nausea and epigastric discomfort.      Continues on Lialda 4.8g daily and daily Rowasa enemas.     Currently having 2 BMs per day, soft. No blood.     EGD in 4/2019 was normal.     Interval history, 3/24/22  She has continued with SCD (since Aug 2019) and feels that symptoms are quite stable. She continues with lialda 4.8g daily and rowasa enemas every other night. Currently having 2 stools per day, formed no blood. No abdominal pain. If she deviates from her diet, then she may have some deviation from her baseline, but overall very stable.     She has bad migraines and is currently has one today.    Interval hx 10/26/22  Symptoms over the last 6 months. 1-3 stools per day, slightly looser than normal, variable consistency from more firm to looser stools.  No blood in the stool.  No urgency or nighttime stool.  She has been consistent and compliant with lialda and rowasa. Over the last 6 months she has had discomfort in lower left quadrant. Very tired, minimal appetite. Trying to minimize grains and sugar.    A lot of stress with father in law passing and coworker passing suddenly. Submitted message through portal to get rx for steroids and antibiotics which has worked well in the past.     Interval hx  "3/29/23  Cipro/flagyl allowed for improvement (rifaximin denied). Has been quite stable other than 2 weeks at end of Feb 2023. Bowel pattern fluctuates with what she perceives to be her stress level. Avg 2 BM daily that are formed. No blood in the stool. She continues with lialda 4.8g daily and rowasa daily.    Interval hx 9/12/23  Earlier last week began having abdominal discomfort in lower abdomen.  Over the weekend had moderate to severe pain across abdomen. Had chills, but did not take temp.Only doing rowasa enemas, self d/c'd lialda March 2023 bc due for blood work but ultimately did not complete. Rested and hydrated and seems to be improving. LLQ pain is still there and slightly worse than normal but improving. No fever or chills.    Interval hx 11/14/23  She continues with rowasa enemas (Holding lialda for now). Bowel pattern is stable, no urgency. No urgency, blood in the stool or nighttime stools. No fevers or chills.   Continues on SCD.  Busy with school, computer science degree.     Interval hx 1/17/24  Had a flare shortly after last visit prompting a flexible sigmoidoscopy showing moderate active inflammation in the rectosigmoid requiring prednisone.  She was able to successfully taper off prednisone and continue with Lialda and Rowasa enemas.  Currently having 2 stools per day, soft serve. No blood in the stool for the last 2 weeks. Off prednisone now. Consistent with Rowasa enemas and Lialda daily. No abdominal pain. No fevers.     Interval hx 5/14/24  Had an uptick of symptoms around finals, but now feeling better. She is currently having 2 stools per day, formed and no blood (soft serve), no urgency or nighttime stools. Continues with rowasa enemas at night and consistent with lialda 4.8 g daily.    Interval hx 7/23/24  Had uptick of symptoms, which she feels like the stomach upset was \"higher up,\" however did not affect frequency or consistency of stools. Baseline stool pattern (2 per day). No blood " in the stool. Continues with rowasa enemas at night and consistent with lialda 4.8 g daily. No EIM.    Interval hx 12/11/24  Had asthma exacerbation this past summer requiring steroids. She has completed the taper and has done well from a GI standpoint. Baseline stool pattern (2 per day). No blood in the stool. Continues with rowasa enemas at night and consistent with lialda 4.8 g daily. No EIM.    ROS:     ROS: 10 point ROS neg other than the symptoms noted above in the HPI.      Extra intestinal manifestations of IBD:  No uveitis/episcleritis  No aphthous ulcers   No arthritis   No erythema nodosum/pyoderma gangrenosum.     PERTINENT PAST MEDICAL HISTORY:  None     PREVIOUS SURGERIES:  Past Surgical History:   Procedure Laterality Date     CHOLECYSTECTOMY       COLONOSCOPY       COLONOSCOPY N/A 8/12/2019    Procedure: COLONOSCOPY, WITH POLYPECTOMY AND BIOPSY;  Surgeon: Yolanda Molina MD;  Location: [facility] OR     HC REMOVAL GALLBLADDER      Description: Cholecystectomy;  Recorded: 04/06/2011;     Surgical hx  -Cholecystectomy 2010  -3 children - vaginal x 3, Bladder sling placed 2016    PREVIOUS ENDOSCOPY:  See above    ALLERGIES:     Allergies   Allergen Reactions     Sulfa Antibiotics Hives     Other reaction(s): *Unknown     Tetracycline Hives       PERTINENT MEDICATIONS:    Current Outpatient Medications:      albuterol (PROAIR HFA/PROVENTIL HFA/VENTOLIN HFA) 108 (90 Base) MCG/ACT inhaler, Inhale 2 puffs into the lungs every 4 hours as needed for shortness of breath, wheezing or cough, Disp: 18 g, Rfl: 0     amitriptyline (ELAVIL) 25 MG tablet, Take 25 mg by mouth at bedtime., Disp: , Rfl:      butalbital-acetaminophen-caffeine (FIORICET/ESGIC) -40 MG tablet, Take 1 tablet by mouth every 6 hours as needed for migraine., Disp: , Rfl:      cholecalciferol (VITAMIN D3) 125 mcg (5000 units) capsule, Take by mouth daily., Disp: , Rfl:      fluticasone-salmeterol (ADVAIR) 250-50 MCG/ACT inhaler, Inhale 1 puff  into the lungs every 12 hours., Disp: , Rfl:      mesalamine (LIALDA) 1.2 g DR tablet, Take 4 tablets (4,800 mg) by mouth daily, Disp: 360 tablet, Rfl: 3     mesalamine (ROWASA) 4 g enema, Place 1 enema (4 g) rectally at bedtime, Disp: 1800 mL, Rfl: 11    SOCIAL HISTORY:  Social History     Socioeconomic History     Marital status:      Spouse name: Not on file     Number of children: Not on file     Years of education: Not on file     Highest education level: Not on file   Occupational History     Not on file   Tobacco Use     Smoking status: Never     Passive exposure: Past (Dad was a smoker)     Smokeless tobacco: Never   Vaping Use     Vaping status: Never Used   Substance and Sexual Activity     Alcohol use: Yes     Comment: occasional     Drug use: Yes     Types: Marijuana     Comment: uses wax and vape     Sexual activity: Not on file   Other Topics Concern     Parent/sibling w/ CABG, MI or angioplasty before 65F 55M? Not Asked   Social History Narrative     Not on file     Social Drivers of Health     Financial Resource Strain: Low Risk  (8/26/2024)    Financial Resource Strain      Within the past 12 months, have you or your family members you live with been unable to get utilities (heat, electricity) when it was really needed?: No   Food Insecurity: Low Risk  (8/26/2024)    Food Insecurity      Within the past 12 months, did you worry that your food would run out before you got money to buy more?: No      Within the past 12 months, did the food you bought just not last and you didn t have money to get more?: No   Transportation Needs: Low Risk  (8/26/2024)    Transportation Needs      Within the past 12 months, has lack of transportation kept you from medical appointments, getting your medicines, non-medical meetings or appointments, work, or from getting things that you need?: No   Physical Activity: Not on file   Stress: Not on file   Social Connections: Not on file   Interpersonal Safety: Low  Risk  (8/26/2024)    Interpersonal Safety      Do you feel physically and emotionally safe where you currently live?: Yes      Within the past 12 months, have you been hit, slapped, kicked or otherwise physically hurt by someone?: No      Within the past 12 months, have you been humiliated or emotionally abused in other ways by your partner or ex-partner?: No   Recent Concern: Interpersonal Safety - High Risk (8/26/2024)    Interpersonal Safety      Do you feel physically and emotionally safe where you currently live?: No      Within the past 12 months, have you been hit, slapped, kicked or otherwise physically hurt by someone?: No      Within the past 12 months, have you been humiliated or emotionally abused in other ways by your partner or ex-partner?: No   Housing Stability: Low Risk  (8/26/2024)    Housing Stability      Do you have housing? : Yes      Are you worried about losing your housing?: No     FAMILY HISTORY:  Family History   Problem Relation Age of Onset     Diverticulitis Father      Inflammatory Bowel Disease No family hx of      Colon Cancer No family hx of      Coronary Artery Disease Father      Cerebrovascular Disease Maternal Grandfather      Cerebrovascular Disease Paternal Grandfather        PHYSICAL EXAMINATION:  Constitutional: aaox3, cooperative, pleasant, not dyspneic/diaphoretic, no acute distress  Vitals reviewed: There were no vitals taken for this visit.  Wt:   Wt Readings from Last 2 Encounters:   10/23/24 64.2 kg (141 lb 9.6 oz)   08/26/24 60 kg (132 lb 4.4 oz)      Constitutional - general appearance is well and in no acute distress. Body habitus normal  Eyes - No redness or discharge  Respiratory - No cough, unlabored breathing  Musculoskeletal - range of motion intact: Neck and arms  Skin - No discoloration or lesions  Neurological - No tremors, headaches  Psychiatric - No anxiety, alert & oriented    PERTINENT STUDIES:    Most recent hepatic panel:  Recent Labs   Lab Test  08/26/24  0837 08/25/24  2336   ALT 14 15   AST 15 20     Most recent creatinine:  Recent Labs   Lab Test 08/27/24  0542 08/26/24  0837   CR 0.63 0.54                           Again, thank you for allowing me to participate in the care of your patient.        Sincerely,        Gerhard Nicolas PA-C

## 2024-12-11 NOTE — NURSING NOTE
Current patient location: [address] [place] [id number]    Is the patient currently in the state of [place]? YES    Visit mode:VIDEO    If the visit is dropped, the patient can be reconnected by:VIDEO VISIT: Text to cell phone:   Telephone Information:   Mobile [phone]       Will anyone else be joining the visit? NO  (If patient encounters technical issues they should call [phone]    Are changes needed to the allergy or medication list? No    Are refills needed on medications prescribed by this physician? NO    Rooming Documentation:  Questionnaire(s) completed    Reason for visit: RECHECK    Viridiana SÁNCHEZF

## 2024-12-11 NOTE — PROGRESS NOTES
Virtual Visit Details    Type of service:  Video Visit     Originating Location (pt. Location): Home    Distant Location (provider location):  Off-site  Platform used for Video Visit: [facility]    IBD CLINIC VISIT -- follow up    CHIEF COMPLAINT: SCAD evaluation and management    SCAD HISTORY  Age at diagnosis: 44  Extent of disease: SCAD, left sided rui-diverticular inflammation  Prior SCAD Medications: Cipro/flagyl (helped)    DISEASE ASSESSMENT  Labs:  Recent Labs   Lab Test 11/05/24  1230 08/25/24  1328 08/03/24  1249 12/14/23  1334 11/29/23  1602 09/18/23  1101 06/19/19  1009 03/18/19  1652 06/20/18  1611   CRP  --   --   --   --   --   --  <2.9  --  0.6   SED  --   --  8  --  10   < > 9  --  20   HGB 12.9   < > 15.7   < > 14.3   < >  --    < >  --     < > = values in this interval not displayed.     Endoscopic assessment:   Colonoscopy 12/20/2023 with congested erythematous eroded friable mucosa in the sigmoid and distal rectum.  Biopsies show moderate active inflammation.    8/2019 icscope showed Dee 2 colitis from 30 cm-40cm. Diverticulosis in the sigmoid. Rest of colon was normal.     PATH:  SPECIMEN(S):   A: Cecal biopsy   B: Colon biopsy, ascending   C: Colon biopsy, transverse   D: Colon biopsy, descending   E: Sigmoid colon biopsy   F: Rectal biopsy     FINAL DIAGNOSIS:   A. Cecum, biopsy:   Colonic mucosa with no pathologic abnormalities     B. Ascending Colon, Biopsy:   Colonic mucosa with no pathologic abnormalities     C. Transverse Colon, biopsy:   Colonic mucosa with no pathologic abnormalities     D. Descending Colon, biopsy:   Colonic mucosa with no pathologic abnormalities     E. Sigmoid Colon, biopsy:   Colitis with crypt injury, moderately active, with rectum sparing (see   part F below); consistent with SCAD   (segmental colitisassociated diverticulosis); negative for dysplasia     F. Rectum, biopsy:   Rectal mucosa with no pathologic abnormalities     EGD 4/15/19 Normal, normal biopsies of  esophagus, stomach and duodenum  3/25/19 colonoscopy for rectal bleeding: left sided diverticular with edema, erythema, loss of vascular pattern noted in 2 segments (40-36 cm and 30-22 cm from the anus.    CT ab/pelvis 3/5/19:  Renal hypodensities stable from 4/16/19 exam, 2 mm calculus in right kidney, no gastric or small bowel abnormality  Fecal calprotectin: pending  C diff: negative per OSH records    ASSESSMENT/PLAN  Mrs. Salter is a 49 year old woman with history of cholecystectomy, 3 uncomplicated vaginal deliveries, thyroid cysts (per patient), and segmental colitis associated with diverticular disease who is following up for management of her disease.  She is on SCD diet.     Flare Dec 2023 with urgent bloody stools.  Fecal calprotectin greater than 3000.  Flex sig showed moderate active inflammation in the rectosigmoid.  She responded well to a short course of prednisone and restarting her Lialda in addition to continuation of Rowasa enemas.    Current medication: Lialda 4.8 g a day and Rowasa enemas nightly  Last endoscopic disease activity: Colonoscopy 12/20/2023 with congested erythematous eroded friable mucosa in the sigmoid and distal rectum.  Biopsies show moderate active inflammation.    We will proceed with the following plan:     Plan:  --- Continue Lialda 4.8 g/day   --- Continue rowasa enemas  --- Fecal calprotectin to be obtained in the near future.  If normal or near normal we will continue with the above regimen, if persistently elevated we will proceed with a flex sig and pending results discuss alternative advanced IBD therapies  --- Labs CBC, LFTs, CRP, ESR + creatinine this fall    Return to clinic in 6 months    Thank you for this consultation.  It was a pleasure to participate in the care of this patient; please contact us with any further questions.      Gerhard Nicolas PA-C  Division of Gastroenterology, Hepatology and Nutrition  [facility]      HPI:   Mrs. Salter is a 49  "year old woman with history of cholecystectomy, 3 uncomplicated vaginal deliveries, thyroid cysts (per patient), and recent diagnosis of segmental colitis associated with diverticular disease who is establishing care for management of her disease.    Per chart:  Patient seen about [facility] at [facility] for diverticulitis and last seen 5/2019 for follow up of SCAD and nausea.  Patient had colonoscopy 3/2019 with finding of edema, erythema, loss of and loss of vascular patter in 2 segments of left colon with mild to moderate diverticulosis.  Sigmoid biopsies showed mildly active chronic colitis consistent with SCAD.  Patient was treated with 10 day course of cipro/flagyl with improvement in symptoms followed by Lialda 4.8 G daily with possible improvement. On 4/11/19 the patient was found to be c.diff negative.      At time of consultation, patient reported frustation with lack of symptom improvement and communication at prior GI providers office. She presented here.  Patient reports 5 years of alternating diarrhea and constipation along with occassional undigested food in stool and borrygymi.  She reports had initial improvement in symptoms following cipro/flagyl and mesalamine but called in as still was having blood streaked stools for which she was started on rowasa enemas.  She is concerned she is still flaring despite the therapies and putting herself on a low residue \"specific carbohydrate diet\" supplemented with \"high protein predigested cancer shakes\".  She reports weight loss in the setting of biking 12 miles daily and changed diet.    At time of consultation the patient reports two semiformed stools daily with blood streaks in all stools (10% blood) and with wiping.  She notes one day a week she will have bloating, increased flatus, general malaise, 5-6 watery BM's along with no associated abdominal pain.      Patient also reports 10 years of chronic food getting stuck in her esophagus until drinks some water with " the food.  It doesn't happen with draper, but rather typically with meats.  She has never had a food impaction.  Happens with every meal.  No odynophagia, no nausea currently, no vomiting, rare reflux/heartburn. PPI not helpful in the past.  Reports was having nausea but improved with simple carbohydrate diet.  Reports when was nauseated, it was typically in the morning and improved with a protein breakfast sandwich.    Prior report of dysphagia and nausea,  Had upper endoscopy 2019, 1 gastric polyp (fundic gland).  Separate mid/distal esophageal, gastric and duodenal biopsies negative.    Patient notes Hx of thyroid cysts and borderline TSH levels.       Social hx   -no smoking  -1-2 x month nsaids  -rare alcohol   -no illicit  -Bike 12 miles a day, weights    Fam Hx  Mother with history colitis - no issues in years, colon polyps  Maternal grandfather  of colon cancer at age 76  Both parents have diverticulitis    Interval history, 2019  In August, started cipro x 2 weeks, flagyl x 2 weeks, lialda and prednisone. Notices increased pain when delays Lialda by a few hours (increased pain). Now down to prednisone 10 mg daily, going to 5 mg daily on Friday. Feels much improved. Blood has stopped; left sided pain has improved. Gained 5 lb since August. Continues on SCD; met with Rebekah Browne.    No upcoming travel plans.     Interval history, 2019  Continues on SCD, which has helped a lot.     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   Off prednisone x 3 weeks. Continues on Cortenema daily x 2 months.   Also on Lialda 4.8 g daily.     Having 2 BMs per day (non-bloody, well-formed). No blood in stool since August.   Gained a few lb since Sep.     Interval history, 2020 (virtual visit)  Got sick in Dec with wheezing and was dx with walking PNA . Treated with abx and then prednisone x 5 days.   Continues on 5 mg prednisone daily and has difficulty weaning off it due to recurrent abd  pain. Has been on prednisone 5 mg x 1 month.     Having 2 BMs per day, non bloody, loose.     For SCAD, currently on Lialda 4.8g and Rowasa enemas.     Interval history, 12/2020 (virtual visit)  In beginning of December, had a flare in the setting of stress, with increased bowel frequency and LLQ abd pain. Started on Rowasa enemas with significant help. But around 12/25 ate a piece of cake and developed postprandial nausea and epigastric discomfort.      Continues on Lialda 4.8g daily and daily Rowasa enemas.     Currently having 2 BMs per day, soft. No blood.     EGD in 4/2019 was normal.     Interval history, 3/24/22  She has continued with SCD (since Aug 2019) and feels that symptoms are quite stable. She continues with lialda 4.8g daily and rowasa enemas every other night. Currently having 2 stools per day, formed no blood. No abdominal pain. If she deviates from her diet, then she may have some deviation from her baseline, but overall very stable.     She has bad migraines and is currently has one today.    Interval hx 10/26/22  Symptoms over the last 6 months. 1-3 stools per day, slightly looser than normal, variable consistency from more firm to looser stools.  No blood in the stool.  No urgency or nighttime stool.  She has been consistent and compliant with lialda and rowasa. Over the last 6 months she has had discomfort in lower left quadrant. Very tired, minimal appetite. Trying to minimize grains and sugar.    A lot of stress with father in law passing and coworker passing suddenly. Submitted message through portal to get rx for steroids and antibiotics which has worked well in the past.     Interval hx 3/29/23  Cipro/flagyl allowed for improvement (rifaximin denied). Has been quite stable other than 2 weeks at end of Feb 2023. Bowel pattern fluctuates with what she perceives to be her stress level. Avg 2 BM daily that are formed. No blood in the stool. She continues with lialda 4.8g daily and rowasa  "daily.    Interval hx 9/12/23  Earlier last week began having abdominal discomfort in lower abdomen.  Over the weekend had moderate to severe pain across abdomen. Had chills, but did not take temp.Only doing rowasa enemas, self d/c'd lialda March 2023 bc due for blood work but ultimately did not complete. Rested and hydrated and seems to be improving. LLQ pain is still there and slightly worse than normal but improving. No fever or chills.    Interval hx 11/14/23  She continues with rowasa enemas (Holding lialda for now). Bowel pattern is stable, no urgency. No urgency, blood in the stool or nighttime stools. No fevers or chills.   Continues on SCD.  Busy with school, computer science degree.     Interval hx 1/17/24  Had a flare shortly after last visit prompting a flexible sigmoidoscopy showing moderate active inflammation in the rectosigmoid requiring prednisone.  She was able to successfully taper off prednisone and continue with Lialda and Rowasa enemas.  Currently having 2 stools per day, soft serve. No blood in the stool for the last 2 weeks. Off prednisone now. Consistent with Rowasa enemas and Lialda daily. No abdominal pain. No fevers.     Interval hx 5/14/24  Had an uptick of symptoms around finals, but now feeling better. She is currently having 2 stools per day, formed and no blood (soft serve), no urgency or nighttime stools. Continues with rowasa enemas at night and consistent with lialda 4.8 g daily.    Interval hx 7/23/24  Had uptick of symptoms, which she feels like the stomach upset was \"higher up,\" however did not affect frequency or consistency of stools. Baseline stool pattern (2 per day). No blood in the stool. Continues with rowasa enemas at night and consistent with lialda 4.8 g daily. No EIM.    Interval hx 12/11/24  Had asthma exacerbation this past summer requiring steroids. She has completed the taper and has done well from a GI standpoint. Baseline stool pattern (2 per day). No blood in " the stool. Continues with rowasa enemas at night and consistent with lialda 4.8 g daily. No EIM.    ROS:     ROS: 10 point ROS neg other than the symptoms noted above in the HPI.      Extra intestinal manifestations of IBD:  No uveitis/episcleritis  No aphthous ulcers   No arthritis   No erythema nodosum/pyoderma gangrenosum.     PERTINENT PAST MEDICAL HISTORY:  None     PREVIOUS SURGERIES:  Past Surgical History:   Procedure Laterality Date    CHOLECYSTECTOMY      COLONOSCOPY      COLONOSCOPY N/A 8/12/2019    Procedure: COLONOSCOPY, WITH POLYPECTOMY AND BIOPSY;  Surgeon: Yolanda Molina MD;  Location: [facility] OR    HC REMOVAL GALLBLADDER      Description: Cholecystectomy;  Recorded: 04/06/2011;     Surgical hx  -Cholecystectomy 2010  -3 children - vaginal x 3, Bladder sling placed 2016    PREVIOUS ENDOSCOPY:  See above    ALLERGIES:     Allergies   Allergen Reactions    Sulfa Antibiotics Hives     Other reaction(s): *Unknown    Tetracycline Hives       PERTINENT MEDICATIONS:    Current Outpatient Medications:     albuterol (PROAIR HFA/PROVENTIL HFA/VENTOLIN HFA) 108 (90 Base) MCG/ACT inhaler, Inhale 2 puffs into the lungs every 4 hours as needed for shortness of breath, wheezing or cough, Disp: 18 g, Rfl: 0    amitriptyline (ELAVIL) 25 MG tablet, Take 25 mg by mouth at bedtime., Disp: , Rfl:     butalbital-acetaminophen-caffeine (FIORICET/ESGIC) -40 MG tablet, Take 1 tablet by mouth every 6 hours as needed for migraine., Disp: , Rfl:     cholecalciferol (VITAMIN D3) 125 mcg (5000 units) capsule, Take by mouth daily., Disp: , Rfl:     fluticasone-salmeterol (ADVAIR) 250-50 MCG/ACT inhaler, Inhale 1 puff into the lungs every 12 hours., Disp: , Rfl:     mesalamine (LIALDA) 1.2 g DR tablet, Take 4 tablets (4,800 mg) by mouth daily, Disp: 360 tablet, Rfl: 3    mesalamine (ROWASA) 4 g enema, Place 1 enema (4 g) rectally at bedtime, Disp: 1800 mL, Rfl: 11    SOCIAL HISTORY:  Social History     Socioeconomic History     Marital status:      Spouse name: Not on file    Number of children: Not on file    Years of education: Not on file    Highest education level: Not on file   Occupational History    Not on file   Tobacco Use    Smoking status: Never     Passive exposure: Past (Dad was a smoker)    Smokeless tobacco: Never   Vaping Use    Vaping status: Never Used   Substance and Sexual Activity    Alcohol use: Yes     Comment: occasional    Drug use: Yes     Types: Marijuana     Comment: uses wax and vape    Sexual activity: Not on file   Other Topics Concern    Parent/sibling w/ CABG, MI or angioplasty before 65F 55M? Not Asked   Social History Narrative    Not on file     Social Drivers of Health     Financial Resource Strain: Low Risk  (8/26/2024)    Financial Resource Strain     Within the past 12 months, have you or your family members you live with been unable to get utilities (heat, electricity) when it was really needed?: No   Food Insecurity: Low Risk  (8/26/2024)    Food Insecurity     Within the past 12 months, did you worry that your food would run out before you got money to buy more?: No     Within the past 12 months, did the food you bought just not last and you didn t have money to get more?: No   Transportation Needs: Low Risk  (8/26/2024)    Transportation Needs     Within the past 12 months, has lack of transportation kept you from medical appointments, getting your medicines, non-medical meetings or appointments, work, or from getting things that you need?: No   Physical Activity: Not on file   Stress: Not on file   Social Connections: Not on file   Interpersonal Safety: Low Risk  (8/26/2024)    Interpersonal Safety     Do you feel physically and emotionally safe where you currently live?: Yes     Within the past 12 months, have you been hit, slapped, kicked or otherwise physically hurt by someone?: No     Within the past 12 months, have you been humiliated or emotionally abused in other ways by your  partner or ex-partner?: No   Recent Concern: Interpersonal Safety - High Risk (8/26/2024)    Interpersonal Safety     Do you feel physically and emotionally safe where you currently live?: No     Within the past 12 months, have you been hit, slapped, kicked or otherwise physically hurt by someone?: No     Within the past 12 months, have you been humiliated or emotionally abused in other ways by your partner or ex-partner?: No   Housing Stability: Low Risk  (8/26/2024)    Housing Stability     Do you have housing? : Yes     Are you worried about losing your housing?: No     FAMILY HISTORY:  Family History   Problem Relation Age of Onset    Diverticulitis Father     Inflammatory Bowel Disease No family hx of     Colon Cancer No family hx of     Coronary Artery Disease Father     Cerebrovascular Disease Maternal Grandfather     Cerebrovascular Disease Paternal Grandfather        PHYSICAL EXAMINATION:  Constitutional: aaox3, cooperative, pleasant, not dyspneic/diaphoretic, no acute distress  Vitals reviewed: There were no vitals taken for this visit.  Wt:   Wt Readings from Last 2 Encounters:   10/23/24 64.2 kg (141 lb 9.6 oz)   08/26/24 60 kg (132 lb 4.4 oz)      Constitutional - general appearance is well and in no acute distress. Body habitus normal  Eyes - No redness or discharge  Respiratory - No cough, unlabored breathing  Musculoskeletal - range of motion intact: Neck and arms  Skin - No discoloration or lesions  Neurological - No tremors, headaches  Psychiatric - No anxiety, alert & oriented    PERTINENT STUDIES:    Most recent hepatic panel:  Recent Labs   Lab Test 08/26/24  0837 08/25/24  2336   ALT 14 15   AST 15 20     Most recent creatinine:  Recent Labs   Lab Test 08/27/24  0542 08/26/24  0837   CR 0.63 0.54

## 2024-12-11 NOTE — PATIENT INSTRUCTIONS
It was a pleasure taking care of you today.  I've included a brief summary of our discussion and care plan from today's visit below.  Please review this information with your primary care provider.  ______________________________________________________________________    My recommendations are summarized as follows:    -- Continue lialda 4 pills daily and rowasa nightly  -- stool sample after school is over this semester  -- Patient with IBD we recommend supplementation vitamin D 1000 units daily and calcium 500 mg twice daily.  -- No NSAIDs (ibuprofen, or anything containing ibuprofen)    For additional resources about inflammatory bowel disease visit http://www.crohnscolitisfoundation.org/    To learn more about Diet and Nutrition in the setting of IBD, check out some of these resources:  https://www.crohnscolitisfoundation.org/diet-and-nutrition/what-should-i-eat  https://www.nimbal.org/  https://ntforibd.org/    Return to GI Clinic in 6 months to review your progress.    ______________________________________________________________________    How do I schedule labs, imaging studies, or procedures that were ordered in clinic today?     Labs: To schedule lab appointment at the [facility], use my chart or call [phone]. If you have a [place] lab closer to home where you are regularly seen you can give them a call.     Procedures: If a colonoscopy, upper endoscopy, breath test, esophageal manometry, or pH impedence was ordered today, our endoscopy team will call you to schedule this. If you have not heard from our endoscopy team within a week, please call [phone] to schedule.     Imaging Studies: If you were scheduled for a CT scan, X-ray, MRI, ultrasound, HIDA scan or other imaging study, please call [phone] to have this scheduled.     Referral: If a referral to another specialty was ordered, expect a phone call or follow instructions above. If you have not heard from anyone  regarding your referral in a week, please call our clinic to check the status.     Who do I call with any questions after my visit?  Please be in touch if there are any further questions that arise following today's visit.  There are multiple ways to contact your gastroenterology care team.      During business hours, you may reach a Gastroenterology nurse at [phone]    To schedule or reschedule an appointment, please call [phone].     You can always send a secure message through [employer]anE[employer].  [employer] messages are answered by your nurse or doctor typically within 24 hours.  Please allow extra time on weekends and holidays.      For urgent/emergent questions after business hours, you may reach the on-call GI Fellow by contacting the [facility]  at [phone].     How will I get the results of any tests ordered?    You will receive all of your results.  If you have signed up for [employer], any tests ordered at your visit will be available to you after your physician reviews them.  Typically this takes 1-2 weeks.  If there are urgent results that require a change in your care plan, your physician or nurse will call you to discuss the next steps.      What is [employer]an[employer]?  XanEdu is a secure way for you to access all of your healthcare records from the [facility].  It is a web based computer program, so you can sign on to it from any location.  It also allows you to send secure messages to your care team.  I recommend signing up for XanEdu access if you have not already done so and are comfortable with using a computer.         Sincerely,    Gerhard Nicolas PA-C  [facility]  Division of Gastroenterology

## 2025-03-01 ENCOUNTER — HEALTH MAINTENANCE LETTER (OUTPATIENT)
Age: 50
End: 2025-03-01

## 2025-04-19 ENCOUNTER — HEALTH MAINTENANCE LETTER (OUTPATIENT)
Age: 50
End: 2025-04-19

## 2025-05-13 ENCOUNTER — VIRTUAL VISIT (OUTPATIENT)
Dept: GASTROENTEROLOGY | Facility: CLINIC | Age: 50
End: 2025-05-13
Attending: PHYSICIAN ASSISTANT
Payer: COMMERCIAL

## 2025-05-13 DIAGNOSIS — K51.30 ULCERATIVE RECTOSIGMOIDITIS WITHOUT COMPLICATION (H): Primary | ICD-10-CM

## 2025-05-13 PROCEDURE — 1125F AMNT PAIN NOTED PAIN PRSNT: CPT | Mod: 95 | Performed by: PHYSICIAN ASSISTANT

## 2025-05-13 PROCEDURE — 98006 SYNCH AUDIO-VIDEO EST MOD 30: CPT | Performed by: PHYSICIAN ASSISTANT

## 2025-05-13 NOTE — PROGRESS NOTES
Virtual Visit Details    Type of service:  Video Visit     Originating Location (pt. Location): Home    Distant Location (provider location):  Off-site  Platform used for Video Visit: [facility]    IBD CLINIC VISIT -- follow up    CHIEF COMPLAINT: SCAD evaluation and management    SCAD HISTORY  Age at diagnosis: 44  Extent of disease: SCAD, left sided rui-diverticular inflammation  Prior SCAD Medications: Cipro/flagyl (helped)    DISEASE ASSESSMENT  Labs:  Recent Labs   Lab Test 11/05/24  1230 08/25/24  1328 08/03/24  1249 12/14/23  1334 11/29/23  1602 09/18/23  1101 06/19/19  1009 03/18/19  1652 06/20/18  1611   CRP  --   --   --   --   --   --  <2.9  --  0.6   SED  --   --  8  --  10   < > 9  --  20   HGB 12.9   < > 15.7   < > 14.3   < >  --    < >  --     < > = values in this interval not displayed.     Endoscopic assessment:   Colonoscopy 12/20/2023 with congested erythematous eroded friable mucosa in the sigmoid and distal rectum.  Biopsies show moderate active inflammation.    8/2019 icscope showed Dee 2 colitis from 30 cm-40cm. Diverticulosis in the sigmoid. Rest of colon was normal.     PATH:  SPECIMEN(S):   A: Cecal biopsy   B: Colon biopsy, ascending   C: Colon biopsy, transverse   D: Colon biopsy, descending   E: Sigmoid colon biopsy   F: Rectal biopsy     FINAL DIAGNOSIS:   A. Cecum, biopsy:   Colonic mucosa with no pathologic abnormalities     B. Ascending Colon, Biopsy:   Colonic mucosa with no pathologic abnormalities     C. Transverse Colon, biopsy:   Colonic mucosa with no pathologic abnormalities     D. Descending Colon, biopsy:   Colonic mucosa with no pathologic abnormalities     E. Sigmoid Colon, biopsy:   Colitis with crypt injury, moderately active, with rectum sparing (see   part F below); consistent with SCAD   (segmental colitisassociated diverticulosis); negative for dysplasia     F. Rectum, biopsy:   Rectal mucosa with no pathologic abnormalities     EGD 4/15/19 Normal, normal biopsies of  esophagus, stomach and duodenum  3/25/19 colonoscopy for rectal bleeding: left sided diverticular with edema, erythema, loss of vascular pattern noted in 2 segments (40-36 cm and 30-22 cm from the anus.    CT ab/pelvis 3/5/19:  Renal hypodensities stable from 4/16/19 exam, 2 mm calculus in right kidney, no gastric or small bowel abnormality  Fecal calprotectin: pending  C diff: negative per OSH records    ASSESSMENT/PLAN  Mrs. Salter is a 50 year old woman with history of cholecystectomy, 3 uncomplicated vaginal deliveries, thyroid cysts (per patient), and segmental colitis associated with diverticular disease who is following up for management of her disease.  She is on SCD diet.     Flare Dec 2023 with urgent bloody stools.  Fecal calprotectin greater than 3000.  Flex sig showed moderate active inflammation in the rectosigmoid.  She responded well to a short course of prednisone and restarting her Lialda in addition to continuation of Rowasa enemas.    Current medication: Lialda 4.8 g a day and Rowasa enemas nightly  Last endoscopic disease activity: Colonoscopy 12/20/2023 with congested erythematous eroded friable mucosa in the sigmoid and distal rectum.  Biopsies show moderate active inflammation.    We will proceed with the following plan:     Plan:  --- Continue Lialda 4.8 g/day   --- Continue rowasa enemas nightly  --- Colonoscopy this summer to assess mucosal healing and if need to change IBD therapies  --- Labs CBC, LFTs, CRP, ESR + creatinine this summer    Return to clinic in 6 months    Thank you for this consultation.  It was a pleasure to participate in the care of this patient; please contact us with any further questions.      Gerhard Nicolas PA-C  Division of Gastroenterology, Hepatology and Nutrition  [facility]      HPI:   Mrs. Salter is a 50 year old woman with history of cholecystectomy, 3 uncomplicated vaginal deliveries, thyroid cysts (per patient), and recent diagnosis of  segmental colitis associated with diverticular disease who is establishing care for management of her disease.     seen at [facility] for diverticulitis and and SCAD.    Colonoscopy 3/2019 with finding of edema, erythema, loss of and loss of vascular patter in 2 segments of left colon with mild to moderate diverticulosis.  Sigmoid biopsies showed mildly active chronic colitis consistent with SCAD.    Patient was treated with 10 day course of cipro/flagyl with improvement in symptoms followed by Lialda 4.8 G daily with possible improvement. On 19 the patient was found to be c.diff negative.      Had a flare end of  prompting a flexible sigmoidoscopy showing moderate active inflammation in the rectosigmoid requiring prednisone.  She was able to successfully taper off prednisone and continue with Lialda and Rowasa enemas.     Most recently, she had an episode of hives beginning 2025, and did a 10 day course of steroids. Then following this she had an asthma exacerbation requiring another steroid burst end of April, just completing steroids 2 days ago.  Currently having 1-2 stools per day. No blood in the stool. No urgency. No new EIM.    Prior report of dysphagia and nausea,  Had upper endoscopy 2019, 1 gastric polyp (fundic gland).  Separate mid/distal esophageal, gastric and duodenal biopsies negative.    Patient notes Hx of thyroid cysts and borderline TSH levels.       Social hx   -no smoking  -1-2 x month nsaids  -rare alcohol   -no illicit  -Bike 12 miles a day, weights    Fam Hx  Mother with history colitis - no issues in years, colon polyps  Maternal grandfather  of colon cancer at age 76  Both parents have diverticulitis    ROS:    ROS: 10 point ROS neg other than the symptoms noted above in the HPI.    Extra intestinal manifestations of IBD:  No uveitis/episcleritis  No aphthous ulcers   No arthritis   No erythema nodosum/pyoderma gangrenosum.     PERTINENT PAST MEDICAL  HISTORY:  None     PREVIOUS SURGERIES:  Past Surgical History:   Procedure Laterality Date    CHOLECYSTECTOMY      COLONOSCOPY      COLONOSCOPY N/A 8/12/2019    Procedure: COLONOSCOPY, WITH POLYPECTOMY AND BIOPSY;  Surgeon: Yolanda Molina MD;  Location: [facility] OR    HC REMOVAL GALLBLADDER      Description: Cholecystectomy;  Recorded: 04/06/2011;     Surgical hx  -Cholecystectomy 2010  -3 children - vaginal x 3, Bladder sling placed 2016    PREVIOUS ENDOSCOPY:  See above    ALLERGIES:     Allergies   Allergen Reactions    Sulfa Antibiotics Hives     Other reaction(s): *Unknown    Tetracycline Hives       PERTINENT MEDICATIONS:    Current Outpatient Medications:     albuterol (PROAIR HFA/PROVENTIL HFA/VENTOLIN HFA) 108 (90 Base) MCG/ACT inhaler, Inhale 2 puffs into the lungs every 4 hours as needed for shortness of breath, wheezing or cough, Disp: 18 g, Rfl: 0    amitriptyline (ELAVIL) 25 MG tablet, Take 25 mg by mouth at bedtime., Disp: , Rfl:     butalbital-acetaminophen-caffeine (FIORICET/ESGIC) -40 MG tablet, Take 1 tablet by mouth every 6 hours as needed for migraine., Disp: , Rfl:     cholecalciferol (VITAMIN D3) 125 mcg (5000 units) capsule, Take by mouth daily., Disp: , Rfl:     fluticasone-salmeterol (ADVAIR) 250-50 MCG/ACT inhaler, Inhale 1 puff into the lungs every 12 hours., Disp: , Rfl:     mesalamine (LIALDA) 1.2 g DR tablet, Take 4 tablets (4,800 mg) by mouth daily., Disp: 360 tablet, Rfl: 3    mesalamine (ROWASA) 4 g enema, Place 1 enema (4 g) rectally at bedtime., Disp: 1800 mL, Rfl: 11    SOCIAL HISTORY:  Social History     Socioeconomic History    Marital status:      Spouse name: Not on file    Number of children: Not on file    Years of education: Not on file    Highest education level: Not on file   Occupational History    Not on file   Tobacco Use    Smoking status: Never     Passive exposure: Past (Dad was a smoker)    Smokeless tobacco: Never   Vaping Use    Vaping status: Never  Used   Substance and Sexual Activity    Alcohol use: Yes     Comment: occasional    Drug use: Yes     Types: Marijuana     Comment: uses wax and vape    Sexual activity: Not on file   Other Topics Concern    Parent/sibling w/ CABG, MI or angioplasty before 65F 55M? Not Asked   Social History Narrative    Not on file     Social Drivers of Health     Financial Resource Strain: Low Risk  (8/26/2024)    Financial Resource Strain     Within the past 12 months, have you or your family members you live with been unable to get utilities (heat, electricity) when it was really needed?: No   Food Insecurity: Low Risk  (8/26/2024)    Food Insecurity     Within the past 12 months, did you worry that your food would run out before you got money to buy more?: No     Within the past 12 months, did the food you bought just not last and you didn t have money to get more?: No   Transportation Needs: Low Risk  (8/26/2024)    Transportation Needs     Within the past 12 months, has lack of transportation kept you from medical appointments, getting your medicines, non-medical meetings or appointments, work, or from getting things that you need?: No   Physical Activity: Not on file   Stress: Not on file   Social Connections: Not on file   Interpersonal Safety: Low Risk  (8/26/2024)    Interpersonal Safety     Do you feel physically and emotionally safe where you currently live?: Yes     Within the past 12 months, have you been hit, slapped, kicked or otherwise physically hurt by someone?: No     Within the past 12 months, have you been humiliated or emotionally abused in other ways by your partner or ex-partner?: No   Recent Concern: Interpersonal Safety - High Risk (8/26/2024)    Interpersonal Safety     Do you feel physically and emotionally safe where you currently live?: No     Within the past 12 months, have you been hit, slapped, kicked or otherwise physically hurt by someone?: No     Within the past 12 months, have you been  humiliated or emotionally abused in other ways by your partner or ex-partner?: No   Housing Stability: Low Risk  (8/26/2024)    Housing Stability     Do you have housing? : Yes     Are you worried about losing your housing?: No     FAMILY HISTORY:  Family History   Problem Relation Age of Onset    Diverticulitis Father     Inflammatory Bowel Disease No family hx of     Colon Cancer No family hx of     Coronary Artery Disease Father     Cerebrovascular Disease Maternal Grandfather     Cerebrovascular Disease Paternal Grandfather        PHYSICAL EXAMINATION:  Constitutional: aaox3, cooperative, pleasant, not dyspneic/diaphoretic, no acute distress  Vitals reviewed: There were no vitals taken for this visit.  Wt:   Wt Readings from Last 2 Encounters:   10/23/24 64.2 kg (141 lb 9.6 oz)   08/26/24 60 kg (132 lb 4.4 oz)      Constitutional - general appearance is well and in no acute distress. Body habitus normal  Eyes - No redness or discharge  Respiratory - No cough, unlabored breathing  Musculoskeletal - range of motion intact: Neck and arms  Skin - No discoloration or lesions  Neurological - No tremors, headaches  Psychiatric - No anxiety, alert & oriented    PERTINENT STUDIES:    Most recent hepatic panel:  Recent Labs   Lab Test 08/26/24  0837 08/25/24  2336   ALT 14 15   AST 15 20     Most recent creatinine:  Recent Labs   Lab Test 08/27/24  0542 08/26/24  0837   CR 0.63 0.54

## 2025-05-13 NOTE — PATIENT INSTRUCTIONS
It was a pleasure taking care of you today.  I've included a brief summary of our discussion and care plan from today's visit below.  Please review this information with your primary care provider.  ______________________________________________________________________    My recommendations are summarized as follows:    -- Continue lialda 4 pills daily and rowasa nightly  -- Colonoscopy this summer   -- Patient with IBD we recommend supplementation vitamin D 1000 units daily and calcium 500 mg twice daily.  -- No NSAIDs (ibuprofen, or anything containing ibuprofen)    For additional resources about inflammatory bowel disease visit http://www.crohnscolitisfoundation.org/    To learn more about Diet and Nutrition in the setting of IBD, check out some of these resources:  https://www.crohnscolitisfoundation.org/diet-and-nutrition/what-should-i-eat  https://www.nimbal.org/  https://ntforibd.org/    Return to GI Clinic in 6 months to review your progress.    ______________________________________________________________________    How do I schedule labs, imaging studies, or procedures that were ordered in clinic today?     Labs: To schedule lab appointment at the [facility], use my chart or call [phone]. If you have a [place] lab closer to home where you are regularly seen you can give them a call.     Procedures: If a colonoscopy, upper endoscopy, breath test, esophageal manometry, or pH impedence was ordered today, our endoscopy team will call you to schedule this. If you have not heard from our endoscopy team within a week, please call [phone] to schedule.     Imaging Studies: If you were scheduled for a CT scan, X-ray, MRI, ultrasound, HIDA scan or other imaging study, please call [phone] to have this scheduled.     Referral: If a referral to another specialty was ordered, expect a phone call or follow instructions above. If you have not heard from anyone regarding your referral in a  week, please call our clinic to check the status.     Who do I call with any questions after my visit?  Please be in touch if there are any further questions that arise following today's visit.  There are multiple ways to contact your gastroenterology care team.      During business hours, you may reach a Gastroenterology nurse at [phone]    To schedule or reschedule an appointment, please call [phone].     You can always send a secure message through [email or web address].  [email or web address] messages are answered by your nurse or doctor typically within 24 hours.  Please allow extra time on weekends and holidays.      For urgent/emergent questions after business hours, you may reach the on-call GI Fellow by contacting the [facility]  at [phone].     How will I get the results of any tests ordered?    You will receive all of your results.  If you have signed up for [employer], any tests ordered at your visit will be available to you after your physician reviews them.  Typically this takes 1-2 weeks.  If there are urgent results that require a change in your care plan, your physician or nurse will call you to discuss the next steps.      What is [email or web address]?  [email or web address] is a secure way for you to access all of your healthcare records from the [facility].  It is a web based computer program, so you can sign on to it from any location.  It also allows you to send secure messages to your care team.  I recommend signing up for [email or web address] access if you have not already done so and are comfortable with using a computer.         Sincerely,    Gerhard Nicolas PA-C  [facility]  Division of Gastroenterology

## 2025-05-13 NOTE — NURSING NOTE
Current patient location: [address] [place] [id number]    Is the patient currently in the state of [place]? YES    Visit mode: VIDEO    If the visit is dropped, the patient can be reconnected by:VIDEO VISIT: Text to cell phone:   Telephone Information:   Mobile [phone]       Will anyone else be joining the visit? NO  (If patient encounters technical issues they should call [phone]    Are changes needed to the allergy or medication list? No    Are refills needed on medications prescribed by this physician? NO    Rooming Documentation:  Questionnaire(s) completed    Reason for visit: RECHECK    Viridiana SÁNCHEZF

## 2025-05-13 NOTE — LETTER
5/13/2025      Velma Mcfarlane  [address] [facility] [address]      Dear Colleague,    Thank you for referring your patient, Velma Mcfarlane, to the [facility] [place]. Please see a copy of my visit note below.    Virtual Visit Details    Type of service:  Video Visit     Originating Location (pt. Location): Home    Distant Location (provider location):  Off-site  Platform used for Video Visit: [facility]    IBD CLINIC VISIT -- follow up    CHIEF COMPLAINT: SCAD evaluation and management    SCAD HISTORY  Age at diagnosis: 44  Extent of disease: SCAD, left sided rui-diverticular inflammation  Prior SCAD Medications: Cipro/flagyl (helped)    DISEASE ASSESSMENT  Labs:  Recent Labs   Lab Test 11/05/24  1230 08/25/24  1328 08/03/24  1249 12/14/23  1334 11/29/23  1602 09/18/23  1101 06/19/19  1009 03/18/19  1652 06/20/18  1611   CRP  --   --   --   --   --   --  <2.9  --  0.6   SED  --   --  8  --  10   < > 9  --  20   HGB 12.9   < > 15.7   < > 14.3   < >  --    < >  --     < > = values in this interval not displayed.     Endoscopic assessment:   Colonoscopy 12/20/2023 with congested erythematous eroded friable mucosa in the sigmoid and distal rectum.  Biopsies show moderate active inflammation.    8/2019 icscope showed Dee 2 colitis from 30 cm-40cm. Diverticulosis in the sigmoid. Rest of colon was normal.     PATH:  SPECIMEN(S):   A: Cecal biopsy   B: Colon biopsy, ascending   C: Colon biopsy, transverse   D: Colon biopsy, descending   E: Sigmoid colon biopsy   F: Rectal biopsy     FINAL DIAGNOSIS:   A. Cecum, biopsy:   Colonic mucosa with no pathologic abnormalities     B. Ascending Colon, Biopsy:   Colonic mucosa with no pathologic abnormalities     C. Transverse Colon, biopsy:   Colonic mucosa with no pathologic abnormalities     D. Descending Colon, biopsy:   Colonic mucosa with no pathologic abnormalities     E. Sigmoid Colon, biopsy:   Colitis  with crypt injury, moderately active, with rectum sparing (see   part F below); consistent with SCAD   (segmental colitisassociated diverticulosis); negative for dysplasia     F. Rectum, biopsy:   Rectal mucosa with no pathologic abnormalities     EGD 4/15/19 Normal, normal biopsies of esophagus, stomach and duodenum  3/25/19 colonoscopy for rectal bleeding: left sided diverticular with edema, erythema, loss of vascular pattern noted in 2 segments (40-36 cm and 30-22 cm from the anus.    CT ab/pelvis 3/5/19:  Renal hypodensities stable from 4/16/19 exam, 2 mm calculus in right kidney, no gastric or small bowel abnormality  Fecal calprotectin: pending  C diff: negative per OSH records    ASSESSMENT/PLAN  Mrs. Salter is a 50 year old woman with history of cholecystectomy, 3 uncomplicated vaginal deliveries, thyroid cysts (per patient), and segmental colitis associated with diverticular disease who is following up for management of her disease.  She is on SCD diet.     Flare Dec 2023 with urgent bloody stools.  Fecal calprotectin greater than 3000.  Flex sig showed moderate active inflammation in the rectosigmoid.  She responded well to a short course of prednisone and restarting her Lialda in addition to continuation of Rowasa enemas.    Current medication: Lialda 4.8 g a day and Rowasa enemas nightly  Last endoscopic disease activity: Colonoscopy 12/20/2023 with congested erythematous eroded friable mucosa in the sigmoid and distal rectum.  Biopsies show moderate active inflammation.    We will proceed with the following plan:     Plan:  --- Continue Lialda 4.8 g/day   --- Continue rowasa enemas nightly  --- Colonoscopy this summer to assess mucosal healing and if need to change IBD therapies  --- Labs CBC, LFTs, CRP, ESR + creatinine this summer    Return to clinic in 6 months    Thank you for this consultation.  It was a pleasure to participate in the care of this patient; please contact us with any further  questions.      Gerhard Nicolas PA-C  Division of Gastroenterology, Hepatology and Nutrition  [facility]      HPI:   Mrs. Salter is a 50 year old woman with history of cholecystectomy, 3 uncomplicated vaginal deliveries, thyroid cysts (per patient), and recent diagnosis of segmental colitis associated with diverticular disease who is establishing care for management of her disease.    2014 seen at [facility] for diverticulitis and and SCAD.    Colonoscopy 3/2019 with finding of edema, erythema, loss of and loss of vascular patter in 2 segments of left colon with mild to moderate diverticulosis.  Sigmoid biopsies showed mildly active chronic colitis consistent with SCAD.    Patient was treated with 10 day course of cipro/flagyl with improvement in symptoms followed by Lialda 4.8 G daily with possible improvement. On 19 the patient was found to be c.diff negative.      Had a flare end of  prompting a flexible sigmoidoscopy showing moderate active inflammation in the rectosigmoid requiring prednisone.  She was able to successfully taper off prednisone and continue with Lialda and Rowasa enemas.     Most recently, she had an episode of hives beginning 2025, and did a 10 day course of steroids. Then following this she had an asthma exacerbation requiring another steroid burst end of April, just completing steroids 2 days ago.  Currently having 1-2 stools per day. No blood in the stool. No urgency. No new EIM.    Prior report of dysphagia and nausea,  Had upper endoscopy 2019, 1 gastric polyp (fundic gland).  Separate mid/distal esophageal, gastric and duodenal biopsies negative.    Patient notes Hx of thyroid cysts and borderline TSH levels.       Social hx   -no smoking  -1-2 x month nsaids  -rare alcohol   -no illicit  -Bike 12 miles a day, weights    Fam Hx  Mother with history colitis - no issues in years, colon polyps  Maternal grandfather  of colon cancer at age 76  Both parents  have diverticulitis    ROS:    ROS: 10 point ROS neg other than the symptoms noted above in the HPI.    Extra intestinal manifestations of IBD:  No uveitis/episcleritis  No aphthous ulcers   No arthritis   No erythema nodosum/pyoderma gangrenosum.     PERTINENT PAST MEDICAL HISTORY:  None     PREVIOUS SURGERIES:  Past Surgical History:   Procedure Laterality Date     CHOLECYSTECTOMY       COLONOSCOPY       COLONOSCOPY N/A 8/12/2019    Procedure: COLONOSCOPY, WITH POLYPECTOMY AND BIOPSY;  Surgeon: Yolanda Molina MD;  Location: [facility] OR     HC REMOVAL GALLBLADDER      Description: Cholecystectomy;  Recorded: 04/06/2011;     Surgical hx  -Cholecystectomy 2010  -3 children - vaginal x 3, Bladder sling placed 2016    PREVIOUS ENDOSCOPY:  See above    ALLERGIES:     Allergies   Allergen Reactions     Sulfa Antibiotics Hives     Other reaction(s): *Unknown     Tetracycline Hives       PERTINENT MEDICATIONS:    Current Outpatient Medications:      albuterol (PROAIR HFA/PROVENTIL HFA/VENTOLIN HFA) 108 (90 Base) MCG/ACT inhaler, Inhale 2 puffs into the lungs every 4 hours as needed for shortness of breath, wheezing or cough, Disp: 18 g, Rfl: 0     amitriptyline (ELAVIL) 25 MG tablet, Take 25 mg by mouth at bedtime., Disp: , Rfl:      butalbital-acetaminophen-caffeine (FIORICET/ESGIC) -40 MG tablet, Take 1 tablet by mouth every 6 hours as needed for migraine., Disp: , Rfl:      cholecalciferol (VITAMIN D3) 125 mcg (5000 units) capsule, Take by mouth daily., Disp: , Rfl:      fluticasone-salmeterol (ADVAIR) 250-50 MCG/ACT inhaler, Inhale 1 puff into the lungs every 12 hours., Disp: , Rfl:      mesalamine (LIALDA) 1.2 g DR tablet, Take 4 tablets (4,800 mg) by mouth daily., Disp: 360 tablet, Rfl: 3     mesalamine (ROWASA) 4 g enema, Place 1 enema (4 g) rectally at bedtime., Disp: 1800 mL, Rfl: 11    SOCIAL HISTORY:  Social History     Socioeconomic History     Marital status:      Spouse name: Not on file     Number  of children: Not on file     Years of education: Not on file     Highest education level: Not on file   Occupational History     Not on file   Tobacco Use     Smoking status: Never     Passive exposure: Past (Dad was a smoker)     Smokeless tobacco: Never   Vaping Use     Vaping status: Never Used   Substance and Sexual Activity     Alcohol use: Yes     Comment: occasional     Drug use: Yes     Types: Marijuana     Comment: uses wax and vape     Sexual activity: Not on file   Other Topics Concern     Parent/sibling w/ CABG, MI or angioplasty before 65F 55M? Not Asked   Social History Narrative     Not on file     Social Drivers of Health     Financial Resource Strain: Low Risk  (8/26/2024)    Financial Resource Strain      Within the past 12 months, have you or your family members you live with been unable to get utilities (heat, electricity) when it was really needed?: No   Food Insecurity: Low Risk  (8/26/2024)    Food Insecurity      Within the past 12 months, did you worry that your food would run out before you got money to buy more?: No      Within the past 12 months, did the food you bought just not last and you didn t have money to get more?: No   Transportation Needs: Low Risk  (8/26/2024)    Transportation Needs      Within the past 12 months, has lack of transportation kept you from medical appointments, getting your medicines, non-medical meetings or appointments, work, or from getting things that you need?: No   Physical Activity: Not on file   Stress: Not on file   Social Connections: Not on file   Interpersonal Safety: Low Risk  (8/26/2024)    Interpersonal Safety      Do you feel physically and emotionally safe where you currently live?: Yes      Within the past 12 months, have you been hit, slapped, kicked or otherwise physically hurt by someone?: No      Within the past 12 months, have you been humiliated or emotionally abused in other ways by your partner or ex-partner?: No   Recent Concern:  Interpersonal Safety - High Risk (8/26/2024)    Interpersonal Safety      Do you feel physically and emotionally safe where you currently live?: No      Within the past 12 months, have you been hit, slapped, kicked or otherwise physically hurt by someone?: No      Within the past 12 months, have you been humiliated or emotionally abused in other ways by your partner or ex-partner?: No   Housing Stability: Low Risk  (8/26/2024)    Housing Stability      Do you have housing? : Yes      Are you worried about losing your housing?: No     FAMILY HISTORY:  Family History   Problem Relation Age of Onset     Diverticulitis Father      Inflammatory Bowel Disease No family hx of      Colon Cancer No family hx of      Coronary Artery Disease Father      Cerebrovascular Disease Maternal Grandfather      Cerebrovascular Disease Paternal Grandfather        PHYSICAL EXAMINATION:  Constitutional: aaox3, cooperative, pleasant, not dyspneic/diaphoretic, no acute distress  Vitals reviewed: There were no vitals taken for this visit.  Wt:   Wt Readings from Last 2 Encounters:   10/23/24 64.2 kg (141 lb 9.6 oz)   08/26/24 60 kg (132 lb 4.4 oz)      Constitutional - general appearance is well and in no acute distress. Body habitus normal  Eyes - No redness or discharge  Respiratory - No cough, unlabored breathing  Musculoskeletal - range of motion intact: Neck and arms  Skin - No discoloration or lesions  Neurological - No tremors, headaches  Psychiatric - No anxiety, alert & oriented    PERTINENT STUDIES:    Most recent hepatic panel:  Recent Labs   Lab Test 08/26/24  0837 08/25/24  2336   ALT 14 15   AST 15 20     Most recent creatinine:  Recent Labs   Lab Test 08/27/24  0542 08/26/24  0837   CR 0.63 0.54                             Again, thank you for allowing me to participate in the care of your patient.        Sincerely,        Gerhard Nicolas PA-C    Electronically signed

## 2025-06-05 ENCOUNTER — TELEPHONE (OUTPATIENT)
Dept: GASTROENTEROLOGY | Facility: CLINIC | Age: 50
End: 2025-06-05
Payer: COMMERCIAL

## 2025-06-05 NOTE — TELEPHONE ENCOUNTER
"Endoscopy Scheduling Screen    Caller: patient    Have you had any respiratory illness or flu-like symptoms in the last 10 days?  No    What is your communication preference for Instructions and/or Bowel Prep?   MyChart    What insurance is in the chart?  Other:      Ordering/Referring Provider: Maria Isabel   (If ordering provider performs procedure, schedule with ordering provider unless otherwise instructed. )    BMI: Estimated body mass index is 22.85 kg/m  as calculated from the following:    Height as of 10/23/24: 1.676 m (5' 6\").    Weight as of 10/23/24: 64.2 kg (141 lb 9.6 oz).     Sedation Ordered  moderate sedation.   If patient BMI > 50 do not schedule in [facility].    If patient BMI > 45 do not schedule at [facility].    Are you taking methadone or Suboxone?  NO, No RN review required.    Have you been diagnosed and are being treated for severe PTSD or severe anxiety?  NO, No RN review required.    Are you taking any prescription medications for pain 3 or more times per week?   NO, No RN review required.    Do you have a history of malignant hyperthermia?  No    (Females) Are you currently pregnant?        Have you been diagnosed or told you have pulmonary hypertension?   No    Do you have an LVAD?  No    Have you been told you have moderate to severe sleep apnea?  No.    Have you been told you have COPD, asthma, or any other lung disease?  Yes     What breathing problems do you have?  Asthma     Do you use home oxygen?  No    Have your breathing problems required an ED visit or hospitalization in the last year?  Yes. (RN Review required for scheduling unless scheduling in Hospital.) August 2024    Has your doctor ordered any cardiac tests like echo, angiogram, stress test, ablation, or EKG, that you have not completed yet?  No    Do you  have a history of any heart conditions?  No     Have you ever had or are you waiting for an organ transplant?  No. Continue scheduling, no site restrictions.    Have you had a stroke " "or transient ischemic attack (TIA aka \"mini stroke\") in the last 2 years?   No.    Have you been diagnosed with or been told you have cirrhosis of the liver?   No.    Are you currently on dialysis?   No    Do you need assistance transferring?   No    BMI: Estimated body mass index is 22.85 kg/m  as calculated from the following:    Height as of 10/23/24: 1.676 m (5' 6\").    Weight as of 10/23/24: 64.2 kg (141 lb 9.6 oz).     Is patients BMI > 40 and scheduling location [facility]?  No    Do you take an injectable or oral medication for weight loss or diabetes (excluding insulin)?  No    Do you take the medication Naltrexone?  No    Do you take blood thinners?  No       Prep   Are you currently on dialysis or do you have chronic kidney disease?  No    Do you have a diagnosis of diabetes?  No    Do you have a diagnosis of cystic fibrosis (CF)?  No    On a regular basis do you go 3 -5 days between bowel movements?  No    BMI > 40?  No    Preferred Pharmacy:    [facility]/pharmacy #[id number] - [address] [address]  Phone: [phone] Fax: [phone]    Final Scheduling Details     Procedure scheduled  Colonoscopy    Surgeon:  Orville     Date of procedure:  7/28/25     Pre-OP / PAC:   No - Not required for this site.    Location  [facility] - Per exclusion criteria.    Sedation   Moderate Sedation - Per order.      Patient Reminders:   You will receive a call from a Nurse to review instructions and health history.  This assessment must be completed prior to your procedure.  Failure to complete the Nurse assessment may result in the procedure being cancelled.      On the day of your procedure, please designate an adult(s) who can drive you home stay with you for the next 24 hours. The medicines used in the exam will make you sleepy. You will not be able to drive.      You cannot take public transportation, ride share services, or non-medical taxi service without a responsible " caregiver.  Medical transport services are allowed with the requirement that a responsible caregiver will receive you at your destination.  We require that drivers and caregivers are confirmed prior to your procedure.

## 2025-07-07 ENCOUNTER — TELEPHONE (OUTPATIENT)
Dept: GASTROENTEROLOGY | Facility: CLINIC | Age: 50
End: 2025-07-07
Payer: COMMERCIAL

## 2025-07-07 NOTE — TELEPHONE ENCOUNTER
Patient was admitted x1 8/26/25 and discharged 8/27/25 for severe asthma exacerbation. Since then, patient has had subsequent visit for asthma, noted as moderate asthma. Most recent visit, per chart review, 5/4/25 for worsening SOB, wheezing, productive cough with yellow sputum. PFTs during office visit were reassuring. Please verify if patient is having any new or worsening SOB, wheezing, cough. If so, please send to anesthesia for review to check if patient needs MAC sedation and PAC eval.       Pre visit planning completed.      Procedure details:    Patient scheduled for Colonoscopy on 7/28/25.     Arrival time: 0845. Procedure time 0945    Facility location: [facility]; [address], [address], [place] [address]. Check in location: Main entrance at registration desk.  *Disclaimer: Drivers are to check in with patient and stay on campus during procedure.     Sedation type: Conscious sedation     Pre op exam needed? No.    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 No    Recent diagnosis of diverticulitis within the last 6 weeks? No      Medication review:    Diabetic? No    Anticoagulants? No    Weight loss medication/injectable? No GLP-1 medication per patient's medication list. Nursing to verify with pre-assessment call.    Other medication HOLDING recommendations:  Cannabis/Marijuana: Stop night before procedure.      Prep for procedure:     Bowel prep recommendation: Standard Miralax.   Due to: standard bowel prep    Procedure information and instructions sent via [employer]         Mellisa Lerma RN  Endoscopy Procedure Pre Assessment   [phone] option 3

## 2025-07-08 NOTE — TELEPHONE ENCOUNTER
Pre assessment completed for upcoming procedure.   (Please see previous telephone encounter notes for complete details)    Procedure details:    Procedure date 7/28/25, arrival time 0845 and facility location reviewed.    Pre op exam needed? No.    Designated  policy reviewed and that site requests drivers to check in and stay on campus. Instructed to have someone stay 6  hours post procedure.       Medication review:    Medications reviewed. Please see supporting documentation below. Holding recommendations discussed (if applicable).   Cannabis/Marijuana: Stop night before procedure. (Patient states patient does not use)  Patient denies GLP-1, iron, fiber      Prep for procedure:     Procedure prep instructions reviewed.    Standard Miralax  Reminded NPO 2 hours prior to arrival time. NPO at 0645    Any additional information needed:  Writer called and discussed with patient asthma sx. Patient denies any new or worsening asthma symptoms such as cough, difficulty breathing or wheezing and has reported that symptoms have subsided. Patient advised to call back if patient develops any new or worsening asthma sx such as SOB, wheezing, cough.      Patient verbalized understanding and had no questions or concerns at this time.      Mellisa Lerma RN  Endoscopy Procedure Pre Assessment   [phone] option 3

## 2025-07-28 ENCOUNTER — HOSPITAL ENCOUNTER (OUTPATIENT)
Facility: CLINIC | Age: 50
Discharge: HOME | End: 2025-07-28
Attending: INTERNAL MEDICINE | Admitting: INTERNAL MEDICINE
Payer: COMMERCIAL

## 2025-07-28 VITALS
DIASTOLIC BLOOD PRESSURE: 75 MMHG | HEART RATE: 105 BPM | OXYGEN SATURATION: 100 % | SYSTOLIC BLOOD PRESSURE: 105 MMHG | RESPIRATION RATE: 17 BRPM

## 2025-07-28 DIAGNOSIS — K51.319 ULCERATIVE RECTOSIGMOIDITIS WITH COMPLICATION (H): Primary | Chronic | ICD-10-CM

## 2025-07-28 LAB — COLONOSCOPY: NORMAL

## 2025-07-28 PROCEDURE — 250N000011 HC RX IP 250 OP 636: Performed by: INTERNAL MEDICINE

## 2025-07-28 PROCEDURE — 88305 TISSUE EXAM BY PATHOLOGIST: CPT | Mod: TC | Performed by: INTERNAL MEDICINE

## 2025-07-28 PROCEDURE — 99153 MOD SED SAME PHYS/QHP EA: CPT | Performed by: INTERNAL MEDICINE

## 2025-07-28 PROCEDURE — G0500 MOD SEDAT ENDO SERVICE >5YRS: HCPCS | Performed by: INTERNAL MEDICINE

## 2025-07-28 PROCEDURE — 45380 COLONOSCOPY AND BIOPSY: CPT | Performed by: INTERNAL MEDICINE

## 2025-07-28 RX ORDER — NALOXONE HYDROCHLORIDE 0.4 MG/ML
0.2 INJECTION, SOLUTION INTRAMUSCULAR; INTRAVENOUS; SUBCUTANEOUS
Status: DISCONTINUED | OUTPATIENT
Start: 2025-07-28 | End: 2025-07-28 | Stop reason: HOSPADM

## 2025-07-28 RX ORDER — DIPHENHYDRAMINE HYDROCHLORIDE 50 MG/ML
INJECTION, SOLUTION INTRAMUSCULAR; INTRAVENOUS PRN
Status: DISCONTINUED | OUTPATIENT
Start: 2025-07-28 | End: 2025-07-28 | Stop reason: HOSPADM

## 2025-07-28 RX ORDER — NALOXONE HYDROCHLORIDE 0.4 MG/ML
0.4 INJECTION, SOLUTION INTRAMUSCULAR; INTRAVENOUS; SUBCUTANEOUS
Status: DISCONTINUED | OUTPATIENT
Start: 2025-07-28 | End: 2025-07-28 | Stop reason: HOSPADM

## 2025-07-28 RX ORDER — ONDANSETRON 2 MG/ML
4 INJECTION INTRAMUSCULAR; INTRAVENOUS
Status: DISCONTINUED | OUTPATIENT
Start: 2025-07-28 | End: 2025-07-28 | Stop reason: HOSPADM

## 2025-07-28 RX ORDER — ONDANSETRON 2 MG/ML
4 INJECTION INTRAMUSCULAR; INTRAVENOUS EVERY 6 HOURS PRN
Status: DISCONTINUED | OUTPATIENT
Start: 2025-07-28 | End: 2025-07-28 | Stop reason: HOSPADM

## 2025-07-28 RX ORDER — FLUMAZENIL 0.1 MG/ML
0.2 INJECTION, SOLUTION INTRAVENOUS
Status: DISCONTINUED | OUTPATIENT
Start: 2025-07-28 | End: 2025-07-28 | Stop reason: HOSPADM

## 2025-07-28 RX ORDER — FENTANYL CITRATE 50 UG/ML
INJECTION, SOLUTION INTRAMUSCULAR; INTRAVENOUS PRN
Status: DISCONTINUED | OUTPATIENT
Start: 2025-07-28 | End: 2025-07-28 | Stop reason: HOSPADM

## 2025-07-28 RX ORDER — ONDANSETRON 4 MG/1
4 TABLET, ORALLY DISINTEGRATING ORAL EVERY 6 HOURS PRN
Status: DISCONTINUED | OUTPATIENT
Start: 2025-07-28 | End: 2025-07-28 | Stop reason: HOSPADM

## 2025-07-28 RX ORDER — LIDOCAINE 40 MG/G
CREAM TOPICAL
Status: DISCONTINUED | OUTPATIENT
Start: 2025-07-28 | End: 2025-07-28 | Stop reason: HOSPADM

## 2025-07-28 RX ORDER — PROCHLORPERAZINE MALEATE 5 MG/1
10 TABLET ORAL EVERY 6 HOURS PRN
Status: DISCONTINUED | OUTPATIENT
Start: 2025-07-28 | End: 2025-07-28 | Stop reason: HOSPADM

## 2025-07-28 ASSESSMENT — ACTIVITIES OF DAILY LIVING (ADL)
ADLS_ACUITY_SCORE: 50

## 2025-07-28 NOTE — H&P
ENDOSCOPY PRE-SEDATION H&P FOR OUTPATIENT PROCEDURES    Velma Mcfarlane  [id number]  1975    Procedure: Colonoscopy     Pre-procedure diagnosis: Colitis    Past medical history:   Past Medical History:   Diagnosis Date    Asthma exacerbation     Non-specific colitis      Patient Active Problem List   Diagnosis    Non-toxic multinodular goiter    Renal infarction    Splenic artery aneurysm    Uterine leiomyoma    Moderate persistent asthma with exacerbation    History of colitis    Acute respiratory failure with hypoxia (H)    Non-specific colitis    Ulcerative rectosigmoiditis (H)    Severe asthma with exacerbation, unspecified whether persistent       Past surgical history:   Past Surgical History:   Procedure Laterality Date    CHOLECYSTECTOMY      COLONOSCOPY      COLONOSCOPY N/A 8/12/2019    Procedure: COLONOSCOPY, WITH POLYPECTOMY AND BIOPSY;  Surgeon: Yolanda Molina MD;  Location: [facility] OR    HC REMOVAL GALLBLADDER      Description: Cholecystectomy;  Recorded: 04/06/2011;       No current facility-administered medications for this encounter.       Allergies   Allergen Reactions    Sulfa Antibiotics Hives     Other reaction(s): *Unknown    Tetracycline Hives       History of Anesthesia/Sedation Problems: no    Physical Exam:    Mental status: alert  Heart: Normal  Lung: Normal  Assessment of patient's airway: Normal  Other as pertinent for procedure: None     Lab Results   Component Value Date    WBC 7.2 11/05/2024     Lab Results   Component Value Date    RBC 4.11 11/05/2024     Lab Results   Component Value Date    HGB 12.9 11/05/2024     Lab Results   Component Value Date    HCT 39.1 11/05/2024     Lab Results   Component Value Date    MCV 95 11/05/2024     Lab Results   Component Value Date    MCH 31.4 11/05/2024     Lab Results   Component Value Date    MCHC 33.0 11/05/2024     Lab Results   Component Value Date    RDW 13.0 11/05/2024     Lab Results   Component Value Date      11/05/2024     INR   Date Value Ref Range Status   03/18/2019 1.12 (H) 0.90 - 1.10 Final        ASA Score: See Provation note    Mallampati score:  II - Faucial pillars and soft palate may be seen, but uvula is masked by the base of the tongue    Assessment/Plan:     The patient is an appropriate candidate to receive sedation.    Informed consent was discussed with the patient/family, including the risks, benefits, potential complications and any alternative options associated with sedation.    Patient assessment completed just prior to sedation and while under constant observation by the provider. Condition determined to be adequate for proceeding with sedation.    The specific risks for the procedure were discussed with the patient at the time of informed consent and include but are not limited to perforation which could require surgery, missing significant neoplasm or lesion, hemorrhage and adverse sedative complication.      Kevin Jacinto MD

## 2025-07-28 NOTE — OR NURSING
Colon with biopsies performed under moderate sedation, patient tolerated well. Transferred to  and report given to recovery RN.

## 2025-08-05 ENCOUNTER — LAB (OUTPATIENT)
Dept: LAB | Facility: CLINIC | Age: 50
End: 2025-08-05
Payer: COMMERCIAL

## 2025-08-05 ENCOUNTER — OFFICE VISIT (OUTPATIENT)
Dept: GASTROENTEROLOGY | Facility: CLINIC | Age: 50
End: 2025-08-05
Payer: COMMERCIAL

## 2025-08-05 VITALS
BODY MASS INDEX: 21.19 KG/M2 | WEIGHT: 135 LBS | SYSTOLIC BLOOD PRESSURE: 100 MMHG | OXYGEN SATURATION: 99 % | HEART RATE: 89 BPM | HEIGHT: 67 IN | DIASTOLIC BLOOD PRESSURE: 70 MMHG

## 2025-08-05 DIAGNOSIS — K51.30 ULCERATIVE RECTOSIGMOIDITIS WITHOUT COMPLICATION (H): Primary | ICD-10-CM

## 2025-08-05 DIAGNOSIS — K51.30 ULCERATIVE RECTOSIGMOIDITIS WITHOUT COMPLICATION (H): ICD-10-CM

## 2025-08-05 DIAGNOSIS — K57.30 SEGMENTAL COLITIS ASSOCIATED WITH DIVERTICULOSIS (H): ICD-10-CM

## 2025-08-05 DIAGNOSIS — K50.10 SEGMENTAL COLITIS ASSOCIATED WITH DIVERTICULOSIS (H): ICD-10-CM

## 2025-08-05 LAB
BASOPHILS # BLD AUTO: 0 10E3/UL (ref 0–0.2)
BASOPHILS NFR BLD AUTO: 1 %
EOSINOPHIL # BLD AUTO: 0.6 10E3/UL (ref 0–0.7)
EOSINOPHIL NFR BLD AUTO: 8 %
ERYTHROCYTE [DISTWIDTH] IN BLOOD BY AUTOMATED COUNT: 12.3 % (ref 10–15)
HCT VFR BLD AUTO: 39.1 % (ref 35–53)
HGB BLD-MCNC: 13.3 G/DL (ref 11.7–17.7)
IMM GRANULOCYTES # BLD: 0 10E3/UL
IMM GRANULOCYTES NFR BLD: 0 %
LYMPHOCYTES # BLD AUTO: 2.5 10E3/UL (ref 0.8–5.3)
LYMPHOCYTES NFR BLD AUTO: 36 %
MCH RBC QN AUTO: 30.7 PG (ref 26.5–33)
MCHC RBC AUTO-ENTMCNC: 34 G/DL (ref 31.5–36.5)
MCV RBC AUTO: 90 FL (ref 78–100)
MONOCYTES # BLD AUTO: 0.6 10E3/UL (ref 0–1.3)
MONOCYTES NFR BLD AUTO: 9 %
NEUTROPHILS # BLD AUTO: 3.2 10E3/UL (ref 1.6–8.3)
NEUTROPHILS NFR BLD AUTO: 46 %
PLATELET # BLD AUTO: 319 10E3/UL (ref 150–450)
RBC # BLD AUTO: 4.33 10E6/UL (ref 3.8–5.9)
WBC # BLD AUTO: 6.9 10E3/UL (ref 4–11)

## 2025-08-05 PROCEDURE — 87340 HEPATITIS B SURFACE AG IA: CPT

## 2025-08-05 PROCEDURE — 82728 ASSAY OF FERRITIN: CPT

## 2025-08-05 PROCEDURE — 86704 HEP B CORE ANTIBODY TOTAL: CPT

## 2025-08-05 PROCEDURE — 1126F AMNT PAIN NOTED NONE PRSNT: CPT | Performed by: PHYSICIAN ASSISTANT

## 2025-08-05 PROCEDURE — 86481 TB AG RESPONSE T-CELL SUSP: CPT

## 2025-08-05 PROCEDURE — 36415 COLL VENOUS BLD VENIPUNCTURE: CPT

## 2025-08-05 PROCEDURE — 82565 ASSAY OF CREATININE: CPT

## 2025-08-05 PROCEDURE — 86803 HEPATITIS C AB TEST: CPT

## 2025-08-05 PROCEDURE — 85025 COMPLETE CBC W/AUTO DIFF WBC: CPT

## 2025-08-05 PROCEDURE — 80076 HEPATIC FUNCTION PANEL: CPT

## 2025-08-05 PROCEDURE — 99215 OFFICE O/P EST HI 40 MIN: CPT | Performed by: PHYSICIAN ASSISTANT

## 2025-08-05 PROCEDURE — 86706 HEP B SURFACE ANTIBODY: CPT

## 2025-08-05 PROCEDURE — 3074F SYST BP LT 130 MM HG: CPT | Performed by: PHYSICIAN ASSISTANT

## 2025-08-05 PROCEDURE — 86140 C-REACTIVE PROTEIN: CPT

## 2025-08-05 PROCEDURE — 83540 ASSAY OF IRON: CPT

## 2025-08-05 PROCEDURE — 3078F DIAST BP <80 MM HG: CPT | Performed by: PHYSICIAN ASSISTANT

## 2025-08-05 PROCEDURE — 83550 IRON BINDING TEST: CPT

## 2025-08-05 ASSESSMENT — PAIN SCALES - GENERAL: PAINLEVEL_OUTOF10: NO PAIN (0)

## 2025-08-06 LAB
ALBUMIN SERPL BCG-MCNC: 4.7 G/DL (ref 3.5–5.2)
ALP SERPL-CCNC: 58 U/L (ref 40–150)
ALT SERPL W P-5'-P-CCNC: 16 U/L (ref 0–70)
AST SERPL W P-5'-P-CCNC: 18 U/L (ref 0–45)
BILIRUB SERPL-MCNC: 0.2 MG/DL
BILIRUBIN DIRECT (ROCHE PRO & PURE): 0.09 MG/DL (ref 0–0.45)
CREAT SERPL-MCNC: 0.66 MG/DL (ref 0.51–1.17)
CRP SERPL-MCNC: <3 MG/L
EGFRCR SERPLBLD CKD-EPI 2021: >90 ML/MIN/1.73M2
FERRITIN SERPL-MCNC: 41 NG/ML (ref 6–409)
HBV CORE AB SERPL QL IA: NONREACTIVE
HBV SURFACE AB SERPL IA-ACNC: 311 M[IU]/ML
HBV SURFACE AB SERPL IA-ACNC: REACTIVE M[IU]/ML
HBV SURFACE AG SERPL QL IA: NONREACTIVE
HCV AB SERPL QL IA: NONREACTIVE
IRON BINDING CAPACITY (ROCHE): 326 UG/DL (ref 240–430)
IRON SATN MFR SERPL: 14 % (ref 15–46)
IRON SERPL-MCNC: 45 UG/DL (ref 37–157)
PROT SERPL-MCNC: 7.5 G/DL (ref 6.4–8.3)

## 2025-08-07 LAB
GAMMA INTERFERON BACKGROUND BLD IA-ACNC: 0.05 IU/ML
M TB IFN-G BLD-IMP: NEGATIVE
M TB IFN-G CD4+ BCKGRND COR BLD-ACNC: 9.95 IU/ML
MITOGEN IGNF BCKGRD COR BLD-ACNC: 0.01 IU/ML
MITOGEN IGNF BCKGRD COR BLD-ACNC: 0.01 IU/ML
QUANTIFERON MITOGEN: 10 IU/ML
QUANTIFERON NIL TUBE: 0.05 IU/ML
QUANTIFERON TB1 TUBE: 0.06 IU/ML
QUANTIFERON TB2 TUBE: 0.06

## 2025-08-18 ENCOUNTER — DOCUMENTATION ONLY (OUTPATIENT)
Dept: PHARMACY | Facility: CLINIC | Age: 50
End: 2025-08-18
Payer: COMMERCIAL

## (undated) DEVICE — KIT ENDO TURNOVER/PROCEDURE CARRY-ON 101822

## (undated) DEVICE — ENDO FORCEP ENDOJAW BIOPSY 2.8MMX230CM FB-220U

## (undated) DEVICE — SUCTION MANIFOLD NEPTUNE 2 SYS 1 PORT 702-025-000

## (undated) DEVICE — KIT ENDO FIRST STEP DISINFECTANT 200ML W/POUCH EP-4

## (undated) DEVICE — SPECIMEN CONTAINER 3OZ W/FORMALIN 59901

## (undated) DEVICE — GOWN IMPERVIOUS 2XL BLUE

## (undated) DEVICE — WIPE PREMOIST CLEANSING WASHCLOTHS 7988

## (undated) DEVICE — ENDO FORCEP BX CAPTURA PRO SPIKE G50696

## (undated) DEVICE — TUBING SUCTION 12"X1/4" N612

## (undated) DEVICE — DRSG GAUZE 4X4" TRAY 6939

## (undated) DEVICE — SOL WATER IRRIG 500ML BOTTLE 2F7113

## (undated) DEVICE — TUBING SUCTION MEDI-VAC 1/4"X20' N620A

## (undated) DEVICE — PAD CHUX UNDERPAD 30X36" P3036C

## (undated) DEVICE — SOL WATER IRRIG 1000ML BOTTLE 2F7114

## (undated) RX ORDER — FENTANYL CITRATE 50 UG/ML
INJECTION, SOLUTION INTRAMUSCULAR; INTRAVENOUS
Status: DISPENSED
Start: 2023-12-20

## (undated) RX ORDER — FENTANYL CITRATE 50 UG/ML
INJECTION, SOLUTION INTRAMUSCULAR; INTRAVENOUS
Status: DISPENSED
Start: 2019-08-12

## (undated) RX ORDER — DIPHENHYDRAMINE HYDROCHLORIDE 50 MG/ML
INJECTION, SOLUTION INTRAMUSCULAR; INTRAVENOUS
Status: DISPENSED
Start: 2025-07-28

## (undated) RX ORDER — FENTANYL CITRATE 50 UG/ML
INJECTION, SOLUTION INTRAMUSCULAR; INTRAVENOUS
Status: DISPENSED
Start: 2025-07-28